# Patient Record
Sex: MALE | Race: WHITE | NOT HISPANIC OR LATINO | Employment: OTHER | URBAN - METROPOLITAN AREA
[De-identification: names, ages, dates, MRNs, and addresses within clinical notes are randomized per-mention and may not be internally consistent; named-entity substitution may affect disease eponyms.]

---

## 2017-01-02 ENCOUNTER — HOSPITAL ENCOUNTER (OUTPATIENT)
Dept: RADIOLOGY | Facility: MEDICAL CENTER | Age: 62
End: 2017-01-02

## 2017-01-02 ENCOUNTER — RESOLUTE PROFESSIONAL BILLING HOSPITAL PROF FEE (OUTPATIENT)
Dept: CARDIOLOGY | Facility: MEDICAL CENTER | Age: 62
End: 2017-01-02
Payer: COMMERCIAL

## 2017-01-02 ENCOUNTER — HOSPITAL ENCOUNTER (INPATIENT)
Facility: MEDICAL CENTER | Age: 62
LOS: 2 days | DRG: 247 | End: 2017-01-04
Attending: EMERGENCY MEDICINE | Admitting: HOSPITALIST
Payer: COMMERCIAL

## 2017-01-02 PROBLEM — I21.4 NSTEMI (NON-ST ELEVATED MYOCARDIAL INFARCTION) (HCC): Status: ACTIVE | Noted: 2017-01-02

## 2017-01-02 LAB
ALBUMIN SERPL BCP-MCNC: 3.6 G/DL (ref 3.2–4.9)
ALBUMIN/GLOB SERPL: 1.3 G/DL
ALP SERPL-CCNC: 63 U/L (ref 30–99)
ALT SERPL-CCNC: 14 U/L (ref 2–50)
ANION GAP SERPL CALC-SCNC: 10 MMOL/L (ref 0–11.9)
AST SERPL-CCNC: 23 U/L (ref 12–45)
BASOPHILS # BLD AUTO: 0.3 % (ref 0–1.8)
BASOPHILS # BLD: 0.02 K/UL (ref 0–0.12)
BILIRUB SERPL-MCNC: 0.3 MG/DL (ref 0.1–1.5)
BUN SERPL-MCNC: 20 MG/DL (ref 8–22)
CALCIUM SERPL-MCNC: 9.1 MG/DL (ref 8.5–10.5)
CHLORIDE SERPL-SCNC: 108 MMOL/L (ref 96–112)
CO2 SERPL-SCNC: 18 MMOL/L (ref 20–33)
CREAT SERPL-MCNC: 0.79 MG/DL (ref 0.5–1.4)
EKG IMPRESSION: NORMAL
EKG IMPRESSION: NORMAL
EOSINOPHIL # BLD AUTO: 0.02 K/UL (ref 0–0.51)
EOSINOPHIL NFR BLD: 0.3 % (ref 0–6.9)
ERYTHROCYTE [DISTWIDTH] IN BLOOD BY AUTOMATED COUNT: 41.5 FL (ref 35.9–50)
GFR SERPL CREATININE-BSD FRML MDRD: >60 ML/MIN/1.73 M 2
GLOBULIN SER CALC-MCNC: 2.7 G/DL (ref 1.9–3.5)
GLUCOSE SERPL-MCNC: 123 MG/DL (ref 65–99)
HCT VFR BLD AUTO: 43.1 % (ref 42–52)
HGB BLD-MCNC: 14.6 G/DL (ref 14–18)
IMM GRANULOCYTES # BLD AUTO: 0.02 K/UL (ref 0–0.11)
IMM GRANULOCYTES NFR BLD AUTO: 0.3 % (ref 0–0.9)
INR PPP: 0.98 (ref 0.87–1.13)
LYMPHOCYTES # BLD AUTO: 1.24 K/UL (ref 1–4.8)
LYMPHOCYTES NFR BLD: 17.1 % (ref 22–41)
MCH RBC QN AUTO: 30.7 PG (ref 27–33)
MCHC RBC AUTO-ENTMCNC: 33.9 G/DL (ref 33.7–35.3)
MCV RBC AUTO: 90.7 FL (ref 81.4–97.8)
MONOCYTES # BLD AUTO: 0.81 K/UL (ref 0–0.85)
MONOCYTES NFR BLD AUTO: 11.2 % (ref 0–13.4)
NEUTROPHILS # BLD AUTO: 5.13 K/UL (ref 1.82–7.42)
NEUTROPHILS NFR BLD: 70.8 % (ref 44–72)
NRBC # BLD AUTO: 0 K/UL
NRBC BLD AUTO-RTO: 0 /100 WBC
PLATELET # BLD AUTO: 225 K/UL (ref 164–446)
PMV BLD AUTO: 9.5 FL (ref 9–12.9)
POTASSIUM SERPL-SCNC: 4.1 MMOL/L (ref 3.6–5.5)
PROT SERPL-MCNC: 6.3 G/DL (ref 6–8.2)
PROTHROMBIN TIME: 13.3 SEC (ref 12–14.6)
RBC # BLD AUTO: 4.75 M/UL (ref 4.7–6.1)
SODIUM SERPL-SCNC: 136 MMOL/L (ref 135–145)
TROPONIN I SERPL-MCNC: 19.35 NG/ML (ref 0–0.04)
TROPONIN I SERPL-MCNC: 2.84 NG/ML (ref 0–0.04)
TROPONIN I SERPL-MCNC: 31.42 NG/ML (ref 0–0.04)
WBC # BLD AUTO: 7.2 K/UL (ref 4.8–10.8)

## 2017-01-02 PROCEDURE — C1725 CATH, TRANSLUMIN NON-LASER: HCPCS

## 2017-01-02 PROCEDURE — 85610 PROTHROMBIN TIME: CPT

## 2017-01-02 PROCEDURE — C9600 PERC DRUG-EL COR STENT SING: HCPCS | Mod: RC

## 2017-01-02 PROCEDURE — 303465 HCHG DOPPLER NEEDLE

## 2017-01-02 PROCEDURE — 700102 HCHG RX REV CODE 250 W/ 637 OVERRIDE(OP): Performed by: INTERNAL MEDICINE

## 2017-01-02 PROCEDURE — 93005 ELECTROCARDIOGRAM TRACING: CPT | Performed by: EMERGENCY MEDICINE

## 2017-01-02 PROCEDURE — 700102 HCHG RX REV CODE 250 W/ 637 OVERRIDE(OP)

## 2017-01-02 PROCEDURE — B2111ZZ FLUOROSCOPY OF MULTIPLE CORONARY ARTERIES USING LOW OSMOLAR CONTRAST: ICD-10-PCS | Performed by: INTERNAL MEDICINE

## 2017-01-02 PROCEDURE — 700111 HCHG RX REV CODE 636 W/ 250 OVERRIDE (IP)

## 2017-01-02 PROCEDURE — 93005 ELECTROCARDIOGRAM TRACING: CPT | Performed by: INTERNAL MEDICINE

## 2017-01-02 PROCEDURE — 700117 HCHG RX CONTRAST REV CODE 255: Performed by: INTERNAL MEDICINE

## 2017-01-02 PROCEDURE — 93458 L HRT ARTERY/VENTRICLE ANGIO: CPT

## 2017-01-02 PROCEDURE — 84484 ASSAY OF TROPONIN QUANT: CPT | Mod: 91

## 2017-01-02 PROCEDURE — 700101 HCHG RX REV CODE 250

## 2017-01-02 PROCEDURE — 80053 COMPREHEN METABOLIC PANEL: CPT

## 2017-01-02 PROCEDURE — 99153 MOD SED SAME PHYS/QHP EA: CPT

## 2017-01-02 PROCEDURE — C1757 CATH, THROMBECTOMY/EMBOLECT: HCPCS

## 2017-01-02 PROCEDURE — 770022 HCHG ROOM/CARE - ICU (200)

## 2017-01-02 PROCEDURE — C1894 INTRO/SHEATH, NON-LASER: HCPCS

## 2017-01-02 PROCEDURE — 99222 1ST HOSP IP/OBS MODERATE 55: CPT | Performed by: HOSPITALIST

## 2017-01-02 PROCEDURE — 93005 ELECTROCARDIOGRAM TRACING: CPT | Performed by: HOSPITALIST

## 2017-01-02 PROCEDURE — 99253 IP/OBS CNSLTJ NEW/EST LOW 45: CPT | Mod: 25 | Performed by: INTERNAL MEDICINE

## 2017-01-02 PROCEDURE — 304952 HCHG R 2 PADS

## 2017-01-02 PROCEDURE — 93010 ELECTROCARDIOGRAM REPORT: CPT | Mod: 76 | Performed by: INTERNAL MEDICINE

## 2017-01-02 PROCEDURE — 027034Z DILATION OF CORONARY ARTERY, ONE ARTERY WITH DRUG-ELUTING INTRALUMINAL DEVICE, PERCUTANEOUS APPROACH: ICD-10-PCS | Performed by: INTERNAL MEDICINE

## 2017-01-02 PROCEDURE — B2151ZZ FLUOROSCOPY OF LEFT HEART USING LOW OSMOLAR CONTRAST: ICD-10-PCS | Performed by: INTERNAL MEDICINE

## 2017-01-02 PROCEDURE — C1874 STENT, COATED/COV W/DEL SYS: HCPCS

## 2017-01-02 PROCEDURE — 85025 COMPLETE CBC W/AUTO DIFF WBC: CPT

## 2017-01-02 PROCEDURE — A9270 NON-COVERED ITEM OR SERVICE: HCPCS

## 2017-01-02 PROCEDURE — A9270 NON-COVERED ITEM OR SERVICE: HCPCS | Performed by: INTERNAL MEDICINE

## 2017-01-02 PROCEDURE — 700102 HCHG RX REV CODE 250 W/ 637 OVERRIDE(OP): Performed by: HOSPITALIST

## 2017-01-02 PROCEDURE — 02C03ZZ EXTIRPATION OF MATTER FROM CORONARY ARTERY, ONE ARTERY, PERCUTANEOUS APPROACH: ICD-10-PCS | Performed by: INTERNAL MEDICINE

## 2017-01-02 PROCEDURE — 360979 HCHG DIAGNOSTIC CATH

## 2017-01-02 PROCEDURE — 4A023N7 MEASUREMENT OF CARDIAC SAMPLING AND PRESSURE, LEFT HEART, PERCUTANEOUS APPROACH: ICD-10-PCS | Performed by: INTERNAL MEDICINE

## 2017-01-02 PROCEDURE — A9270 NON-COVERED ITEM OR SERVICE: HCPCS | Performed by: EMERGENCY MEDICINE

## 2017-01-02 PROCEDURE — C1769 GUIDE WIRE: HCPCS

## 2017-01-02 PROCEDURE — A9270 NON-COVERED ITEM OR SERVICE: HCPCS | Performed by: HOSPITALIST

## 2017-01-02 PROCEDURE — C1887 CATHETER, GUIDING: HCPCS

## 2017-01-02 PROCEDURE — 99152 MOD SED SAME PHYS/QHP 5/>YRS: CPT

## 2017-01-02 PROCEDURE — 700105 HCHG RX REV CODE 258: Performed by: INTERNAL MEDICINE

## 2017-01-02 PROCEDURE — 700102 HCHG RX REV CODE 250 W/ 637 OVERRIDE(OP): Performed by: EMERGENCY MEDICINE

## 2017-01-02 PROCEDURE — 99291 CRITICAL CARE FIRST HOUR: CPT

## 2017-01-02 RX ORDER — VERAPAMIL HYDROCHLORIDE 2.5 MG/ML
INJECTION, SOLUTION INTRAVENOUS
Status: COMPLETED
Start: 2017-01-02 | End: 2017-01-02

## 2017-01-02 RX ORDER — HEPARIN SODIUM,PORCINE 1000/ML
VIAL (ML) INJECTION
Status: COMPLETED
Start: 2017-01-02 | End: 2017-01-02

## 2017-01-02 RX ORDER — AMOXICILLIN 250 MG
1 CAPSULE ORAL NIGHTLY
Status: DISCONTINUED | OUTPATIENT
Start: 2017-01-02 | End: 2017-01-04 | Stop reason: HOSPADM

## 2017-01-02 RX ORDER — ONDANSETRON 4 MG/1
4 TABLET, ORALLY DISINTEGRATING ORAL EVERY 4 HOURS PRN
Status: DISCONTINUED | OUTPATIENT
Start: 2017-01-02 | End: 2017-01-04 | Stop reason: HOSPADM

## 2017-01-02 RX ORDER — BISACODYL 10 MG
10 SUPPOSITORY, RECTAL RECTAL
Status: DISCONTINUED | OUTPATIENT
Start: 2017-01-02 | End: 2017-01-04 | Stop reason: HOSPADM

## 2017-01-02 RX ORDER — LISINOPRIL 5 MG/1
5 TABLET ORAL
Status: DISCONTINUED | OUTPATIENT
Start: 2017-01-02 | End: 2017-01-04 | Stop reason: HOSPADM

## 2017-01-02 RX ORDER — ACETAMINOPHEN 325 MG/1
650 TABLET ORAL EVERY 6 HOURS PRN
Status: DISCONTINUED | OUTPATIENT
Start: 2017-01-02 | End: 2017-01-04 | Stop reason: HOSPADM

## 2017-01-02 RX ORDER — BIVALIRUDIN 250 MG/5ML
INJECTION, POWDER, LYOPHILIZED, FOR SOLUTION INTRAVENOUS
Status: COMPLETED
Start: 2017-01-02 | End: 2017-01-02

## 2017-01-02 RX ORDER — LABETALOL HYDROCHLORIDE 5 MG/ML
10 INJECTION, SOLUTION INTRAVENOUS EVERY 4 HOURS PRN
Status: DISCONTINUED | OUTPATIENT
Start: 2017-01-02 | End: 2017-01-04 | Stop reason: HOSPADM

## 2017-01-02 RX ORDER — PROMETHAZINE HYDROCHLORIDE 12.5 MG/1
12.5-25 SUPPOSITORY RECTAL EVERY 4 HOURS PRN
Status: DISCONTINUED | OUTPATIENT
Start: 2017-01-02 | End: 2017-01-04 | Stop reason: HOSPADM

## 2017-01-02 RX ORDER — DOCUSATE SODIUM 100 MG/1
100 CAPSULE, LIQUID FILLED ORAL EVERY MORNING
Status: DISCONTINUED | OUTPATIENT
Start: 2017-01-02 | End: 2017-01-04 | Stop reason: HOSPADM

## 2017-01-02 RX ORDER — PRAVASTATIN SODIUM 20 MG
40 TABLET ORAL EVERY EVENING
Status: DISCONTINUED | OUTPATIENT
Start: 2017-01-02 | End: 2017-01-02

## 2017-01-02 RX ORDER — SODIUM CHLORIDE AND POTASSIUM CHLORIDE 150; 900 MG/100ML; MG/100ML
INJECTION, SOLUTION INTRAVENOUS CONTINUOUS
Status: DISCONTINUED | OUTPATIENT
Start: 2017-01-02 | End: 2017-01-04

## 2017-01-02 RX ORDER — ASPIRIN 300 MG/1
300 SUPPOSITORY RECTAL DAILY
Status: DISCONTINUED | OUTPATIENT
Start: 2017-01-02 | End: 2017-01-02

## 2017-01-02 RX ORDER — ASPIRIN 81 MG/1
324 TABLET, CHEWABLE ORAL DAILY
Status: DISCONTINUED | OUTPATIENT
Start: 2017-01-02 | End: 2017-01-02

## 2017-01-02 RX ORDER — AMOXICILLIN 250 MG
1 CAPSULE ORAL
Status: DISCONTINUED | OUTPATIENT
Start: 2017-01-02 | End: 2017-01-04 | Stop reason: HOSPADM

## 2017-01-02 RX ORDER — ATORVASTATIN CALCIUM 40 MG/1
40 TABLET, FILM COATED ORAL
Status: DISCONTINUED | OUTPATIENT
Start: 2017-01-02 | End: 2017-01-04 | Stop reason: HOSPADM

## 2017-01-02 RX ORDER — LORAZEPAM 1 MG/1
1 TABLET ORAL ONCE
Status: COMPLETED | OUTPATIENT
Start: 2017-01-02 | End: 2017-01-02

## 2017-01-02 RX ORDER — ENEMA 19; 7 G/133ML; G/133ML
1 ENEMA RECTAL
Status: DISCONTINUED | OUTPATIENT
Start: 2017-01-02 | End: 2017-01-04 | Stop reason: HOSPADM

## 2017-01-02 RX ORDER — CARVEDILOL 3.12 MG/1
3.12 TABLET ORAL 2 TIMES DAILY WITH MEALS
Status: DISCONTINUED | OUTPATIENT
Start: 2017-01-02 | End: 2017-01-03

## 2017-01-02 RX ORDER — SODIUM CHLORIDE 9 MG/ML
INJECTION, SOLUTION INTRAVENOUS CONTINUOUS
Status: DISPENSED | OUTPATIENT
Start: 2017-01-02 | End: 2017-01-02

## 2017-01-02 RX ORDER — LIDOCAINE HYDROCHLORIDE 20 MG/ML
INJECTION, SOLUTION INFILTRATION; PERINEURAL
Status: COMPLETED
Start: 2017-01-02 | End: 2017-01-02

## 2017-01-02 RX ORDER — ONDANSETRON 2 MG/ML
4 INJECTION INTRAMUSCULAR; INTRAVENOUS EVERY 4 HOURS PRN
Status: DISCONTINUED | OUTPATIENT
Start: 2017-01-02 | End: 2017-01-04 | Stop reason: HOSPADM

## 2017-01-02 RX ORDER — MIDAZOLAM HYDROCHLORIDE 1 MG/ML
INJECTION INTRAMUSCULAR; INTRAVENOUS
Status: COMPLETED
Start: 2017-01-02 | End: 2017-01-02

## 2017-01-02 RX ORDER — LACTULOSE 20 G/30ML
30 SOLUTION ORAL
Status: DISCONTINUED | OUTPATIENT
Start: 2017-01-02 | End: 2017-01-04 | Stop reason: HOSPADM

## 2017-01-02 RX ORDER — PROMETHAZINE HYDROCHLORIDE 25 MG/1
12.5-25 TABLET ORAL EVERY 4 HOURS PRN
Status: DISCONTINUED | OUTPATIENT
Start: 2017-01-02 | End: 2017-01-04 | Stop reason: HOSPADM

## 2017-01-02 RX ORDER — ASPIRIN 325 MG
325 TABLET ORAL DAILY
Status: DISCONTINUED | OUTPATIENT
Start: 2017-01-02 | End: 2017-01-02

## 2017-01-02 RX ADMIN — IOHEXOL 118 ML: 350 INJECTION, SOLUTION INTRAVENOUS at 10:37

## 2017-01-02 RX ADMIN — TICAGRELOR 90 MG: 90 TABLET ORAL at 20:27

## 2017-01-02 RX ADMIN — TICAGRELOR 180 MG: 90 TABLET ORAL at 10:32

## 2017-01-02 RX ADMIN — LORAZEPAM 1 MG: 1 TABLET ORAL at 08:56

## 2017-01-02 RX ADMIN — HEPARIN SODIUM 2000 UNITS: 200 INJECTION, SOLUTION INTRAVENOUS at 10:11

## 2017-01-02 RX ADMIN — HEPARIN SODIUM: 1000 INJECTION, SOLUTION INTRAVENOUS; SUBCUTANEOUS at 10:11

## 2017-01-02 RX ADMIN — NITROGLYCERIN 10 ML: 5 INJECTION, SOLUTION INTRAVENOUS at 10:11

## 2017-01-02 RX ADMIN — SODIUM CHLORIDE: 9 INJECTION, SOLUTION INTRAVENOUS at 11:21

## 2017-01-02 RX ADMIN — FENTANYL CITRATE 100 MCG: 50 INJECTION, SOLUTION INTRAMUSCULAR; INTRAVENOUS at 10:12

## 2017-01-02 RX ADMIN — CARVEDILOL 3.12 MG: 3.12 TABLET, FILM COATED ORAL at 17:49

## 2017-01-02 RX ADMIN — ATORVASTATIN CALCIUM 40 MG: 40 TABLET, FILM COATED ORAL at 20:27

## 2017-01-02 RX ADMIN — MIDAZOLAM HYDROCHLORIDE 2 MG: 1 INJECTION, SOLUTION INTRAMUSCULAR; INTRAVENOUS at 10:12

## 2017-01-02 RX ADMIN — BIVALIRUDIN 250 MG: 250 INJECTION, POWDER, LYOPHILIZED, FOR SOLUTION INTRAVENOUS at 10:32

## 2017-01-02 RX ADMIN — LIDOCAINE HYDROCHLORIDE: 20 INJECTION, SOLUTION INFILTRATION; PERINEURAL at 10:10

## 2017-01-02 ASSESSMENT — PAIN SCALES - GENERAL
PAINLEVEL_OUTOF10: 0
PAINLEVEL_OUTOF10: 2

## 2017-01-02 ASSESSMENT — ENCOUNTER SYMPTOMS
TINGLING: 1
NAUSEA: 1
WHEEZING: 0
ABDOMINAL PAIN: 0
HEADACHES: 0
VOMITING: 0
COUGH: 0
SHORTNESS OF BREATH: 1
DIZZINESS: 1

## 2017-01-02 ASSESSMENT — LIFESTYLE VARIABLES
DO YOU DRINK ALCOHOL: NO
DO YOU DRINK ALCOHOL: NO

## 2017-01-02 NOTE — PROCEDURES
DATE OF SERVICE:  01/02/2017    REFERRING PHYSICIAN:  Deshawn Gutiérrez MD    PROCEDURE:  1.  Left heart catheterization.  2.  Coronary angiography.  3.  Thrombectomy/PTCA/stent placement of the proximal right coronary artery.  4.  Left ventriculogram.    PREPROCEDURE DIAGNOSIS:  Non-ST-segment myocardial infarction.    POSTPROCEDURE DIAGNOSES:  1.  Single-vessel coronary artery disease with occluded proximal to mid right   coronary artery.  2.  Successful thrombectomy/percutaneous transluminal coronary   angioplasty/stent placement of the proximal to mid right coronary artery with   2.75x20 mm Synergy drug-eluting stent.  3.  Normal left ventricular systolic function with ejection fraction of 65%.  4.  Normal left ventricular end-diastolic pressure.    INDICATION:  The patient is a 61-year-old male with past medical history   significant for dyslipidemia and previous tobacco abuse.    The patient was admitted to [facility] with chest pain and ruled in   for acute coronary syndrome/NSTEMI.  He was scheduled for cardiac   catheterization.    DESCRIPTION OF PROCEDURE:  After informed consent was signed, the patient was   brought to the cardiac catheterization laboratory.  He was prepped and draped   in usual sterile manner.  The right inguinal area was anesthetized with 2%   Xylocaine.  A 6-Bengali sheath was inserted into the right femoral artery using   modified Seldinger technique.  JL4 and 3DRC catheters were used to cannulate   the left and right coronary arteries respectively.  Coronary angiographies   were performed.  These catheters were removed and exchanged for a pigtail   catheter, which was positioned into the left ventricle.  Left angiography was   performed.  This catheter was removed.  The sheath was upgraded to 6-Bengali.    IV Angiomax was started.  A 3DRC guide catheter was positioned into right   coronary artery.  A Prowater wire was used to cross the identified occlusion.    Thrombectomy with  priority one device was performed.  A 2.5x50 mm TREK   balloon was used to predilate the identified stenosis.  A 2.75x20 mm Synergy   drug-eluting stent was successfully positioned and deployed.  This stent was   postdilated with 3.0x15 mm NC Emerge balloon.  The patient tolerated the   procedure well.  At the end of procedure, all wires, balloons, guides were   removed.  Sheath was secured into position.  He was transferred to [facility] in   stable condition.    HEMODYNAMIC DATA:  Hemodynamic data shows aortic pressures of 120/80 with mean   of 100 mmHg and /0 with LVEDP of 10 mmHg.    AORTIC VALVE:  No significant gradient noted.    LEFT VENTRICULOGRAM:  A 10 mL of contrast was delivered for 3 seconds.    Ejection fraction was estimated to be 65%.  There was no segmental wall motion   abnormalities noted.    ANGIOGRAM:  LEFT MAIN CORONARY ARTERY:  Left main coronary artery is a long large caliber   vessel free of disease.  LEFT ANTERIOR DESCENDING ARTERY:  Left anterior descending artery is a long   moderate-to-large caliber vessel, which wraps around the apex.  Proximal   portion of the vessel, there is an eccentric diffuse 20-30% stenosis.  There   are small caliber diagonal branches noted free of disease.  LEFT CIRCUMFLEX ARTERY:  Left circumflex artery is a nondominant large caliber   vessel with moderate caliber first obtuse marginal branch and large caliber   second obtuse marginal branch.  Left circumflex artery and its branches are   free of disease.  RIGHT CORONARY ARTERY:  Right coronary artery is a dominant large caliber   vessel, which is occluded at the proximal to mid portion.  Distally, there are   small-to-moderate caliber posterior descending artery and posterolateral   branches noted free of disease.    PERCUTANEOUS INTERVENTION:  1.  Proximal to mid right coronary artery occlusion with 0% residual.    Thrombectomy with priority one device.  Predilatation with 2.5x15 mm TREK   balloon.  Stent  with 2.75x20 mm Synergy drug-eluting stent.  Postdilatation   with 3.0x50 mm NC Emerge balloon.    IMPRESSION:  1.  Single-vessel coronary artery disease with occluded proximal to mid right   coronary artery.  2.  Successful thrombectomy/percutaneous transluminal coronary   angioplasty/stent placement of the proximal to mid right coronary artery with   2.75x20 mm Synergy drug-eluting stent.  3.  Normal left ventricular systolic function with ejection fraction of 65%.  4.  Normal left ventricular end-diastolic pressure.    RECOMMENDATIONS:  Recommend medical therapy with addition of Brilinta x1 year.       ____________________________________     MD JUAN JOSE SINGLETON / SHI    DD:  01/02/2017 10:50:21  DT:  01/02/2017 11:12:11    D#:  [id number]  Job#:  [id number]

## 2017-01-02 NOTE — CONSULTS
"Cardiology New Consult Note    Date of note:    1/2/2017      Consulting Physician: Elaine Lujan DO    Patient ID:    Name:   Rory Kline     YOB: 1955  Age:   61 y.o.  male   MRN:   [id number]      Consult question: Evaluation of chest pain, non-STEMI    HPI:  Rory Kline is a 61-year-old man with a history of dyslipidemia, not on any medications who presents with a week of chest pain that was constant over the last several hours. He initially presented to [facility] where he was given nitroglycerin, and became hypotensive. In that setting, he has had persistence of his chest discomfort since last night around 1:30 AM. He describes his chest pain as dull, left-sided, lasting minutes over the last week before became constant last night. He had similar discomfort about 2 years ago. He was not evaluated in the past related to concerns that he has no insurance. Apart from that, cardiovascular review of systems is negative.    ROS: Cardiovascular review systems is negative except as above. He does also have some congestion and URI type symptoms. He denies any melena nor hematochezia, nor other bleeding issues.    PMH (problem list reviewed, pertinent to this consultation):     1. Dyslipidemia    Outpatient Medications:     None prior to admission    Family History: family history includes Heart Disease (age of onset: 70) in his mother.    Social History:  reports that he quit smoking about 7 years ago. He started smoking about 23 years ago. He does not have any smokeless tobacco history on file. He reports that he drinks alcohol. He reports that he does not use illicit drugs.  He has an approximate 16-pack-year history having started and stopped a couple of times. He most recently quit at age 54.    Physical Exam    Body mass index is 25.47 kg/(m^2).  Blood pressure 132/93, pulse 61, temperature 36.8 °C (98.2 °F), resp. rate 24, height 1.854 m (6' 0.99\"), weight 87.544 kg (193 lb), SpO2 99 " "%.  Filed Vitals:    17 0722 17 0800   BP: 132/93    Pulse: 82 61   Temp: 36.8 °C (98.2 °F)    Resp: 16 24   Height: 1.854 m (6' 0.99\")    Weight: 87.544 kg (193 lb)    SpO2:  99%     Oxygen Therapy:  Pulse Oximetry: 99 %, O2 Delivery: None (Room Air)    General: Pleasant, anxious middle-aged man in no distress when interviewed though still having chest discomfort  HEENT: No jugular venous distension sitting upright, pupils equal, round and reactive to light, extraocular movements intact  Heart: Normal rate, regular rhythm, no murmurs, no rubs or gallops  Lungs: Clear to auscultation bilaterally  Abdomen: Bowel sounds positive, non tender, non distended, no masses   Extremities: No lower extremity edema, no clubbing, no cyanosis  Neurological: Alert and oriented x 3, no focal deficit  Skin: no rashes    Labs (reviewed and notable for):     CBC, 2017, outside labs showed white count 6.3, hemoglobin 15.0, platelet count 217  Chemistry panel, 2017, outside labs showed: Sodium 134, potassium 3.8, chloride 104, CO2 20, BUN 25, creatinine 0.90, glucose 131, calcium 8.8  LFTs: AST 21, ALT 20, alkaline phosphatase 69, total bilirubin 0.4, albumin 3.7, total protein 7.0, globulins 3.3  Cardiac markers: Troponin was 0.50 at 4:35 AM by again the outside labs    EKG: Shows sinus rhythm with inferior lateral ST depression improved since his presenting EKG, but not resolved    Echo: Pending at the time of this note    CXR: From admission, images reviewed shows no acute cardiopulmonary process by my read      Impression and Medical Decision Makin. NSTEMI: No high risk features with the exception of ongoing chest pain. He will need a statin, ACE inhibitor, possibly beta blocker, and dual antiplatelet therapy. I have discussed with him risks, benefits, and alternatives to cardiac catheterization, which he wishes to proceed forward with. I will await the results of those before prescribing the " aforementioned medical therapy.      This note was dictated using Dragon speech recognition software.    Thank you for allowing me to participate in the care of this patient.  Please call if any clarification will be helpful.      Deshawn Gutiérrez MD  Cardiologist, [facility]

## 2017-01-02 NOTE — IP AVS SNAPSHOT
1/4/2017          Rory Kline  [address]  [facility] [address]    Dear Rory:    [facility] wants to ensure your discharge home is safe and you or your loved ones have had all your questions answered regarding your care after you leave the hospital.    You may receive a telephone call within two days of your discharge.  This call is to make certain you understand your discharge instructions as well as ensure we provided you with the best care possible during your stay with us.     The call will only last approximately 3-5 minutes and will be done by a nurse.    Once again, we want to ensure your discharge home is safe and that you have a clear understanding of any next steps in your care.  If you have any questions or concerns, please do not hesitate to contact us, we are here for you.  Thank you for choosing [facility] for your healthcare needs.    Sincerely,    Manuel Caal    [facility]

## 2017-01-02 NOTE — ED PROVIDER NOTES
ED Provider Note    Scribed for Maricruz Fonseca D.O. by Maricruz Jorgensen. 1/2/2017, 7:46 AM.    Means of arrival: walk-in  History obtained from: patient  History limited by: none    CHIEF COMPLAINT  Chief Complaint   Patient presents with   • Chest Pressure     transfer from [facility]  Rory Kline is a 61 y.o. male who presents to the Emergency Department as a transfer from [facility] with 5/10 left chest pain onset 9PM yesterday that is exacerbated with exertion. Associated symptoms include left sided jaw pain and left arm tingling that has since resolved. Patient's Troponin was 0.5 at transferring facility. He was given Aspirin and Nitro before he came here, which he states did not resolve his pain. Patient's mother had a heart attack when she was 70, he is a smoker, and suffers from high cholesterol.    REVIEW OF SYSTEMS  Review of Systems   Respiratory: Positive for shortness of breath. Negative for cough and wheezing.    Cardiovascular: Positive for chest pain.        Positive jaw pain   Gastrointestinal: Positive for nausea. Negative for vomiting and abdominal pain.   Genitourinary: Negative for hematuria.   Neurological: Positive for dizziness and tingling. Negative for headaches.        Left arm   All other systems reviewed and are negative.      PAST MEDICAL HISTORY   high cholesterol    SURGICAL HISTORY  patient denies any surgical history    SOCIAL HISTORY  Social History   Substance Use Topics   • Smoking status: Stopped smoking 7 days ago      FAMILY HISTORY  Mother had a heart attack when she was 70, father suffered from prostate cancer    CURRENT MEDICATIONS  Home Medications     Reviewed by Jony Velasco (Pharmacy Tech) on 01/02/17 at 0822  Med List Status: Complete    Medication Last Dose Status          Patient Jermaine Taking any Medications                        ALLERGIES  No Known Allergies    PHYSICAL EXAM  VITAL SIGNS: /93 mmHg  Pulse 82  Temp(Src) 36.8 °C (98.2  "°F)  Resp 16  Ht 1.854 m (6' 0.99\")  Wt 87.544 kg (193 lb)  BMI 25.47 kg/m2    Vitals reviewed.  Constitutional: Patient is oriented to person, place, and time. Appears well-developed and well-nourished. No distress.  anxious.   Head: Normocephalic and atraumatic.   Ears: Normal external ears bilaterally.   Mouth/Throat: Oropharynx is clear and moist,  Eyes: Conjunctivae are normal. Pupils are equal, round, and reactive to light.   Neck: Normal range of motion. Neck supple.  Cardiovascular: Normal rate, regular rhythm and normal heart sounds. Normal peripheral pulses.  Pulmonary/Chest: Effort normal and breath sounds normal. No respiratory distress, no wheezes, rhonchi, or rales. No chest wall tenderness.  Abdominal: Soft. Bowel sounds are normal. There is no tenderness, rebound or guarding, or peritoneal signs  Musculoskeletal: No edema and no tenderness.   Neurological: No focal deficits.   Skin: Skin is warm and dry. No erythema. No pallor.   Psychiatric: Patient has a normal mood and affect.     LABS  Results for orders placed or performed during the hospital encounter of 01/02/17   TROPONIN   Result Value Ref Range    Troponin I 2.84 (H) 0.00 - 0.04 ng/mL   CBC WITH DIFFERENTIAL   Result Value Ref Range    WBC 7.2 4.8 - 10.8 K/uL    RBC 4.75 4.70 - 6.10 M/uL    Hemoglobin 14.6 14.0 - 18.0 g/dL    Hematocrit 43.1 42.0 - 52.0 %    MCV 90.7 81.4 - 97.8 fL    MCH 30.7 27.0 - 33.0 pg    MCHC 33.9 33.7 - 35.3 g/dL    RDW 41.5 35.9 - 50.0 fL    Platelet Count 225 164 - 446 K/uL    MPV 9.5 9.0 - 12.9 fL    Neutrophils-Polys 70.80 44.00 - 72.00 %    Lymphocytes 17.10 (L) 22.00 - 41.00 %    Monocytes 11.20 0.00 - 13.40 %    Eosinophils 0.30 0.00 - 6.90 %    Basophils 0.30 0.00 - 1.80 %    Immature Granulocytes 0.30 0.00 - 0.90 %    Nucleated RBC 0.00 /100 WBC    Neutrophils (Absolute) 5.13 1.82 - 7.42 K/uL    Lymphs (Absolute) 1.24 1.00 - 4.80 K/uL    Monos (Absolute) 0.81 0.00 - 0.85 K/uL    Eos (Absolute) 0.02 0.00 " - 0.51 K/uL    Baso (Absolute) 0.02 0.00 - 0.12 K/uL    Immature Granulocytes (abs) 0.02 0.00 - 0.11 K/uL    NRBC (Absolute) 0.00 K/uL   PROTHROMBIN TIME   Result Value Ref Range    PT 13.3 12.0 - 14.6 sec    INR 0.98 0.87 - 1.13   COMP METABOLIC PANEL   Result Value Ref Range    Sodium 136 135 - 145 mmol/L    Potassium 4.1 3.6 - 5.5 mmol/L    Chloride 108 96 - 112 mmol/L    Co2 18 (L) 20 - 33 mmol/L    Anion Gap 10.0 0.0 - 11.9    Glucose 123 (H) 65 - 99 mg/dL    Bun 20 8 - 22 mg/dL    Creatinine 0.79 0.50 - 1.40 mg/dL    Calcium 9.1 8.5 - 10.5 mg/dL    AST(SGOT) 23 12 - 45 U/L    ALT(SGPT) 14 2 - 50 U/L    Alkaline Phosphatase 63 30 - 99 U/L    Total Bilirubin 0.3 0.1 - 1.5 mg/dL    Albumin 3.6 3.2 - 4.9 g/dL    Total Protein 6.3 6.0 - 8.2 g/dL    Globulin 2.7 1.9 - 3.5 g/dL    A-G Ratio 1.3 g/dL   ESTIMATED GFR   Result Value Ref Range    GFR If African American >60 >60 mL/min/1.73 m 2    GFR If Non African American >60 >60 mL/min/1.73 m 2   EKG (ER)   Result Value Ref Range    Report       [facility] Emergency Dept[facility]    Test Date:  2017  Pt Name:    COLEEN WINN                 Department: ER  MRN:        [id number]                      Room:       RD 10  Gender:     M                            Technician: [id number]  :        1955                   Requested By:BRADY JOSEPH  Order #:    [id number]                    Mirian MD:    Measurements  Intervals                                Axis  Rate:       81                           P:          74  ID:         [id number]                          QRS:        67  QRSD:       92                           T:          34  QT:         396  QTc:        460    Interpretive Statements  SINUS RHYTHM  BORDERLINE INFERIOR Q WAVES  ARTIFACT IN LEAD(S) II,III,aVR,aVF  No previous ECG available for comparison     EKG STAT   Result Value Ref Range    Report       [facility]    Test Date:  2017  Pt Name:    COLEEN WINN                 Department:  ER  MRN:        [id number]                      Room:       T620  Gender:     M                            Technician: NUPUR  :        1955                   Requested By:RAQUEL CID  Order #:    [id number]                    Reading MD:    Measurements  Intervals                                Axis  Rate:       74                           P:          75  MA:         160                          QRS:        63  QRSD:       98                           T:          22  QT:         396  QTc:        440    Interpretive Statements  SINUS RHYTHM  PROBABLE LEFT ATRIAL ABNORMALITY  PROBABLE INFERIOR INFARCT, AGE INDETERMINATE  Compared to ECG 2017 08:09:32  Myocardial infarct finding now present         All labs reviewed by me.    EKG Interpretation  Interpreted by me    Rhythm: normal sinus   Rate: normal 81  Axis: normal  Ectopy: none  Conduction: normal  ST Segments: no acute change  T Waves: no acute change  Q Waves: inferior    Clinical Impression: no acute changes and normal EKG    EKG Interpretation from transferring facility  NO CHANGE FROM [facility] EKG  Interpreted by me    Rhythm: normal sinus   Rate: normal  Axis: normal  Ectopy: none  Conduction: normal  ST Segments: no acute change  T Waves: no acute change  Q Waves: inferior    Clinical Impression: no acute changes and normal EKG      RADIOLOGY  OUTSIDE IMAGES-DX CHEST   Preliminary Result        The radiologist's interpretation of all radiological studies have been reviewed by me.    COURSE & MEDICAL DECISION MAKING  Pertinent Labs & Imaging studies reviewed. (See chart for details)    Obtained and reviewed past medical records from 16 which indicated he was seen initially for this chest pain at [facility].    7:46 AM - Patient seen and examined at bedside. Ordered Troponin, and EKG to evaluate his symptoms. The differential diagnoses include but are not limited to: NSTEMI, unstable angina.    8:40 AM D/W Dr. Gutiérrez, cardiology re:  patient and lab results. He will plan for Cath today     8:50 AM called lab to run remainder of ordered labs, emanuel INR.     9 AM, discussed with Dr. Deshawn Gutiérrez, who requested the hospitalist admit this patient to their service and they will consult.     9:05 AM discussed with Dr. Man, hospitalist, who agrees to admit the patient to their service.     9:08 AM patient updated on plan of care.        DISPOSITION:  [facility] will be [facility] to [facility] in guarded condition.      FINAL IMPRESSION  Chest pain, non-ST elevation myocardial infarction   Elevated troponin     I, Maricruz Jorgensen (Scribe), am scribing for, and in the presence of, Maricruz Fonseca D.O..    Electronically signed by: Maricruz Jorgensen (Scribe), 1/2/2017    I, Maricruz Fonseca D.O. personally performed the services described in this documentation, as scribed by Maricruz Jorgensen in my presence, and it is both accurate and complete.    The note accurately reflects work and decisions made by me.  Maricruz Fonseca  1/2/2017  11:17 AM

## 2017-01-02 NOTE — IP AVS SNAPSHOT
" After Visit Summary                                                                                                                  Name:Rory Kline  Medical Record Number:[id number]  CSN: [id number]    YOB: 1955   Age: 61 y.o.  Sex: male  HT:1.854 m (6' 0.99\") WT: 88.1 kg (194 lb 3.6 oz)          Admit Date: 1/2/2017     Discharge Date:   Today's Date: 1/4/2017  Attending Doctor:  No att. providers found                  Allergies:  Review of patient's allergies indicates no known allergies.            Discharge Instructions       Discharge Instructions    Discharged to home by car with friend. Discharged via walking, hospital escort: Refused.  Special equipment needed: Not Applicable    Be sure to schedule a follow-up appointment with your primary care doctor or any specialists as instructed.     Discharge Plan:   Influenza Vaccine Indication: Patient Refuses    I understand that a diet low in cholesterol, fat, and sodium is recommended for good health. Unless I have been given specific instructions below for another diet, I accept this instruction as my diet prescription.   Other diet: Cardiac    Special Instructions: Diagnosis:  Acute Coronary Syndrome (ACS) is a diagnosis that encompasses cardiac-related chest pain and heart attack. ACS occurs when the blood flow to the heart muscle is severely reduced or cut off completely due to a slow process called atherosclerosis.  Atherosclerosis is a disease in which the coronary arteries become narrow from a buildup of fat, cholesterol, and other substances that combine to form plaque. If the plaque breaks, a blood clot will form and block the blood flow to the heart muscle. This lack of blood flow can cause damage or death to the heart muscle which is called a heart attack or Myocardial Infarction (MI). There are two different types of MIs:  ST Elevation Myocardial Infarction or STEMI (the most severe type of heart attack) and Non-ST Elevation Myocardial " Infarction or NSTEMI.    Treatment Plan:  · Cardiac Diet  - Low fat, low salt, low cholesterol   · Cardiac Rehab  - Your doctor has ordered you a referral to [facility].  Call [phone] to schedule an appointment.  · Attend my follow-up appointment with my Cardiologist.  · Take my medications as prescribed by my doctor  · Exercise daily  · Lower my bad cholesterol and raise my good cholesterol    Medications:  Certain medications are used to treat ACS.  Remember to always take medications as prescribed and never stop talking medications unless told by your doctor.    You have been prescribed the following medicatons:    Aspirin - Aspirin is used as a blood thinning medication and you will require this medication indefinitely.    Lipitor    Ticagrelor    · Is patient discharged on Warfarin / Coumadin?   No     · Is patient Post Blood Transfusion?  No    Depression / Suicide Risk    As you are discharged from this [facility] facility, it is important to learn how to keep safe from harming yourself.    Recognize the warning signs:  · Abrupt changes in personality, positive or negative- including increase in energy   · Giving away possessions  · Change in eating patterns- significant weight changes-  positive or negative  · Change in sleeping patterns- unable to sleep or sleeping all the time   · Unwillingness or inability to communicate  · Depression  · Unusual sadness, discouragement and loneliness  · Talk of wanting to die  · Neglect of personal appearance   · Rebelliousness- reckless behavior  · Withdrawal from people/activities they love  · Confusion- inability to concentrate     If you or a loved one observes any of these behaviors or has concerns about self-harm, here's what you can do:  · Talk about it- your feelings and reasons for harming yourself  · Remove any means that you might use to hurt yourself (examples: pills, rope, extension cords, firearm)  · Get professional help from the community (Mental  Health, Substance Abuse, psychological counseling)  · Do not be alone:Call your Safe Contact- someone whom you trust who will be there for you.  · Call your local CRISIS HOTLINE 605-0782 or 052-167-8864  · Call your local Children's Mobile Crisis Response Team [place] (983) 618-7610 or www.Strobe  · Call the toll free National Suicide Prevention Hotlines   · National Suicide Prevention Lifeline 141-658-LOVJ (9268)  · Hotreader Hope Line Network 800-SUICIDE (842-7441)        Follow-up Information     1. Schedule an appointment as soon as possible for a visit with Deshawn Gutiérrez M.D..    Specialty:  Cardiology    Contact information    [address]  [address]  [facility] [address] [phone]           Discharge Medication Instructions:    Below are the medications your physician expects you to take upon discharge:    Review all your home medications and newly ordered medications with your doctor and/or pharmacist. Follow medication instructions as directed by your doctor and/or pharmacist.    Please keep your medication list with you and share with your physician.               Medication List      START taking these medications        Instructions    aspirin 81 MG EC tablet   Last time this was given:  81 mg on 1/4/2017  9:12 AM    Take 1 Tab by mouth every day.   Dose:  81 mg       atorvastatin 40 MG Tabs   Last time this was given:  40 mg on 1/3/2017  8:22 PM   Commonly known as:  LIPITOR    Take 1 Tab by mouth every bedtime.   Dose:  40 mg       clopidogrel 75 MG Tabs   Commonly known as:  PLAVIX    Doctor's comments:  To start after the one month of Brilinta has completed.   Take 1 Tab by mouth every day.   Dose:  75 mg       ticagrelor 90 MG Tabs tablet   Last time this was given:  90 mg on 1/4/2017  9:12 AM   Commonly known as:  BRILINTA    Take 1 Tab by mouth 2 Times a Day.   Dose:  90 mg               Instructions           Diet / Nutrition:    Follow any diet instructions given to you by  your doctor or the dietician, including how much salt (sodium) you are allowed each day.    If you are overweight, talk to your doctor about a weight reduction plan.    Activity:    Remain physically active following your doctor's instructions about exercise and activity.    Rest often.     Any time you become even a little tired or short of breath, SIT DOWN and rest.    Worsening Symptoms:    Report any of the following signs and symptoms to the doctor's office immediately:    *Pain of jaw, arm, or neck  *Chest pain not relieved by medication                               *Dizziness or loss of consciousness  *Difficulty breathing even when at rest   *More tired than usual                                       *Bleeding drainage or swelling of surgical site  *Swelling of feet, ankles, legs or stomach                 *Fever (>100ºF)  *Pink or blood tinged sputum  *Weight gain (3lbs/day or 5lbs /week)           *Shock from internal defibrillator (if applicable)  *Palpitations or irregular heartbeats                *Cool and/or numb extremities    Stroke Awareness    Common Risk Factors for Stroke include:    Age  Atrial Fibrillation  Carotid Artery Stenosis  Diabetes Mellitus  Excessive alcohol consumption  High blood pressure  Overweight   Physical inactivity  Smoking    Warning signs and symptoms of a stroke include:    *Sudden numbness or weakness of the face, arm or leg (especially on one side of the body).  *Sudden confusion, trouble speaking or understanding.  *Sudden trouble seeing in one or both eyes.  *Sudden trouble walking, dizziness, loss of balance or coordination.Sudden severe headache with no known cause.    It is very important to get treatment quickly when a stroke occurs. If you experience any of the above warning signs, call 911 immediately.                   Disclaimer         Quit Smoking / Tobacco Use:    I understand the use of any tobacco products increases my chance of suffering from future  heart disease or stroke and could cause other illnesses which may shorten my life. Quitting the use of tobacco products is the single most important thing I can do to improve my health. For further information on smoking / tobacco cessation call a Toll Free Quit Line at 1-403.826.1522 (*National [employer]) or 1-553.789.5119 (American Lung Association) or you can access the web based program at www.lungusa.org.    [place] Tobacco Users Help Line:  (191) 171-5582       Toll Free: 1-401.495.8976  Quit Tobacco Program [facility] (913)094-3585    Crisis Hotline:    [place] Crisis Hotline:  1-110-QAYQHCB or 1-344.347.6509    [place] Crisis Hotline:    1-642.499.1910 or 168-862-9421    Discharge Survey:   Thank you for choosing [facility]. We hope we did everything we could to make your hospital stay a pleasant one. You may be receiving a phone survey and we would appreciate your time and participation in answering the questions. Your input is very valuable to us in our efforts to improve our service to our patients and their families.        My signature on this form indicates that:    1. I have reviewed and understand the above information.  2. My questions regarding this information have been answered to my satisfaction.  3. I have formulated a plan with my discharge nurse to obtain my prescribed medications for home.                  Disclaimer         __________________________________                     __________       ________                       Patient Signature                                                 Date                    Time

## 2017-01-02 NOTE — IP AVS SNAPSHOT
Drobo Access Code: [email or web address]  Expires: 2/3/2017 11:55 AM    Your email address is not on file at [employer].  Email Addresses are required for you to sign up for Drobo, please contact [phone] to verify your personal information and to provide your email address prior to attempting to register for Drobo.    Rory Kline  [address]  [place] [address]    Drobo  A secure, online tool to manage your health information     [employer]’s Drobo® is a secure, online tool that connects you to your personalized health information from the privacy of your home -- day or night - making it very easy for you to manage your healthcare. Once the activation process is completed, you can even access your medical information using the Drobo rona, which is available for free in the Apple Rona store or Google Play store.     To learn more about Drobo, visit [email or web address]    There are two levels of access available (as shown below):   My Chart Features  [facility] Primary Care Doctor [facility]  Specialists [facility]  Urgent  Care Non-[facility] Primary Care Doctor   Email your healthcare team securely and privately 24/7 X X X    Manage appointments: schedule your next appointment; view details of past/upcoming appointments X      Request prescription refills. X      View recent personal medical records, including lab and immunizations X X X X   View health record, including health history, allergies, medications X X X X   Read reports about your outpatient visits, procedures, consult and ER notes X X X X   See your discharge summary, which is a recap of your hospital and/or ER visit that includes your diagnosis, lab results, and care plan X X  X     How to register for [employer]:  Once your e-mail address has been verified, follow the following steps to sign up for Drobo.     1. Go to  [email or web address]  2. Click on the Sign Up Now box, which takes you to the New Member Sign Up page. You will need  to provide the following information:  a. Enter your [employer] Access Code exactly as it appears at the top of this page. (You will not need to use this code after you’ve completed the sign-up process. If you do not sign up before the expiration date, you must request a new code.)   b. Enter your date of birth.   c. Enter your home email address.   d. Click Submit, and follow the next screen’s instructions.  3. Create a [employer] ID. This will be your [employer] login ID and cannot be changed, so think of one that is secure and easy to remember.  4. Create a [employer] password. You can change your password at any time.  5. Enter your Password Reset Question and Answer. This can be used at a later time if you forget your password.   6. Enter your e-mail address. This allows you to receive e-mail notifications when new information is available in [employer].  7. Click Sign Up. You can now view your health information.    For assistance activating your [employer] account, call [phone]

## 2017-01-02 NOTE — IP AVS SNAPSHOT
" <p align=\"LEFT\"><IMG SRC=\"//EMRWB/blob$/Images/[email or web address]\" alt=\"Image\" WIDTH=\"50%\" HEIGHT=\"200\" BORDER=\"\"></p>                   Name:Rory Kline  Medical Record Number:[id number]  CSN: [id number]    YOB: 1955   Age: 61 y.o.  Sex: male  HT:1.854 m (6' 0.99\") WT: 88.1 kg (194 lb 3.6 oz)          Admit Date: 1/2/2017     Discharge Date:   Today's Date: 1/4/2017  Attending Doctor:  Sandra att. providers found                  Allergies:  Review of patient's allergies indicates no known allergies.          Follow-up Information     1. Schedule an appointment as soon as possible for a visit with Deshawn Gutiérrez M.D..    Specialty:  Cardiology    Contact information    [address]   [address]  [facility] [address]  [phone]           Medication List      Take these Medications        Instructions    aspirin 81 MG EC tablet    Take 1 Tab by mouth every day.   Dose:  81 mg       atorvastatin 40 MG Tabs   Commonly known as:  LIPITOR    Take 1 Tab by mouth every bedtime.   Dose:  40 mg       clopidogrel 75 MG Tabs   Commonly known as:  PLAVIX    Doctor's comments:  To start after the one month of Brilinta has completed.   Take 1 Tab by mouth every day.   Dose:  75 mg       ticagrelor 90 MG Tabs tablet   Commonly known as:  BRILINTA    Take 1 Tab by mouth 2 Times a Day.   Dose:  90 mg         "

## 2017-01-02 NOTE — H&P
"CHIEF COMPLAINT:  \"My chest started hurting a week ago, I guess I probably   should have come in then.\"    PRIMARY CARE PHYSICIAN:  None.    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  He thought it was \"high   heartburn\" but he describes it as a pressure-like sensation in his upper left   chest radiating up to his jaw and then to his left arm associated with some   nausea and shortness of breath.  The nausea and shortness of breath resolved   when he went to work.  The people he worked with told him it was probably a   heart attack and he should go in, but he deferred at that point and when he   had further pain last night, went to be evaluated at [facility].    He had an elevated troponin he was therefore transferred here.  At the outside   facility, his troponin was 0.5 and his repeat troponin here is 2.54.  He did   smoke in the past with approximately a 20-pack-year history.  His mother    of a heart attack at the age of 70.  His father is alive and well.  The   patient herself does not know of any major medical problems including   hypercholesterolemia or hypertension.    REVIEW OF SYSTEMS:  As above.  The remainder is negative in all systems except   as noted.    PAST MEDICAL HISTORY:  To me he said no medical problems, but the ER   physician, he mentioned of high cholesterol.    SOCIAL HISTORY:  He quit smoking 7 years ago, but previously had a   20-pack-year history.    FAMILY HISTORY:  His mother  of an MI at age 70.  Father is alive at 88   with prostate problems and dialysis.    HOME MEDICATIONS:  None.    ALLERGIES:  No known drug allergies.    PHYSICAL EXAMINATION:  VITAL SIGNS:  Temperature 36.8, heart rate 71, respiratory rate 24, blood   pressure 124/75, oxygenation 98% on room air, weight is 87-1/2 kg.  GENERAL:  This " is a older male middle-aged  gentleman who appears a   little older than his stated age.  He is resting comfortably in bed, but who   appears generally anxious.  HEENT:  Pupils equal, round and reactive to light.  There is no scleral pallor   or icterus.  Extraocular muscles are intact.  Mucous membranes are moist.  NECK:  There is no cervical or supraclavicular lymphadenopathy.  Neck is   supple without thyromegaly.  CHEST:  Lungs are clear to auscultation throughout without any crackles or   wheezes.  There is good air entry and good respiratory effort.  CARDIOVASCULAR:  He is regular rate and rhythm without any murmurs, rubs or   gallops and occasional ectopy.  No chest wall tenderness.  No lower extremity   edema.  ABDOMEN:  Belly is soft, nontender and nondistended.  Positive bowel sounds.    No appreciable hepatosplenomegaly or mass.  EXTREMITIES:  2+ pulses throughout without any cyanosis, clubbing.  Range of   motion is passively and actively intact.  Strength is 5/5 in lower extremities   bilaterally.  NEUROLOGICAL:  He is alert and oriented x4 without any focal neurological   deficits.  Sensorium is grossly intact.  Cranial nerves II-XII grossly intact.    LABORATORY DATA:  White count 7.2, hemoglobin 14.6, platelet count 225.    Sodium 136, potassium 4.1, chloride 108, bicarbonate 18, BUN is 20, creatinine   0.79, nonfasting glucose is 123.  Troponin is 2.84.    ELECTROCARDIOGRAPHY:  I personally reviewed his EKG, notes to have a normal   sinus rhythm with some artifact, ventricular rate is 81, no acute ST-T segment   changes, good progression of the R waves in the anterolateral leads.    MEDICAL DECISION MAKING:  This is a 61-year-old  gentleman who comes   in with a non-ST elevation myocardial infarction who will be admitted   necessating a high level medical management including cardiac angiography with   medication adjustments and trending of troponins.    ASSESSMENT AND PLAN:  1.   Non-ST elevation myocardial infarction.  This gentleman certainly had an   acute event, it sounds like he may have actually had 2 acute events, one about   a week ago and then another last night.  We will add on a lipid panel to   check to further risk stratify him, but he will go to the cath lab now, I have   to keep him n.p.o., therefore, I will order a beta blocker and aspirin and   statin for him, I will hold off on heparin since he is going to the cath lab   urgently.  2.  Questionable hypercholesterolemia, I will check a lipid panel, I have   already started him on a statin; however.  3.  History of tobacco abuse.  He has been smoking in 7 years.  He is   certainly tells me that he will not resume that.  4.  Prophylaxis.  He will be appropriately prophylaxed per ACCP guidelines for   venous thromboembolism.    CODE STATUS:  Full code.       ____________________________________     MD ALPHONSO Gonzalez / SHI    DD:  01/02/2017 10:31:20  DT:  01/02/2017 11:38:10    D#:  [id number]  Job#:  [id number]

## 2017-01-03 PROBLEM — E78.5 DYSLIPIDEMIA: Status: ACTIVE | Noted: 2017-01-03

## 2017-01-03 LAB
ANION GAP SERPL CALC-SCNC: 5 MMOL/L (ref 0–11.9)
BASOPHILS # BLD AUTO: 0.3 % (ref 0–1.8)
BASOPHILS # BLD: 0.02 K/UL (ref 0–0.12)
BUN SERPL-MCNC: 14 MG/DL (ref 8–22)
CALCIUM SERPL-MCNC: 8.6 MG/DL (ref 8.5–10.5)
CHLORIDE SERPL-SCNC: 109 MMOL/L (ref 96–112)
CHOLEST SERPL-MCNC: 169 MG/DL (ref 100–199)
CO2 SERPL-SCNC: 20 MMOL/L (ref 20–33)
CREAT SERPL-MCNC: 0.81 MG/DL (ref 0.5–1.4)
EOSINOPHIL # BLD AUTO: 0.12 K/UL (ref 0–0.51)
EOSINOPHIL NFR BLD: 1.8 % (ref 0–6.9)
ERYTHROCYTE [DISTWIDTH] IN BLOOD BY AUTOMATED COUNT: 42.2 FL (ref 35.9–50)
GFR SERPL CREATININE-BSD FRML MDRD: >60 ML/MIN/1.73 M 2
GLUCOSE SERPL-MCNC: 108 MG/DL (ref 65–99)
HCT VFR BLD AUTO: 40.4 % (ref 42–52)
HDLC SERPL-MCNC: 31 MG/DL
HGB BLD-MCNC: 13.9 G/DL (ref 14–18)
IMM GRANULOCYTES # BLD AUTO: 0.03 K/UL (ref 0–0.11)
IMM GRANULOCYTES NFR BLD AUTO: 0.4 % (ref 0–0.9)
LDLC SERPL CALC-MCNC: 110 MG/DL
LYMPHOCYTES # BLD AUTO: 1.8 K/UL (ref 1–4.8)
LYMPHOCYTES NFR BLD: 26.5 % (ref 22–41)
MCH RBC QN AUTO: 30.8 PG (ref 27–33)
MCHC RBC AUTO-ENTMCNC: 34.4 G/DL (ref 33.7–35.3)
MCV RBC AUTO: 89.4 FL (ref 81.4–97.8)
MONOCYTES # BLD AUTO: 0.82 K/UL (ref 0–0.85)
MONOCYTES NFR BLD AUTO: 12.1 % (ref 0–13.4)
NEUTROPHILS # BLD AUTO: 4.01 K/UL (ref 1.82–7.42)
NEUTROPHILS NFR BLD: 58.9 % (ref 44–72)
NRBC # BLD AUTO: 0 K/UL
NRBC BLD AUTO-RTO: 0 /100 WBC
PLATELET # BLD AUTO: 202 K/UL (ref 164–446)
PMV BLD AUTO: 9.5 FL (ref 9–12.9)
POTASSIUM SERPL-SCNC: 4.2 MMOL/L (ref 3.6–5.5)
RBC # BLD AUTO: 4.52 M/UL (ref 4.7–6.1)
SODIUM SERPL-SCNC: 134 MMOL/L (ref 135–145)
TRIGL SERPL-MCNC: 140 MG/DL (ref 0–149)
WBC # BLD AUTO: 6.8 K/UL (ref 4.8–10.8)

## 2017-01-03 PROCEDURE — 85025 COMPLETE CBC W/AUTO DIFF WBC: CPT

## 2017-01-03 PROCEDURE — 80061 LIPID PANEL: CPT

## 2017-01-03 PROCEDURE — A9270 NON-COVERED ITEM OR SERVICE: HCPCS | Performed by: HOSPITALIST

## 2017-01-03 PROCEDURE — A9270 NON-COVERED ITEM OR SERVICE: HCPCS | Performed by: INTERNAL MEDICINE

## 2017-01-03 PROCEDURE — 700102 HCHG RX REV CODE 250 W/ 637 OVERRIDE(OP): Performed by: INTERNAL MEDICINE

## 2017-01-03 PROCEDURE — 700102 HCHG RX REV CODE 250 W/ 637 OVERRIDE(OP): Performed by: HOSPITALIST

## 2017-01-03 PROCEDURE — 80048 BASIC METABOLIC PNL TOTAL CA: CPT

## 2017-01-03 PROCEDURE — 700112 HCHG RX REV CODE 229: Performed by: HOSPITALIST

## 2017-01-03 PROCEDURE — 99233 SBSQ HOSP IP/OBS HIGH 50: CPT | Mod: 25 | Performed by: INTERNAL MEDICINE

## 2017-01-03 PROCEDURE — 770020 HCHG ROOM/CARE - TELE (206)

## 2017-01-03 PROCEDURE — 99232 SBSQ HOSP IP/OBS MODERATE 35: CPT | Performed by: HOSPITALIST

## 2017-01-03 RX ADMIN — TICAGRELOR 90 MG: 90 TABLET ORAL at 20:22

## 2017-01-03 RX ADMIN — LISINOPRIL 5 MG: 5 TABLET ORAL at 07:49

## 2017-01-03 RX ADMIN — ATORVASTATIN CALCIUM 40 MG: 40 TABLET, FILM COATED ORAL at 20:22

## 2017-01-03 RX ADMIN — TICAGRELOR 90 MG: 90 TABLET ORAL at 07:50

## 2017-01-03 RX ADMIN — ASPIRIN 81 MG: 81 TABLET ORAL at 07:49

## 2017-01-03 RX ADMIN — DOCUSATE SODIUM 100 MG: 100 CAPSULE ORAL at 07:50

## 2017-01-03 ASSESSMENT — ENCOUNTER SYMPTOMS
SHORTNESS OF BREATH: 0
FEVER: 0
PALPITATIONS: 0
COUGH: 0
CARDIOVASCULAR NEGATIVE: 1
NAUSEA: 0
VOMITING: 0
CHILLS: 0

## 2017-01-03 ASSESSMENT — PAIN SCALES - GENERAL
PAINLEVEL_OUTOF10: 0

## 2017-01-03 NOTE — PROGRESS NOTES
Troponin of 31.42 is an expected result post angiogram, will continue to trend, however it does not warrant any further intervention as this time.

## 2017-01-03 NOTE — DISCHARGE PLANNING
Admit NSTEMI   Cat lab w/ Stent placement. No prior admits.       60 y/o single male.  [facility] resident. No  insurance listed for post acute needs.  Admit profile indicates [facility]. [phone]. Mohamud able to care for self.  Support system is friends/neighbors.     Plan:  Follow for post acute needs. Need further assess for insurance.

## 2017-01-03 NOTE — PROGRESS NOTES
Hospital Medicine Progress Note, Adult, Complex               Author: Dez Nash Date & Time created: 1/3/2017  10:42 AM     Interval History:  Pt seen and evaluated in the ICU. Mr. Kline has no previously known medical conditions that presented to the ER on 1/2 with chest pain. He had an elevated troponin consistent with NSTEMI and went to cath lab where he underwent stenting of the right coronary artery. He is feeling quite well this morning and denies chest pain and shortness of breath. Mr. Kline is ambulating well though notes pain at the right groin hematoma site with swelling.     Review of Systems:  Review of Systems   Constitutional: Negative for fever and chills.   HENT:        Nasal congestion.    Respiratory: Negative for cough and shortness of breath.    Cardiovascular: Negative for chest pain and palpitations.   Gastrointestinal: Negative for nausea and vomiting.       Physical Exam:  Physical Exam   Constitutional: He is oriented to person, place, and time. He appears well-nourished. No distress.   Neck:   No carotid bruits   Cardiovascular: Normal rate and regular rhythm.    No murmur heard.  Pulmonary/Chest: No respiratory distress. He has no wheezes.   Abdominal: Soft. He exhibits no distension.   Musculoskeletal: He exhibits no edema or tenderness.   Right groin hematoma and bruising.    Neurological: He is alert and oriented to person, place, and time.       Labs:        Invalid input(s): [id number]  Recent Labs      01/02/17   0811  01/02/17   1451  01/02/17   1930   TROPONINI  2.84*  31.42*  19.35*     Recent Labs      01/02/17   0811  01/03/17   0445   SODIUM  136  134*   POTASSIUM  4.1  4.2   CHLORIDE  108  109   CO2  18*  20   BUN  20  14   CREATININE  0.79  0.81   CALCIUM  9.1  8.6     Recent Labs      01/02/17   0811  01/03/17   0445   ALTSGPT  14   --    ASTSGOT  23   --    ALKPHOSPHAT  63   --    TBILIRUBIN  0.3   --    GLUCOSE  123*  108*     Recent Labs      01/02/17   0810   17   0445   RBC  4.75  4.52*   HEMOGLOBIN  14.6  13.9*   HEMATOCRIT  43.1  40.4*   PLATELETCT  225  202   PROTHROMBTM  13.3   --    INR  0.98   --      Recent Labs      17   0810  17   0811  17   0445   WBC  7.2   --   6.8   NEUTSPOLYS  70.80   --   58.90   LYMPHOCYTES  17.10*   --   26.50   MONOCYTES  11.20   --   12.10   EOSINOPHILS  0.30   --   1.80   BASOPHILS  0.30   --   0.30   ASTSGOT   --   23   --    ALTSGPT   --   14   --    ALKPHOSPHAT   --   63   --    TBILIRUBIN   --   0.3   --            Hemodynamics:  Temp (24hrs), Av.3 °C (97.3 °F), Min:36 °C (96.8 °F), Max:36.5 °C (97.7 °F)  Temperature: 36.5 °C (97.7 °F)  Pulse  Av.8  Min: 61  Max: 93Heart Rate (Monitored): 76  NIBP: (!) 97/74 mmHg     Respiratory:    Respiration: 14, Pulse Oximetry: 98 %           Fluids:    Intake/Output Summary (Last 24 hours) at 17 1042  Last data filed at 17 0600   Gross per 24 hour   Intake   1865 ml   Output   1650 ml   Net    215 ml     Weight: 87.5 kg (192 lb 14.4 oz)  GI/Nutrition:  Orders Placed This Encounter   Procedures   • DIET ORDER     Standing Status: Standing      Number of Occurrences: 1      Standing Expiration Date:      Order Specific Question:  Diet:     Answer:  Cardiac [6]     Medical Decision Making, by Problem:  Active Hospital Problems    Diagnosis   • NSTEMI (non-ST elevated myocardial infarction) (HCC) [I21.4]s/p stent right coronary artery. ASA, Brilinta, ACE inhibitor, holding beta blocker due to low BP. Normal EF of 65% on heart cath.    • Dyslipidemia [E78.5], lipitor 40.    Non smoker.  No arrhythmias   OK to tele.  Labs reviewed and Medications reviewed  Zhang catheter: No Zhang      DVT Prophylaxis: Not indicated at this time, ambulatory (significant right groin hematoma)

## 2017-01-03 NOTE — PROGRESS NOTES
Cardiology Progress Note               Author: Deshawn Gutiérrez Date & Time created: 1/3/2017  9:22 AM     ID: 61 year old man admitted with NSTEMI now status post PCI of his RCA status post drug-eluting stent placed 16    Interval History:  - no acute events, borderline BP, coreg held    Review of Systems   Cardiovascular: Negative.        Physical Exam   Constitutional: He is oriented to person, place, and time. He appears well-developed and well-nourished. No distress.   Moderately anxious, middle-aged man in no distress   HENT:   Head: Normocephalic and atraumatic.   Eyes: EOM are normal. Pupils are equal, round, and reactive to light.   Neck: No JVD present.   Cardiovascular: Normal rate, regular rhythm and intact distal pulses.  Exam reveals no gallop and no friction rub.    No murmur heard.  Pulmonary/Chest: Effort normal and breath sounds normal. No respiratory distress. He has no wheezes. He has no rales. He exhibits no tenderness.   Abdominal: Soft. Bowel sounds are normal. He exhibits no distension.   Musculoskeletal: He exhibits no edema.   Neurological: He is alert and oriented to person, place, and time.   Skin: Skin is warm and dry. No rash noted. He is not diaphoretic. No erythema. No pallor.   Psychiatric: He has a normal mood and affect. Judgment and thought content normal.   Nursing note and vitals reviewed.      Hemodynamics:  Temp (24hrs), Av.3 °C (97.3 °F), Min:36 °C (96.8 °F), Max:36.5 °C (97.7 °F)  Temperature: 36.5 °C (97.7 °F)  Pulse  Av.8  Min: 61  Max: 93Heart Rate (Monitored): 76  NIBP: (!) 97/74 mmHg     Respiratory:    Respiration: 14, Pulse Oximetry: 98 %           Fluids:     Weight: 87.5 kg (192 lb 14.4 oz)  GI/Nutrition:  Orders Placed This Encounter   Procedures   • DIET ORDER     Standing Status: Standing      Number of Occurrences: 1      Standing Expiration Date:      Order Specific Question:  Diet:     Answer:  Cardiac [6]     Lab Results:  Recent Labs       01/02/17   0810  01/03/17   0445   WBC  7.2  6.8   RBC  4.75  4.52*   HEMOGLOBIN  14.6  13.9*   HEMATOCRIT  43.1  40.4*   MCV  90.7  89.4   MCH  30.7  30.8   MCHC  33.9  34.4   RDW  41.5  42.2   PLATELETCT  225  202   MPV  9.5  9.5     Recent Labs      01/02/17   0811  01/03/17   0445   SODIUM  136  134*   POTASSIUM  4.1  4.2   CHLORIDE  108  109   CO2  18*  20   GLUCOSE  123*  108*   BUN  20  14   CREATININE  0.79  0.81   CALCIUM  9.1  8.6     Recent Labs      01/02/17   0810   INR  0.98         Recent Labs      01/02/17   0811  01/02/17   1451  01/02/17   1930   TROPONINI  2.84*  31.42*  19.35*     Recent Labs      01/03/17   0445   TRIGLYCERIDE  140   HDL  31*   LDL  110*         Medical Decision Making, by Problem:  Active Hospital Problems    Diagnosis   • NSTEMI (non-ST elevated myocardial infarction) ([facility]) [I21.4]       Plan:    NSTEMI: Doing well, chest pain free s/p PCI to RCA with mild, non-obstructive disease otherwise in his coronary tree   - continue atorvastatin 40 mg PO QHS   - continue DAPT with aspirin and ticagrelor (plan for 1 month of ticagrelor samples then plavix x 1 year)   - continue lisinopril 5 mg PO daily    - I stopped coreg 2/2 hypotension in the setting of RCA infarct (relatively contraindicated)   - lifestyle modification discussed    Dyslipidemia: atorvastatin as above    EKG reviewed, Medications reviewed, Labs reviewed and Radiology images reviewed (post intervention EKG showed inferior Q waves unchanged and borderline physiologic, inferior ST depression mostly unchanged, troponin peaked at 31.4)                    I spent 30 minutes with Mr. Rory Kline, over fifty percent was spent counseling the patient on their condition, best management practices, reviewing test results, risks and benefits of treatment and coordinating care.    Thank you for allowing me to participate in the care of this patient. Please call if I any clarification would be useful.    Deshawn Gutiérrez,  MD  Cardiologist, [facility] [place]

## 2017-01-03 NOTE — CARE PLAN
Problem: Pain Management  Goal: Pain level will decrease to patient’s comfort goal  Intervention: Follow pain managment plan developed in collaboration with patient and Interdisciplinary Team  Pt had no complaints of pain during the shift.       Problem: Safety  Goal: Will remain free from injury  Intervention: Provide assistance with mobility  Pt was mobilized with assistance of RN. Pt is steady and needed no assistance to mobilize.

## 2017-01-03 NOTE — DISCHARGE PLANNING
Upon utilization review, patient noted to be on the following medications that could potentially require prior authorization if prescribed at discharge: Brilinta.  If it is anticipated that patient will require these medications at discharge, beginning the prescription prior auth process in advance to anticipated discharge could assist in preventing delays when patient is medically cleared to be discharged from the hospital.     Currently this patient is listed as self pay, therefore a PA is not an option, however PFA's may locate insurance for him, if so, please be aware that the estimated cost for this medication per web search is around $350 a month, this is searching without knowing the exact dosages.

## 2017-01-04 ENCOUNTER — TELEPHONE (OUTPATIENT)
Dept: VASCULAR LAB | Facility: MEDICAL CENTER | Age: 62
End: 2017-01-04

## 2017-01-04 VITALS
OXYGEN SATURATION: 97 % | SYSTOLIC BLOOD PRESSURE: 132 MMHG | TEMPERATURE: 97.5 F | BODY MASS INDEX: 25.74 KG/M2 | HEIGHT: 73 IN | DIASTOLIC BLOOD PRESSURE: 93 MMHG | RESPIRATION RATE: 24 BRPM | HEART RATE: 70 BPM | WEIGHT: 194.22 LBS

## 2017-01-04 DIAGNOSIS — I21.4 NSTEMI (NON-ST ELEVATED MYOCARDIAL INFARCTION) (HCC): ICD-10-CM

## 2017-01-04 LAB
LV EJECT FRACT  99904: 55
LV EJECT FRACT MOD 2C 99903: 54.85
LV EJECT FRACT MOD 4C 99902: 56.15
LV EJECT FRACT MOD BP 99901: 56.76

## 2017-01-04 PROCEDURE — A9270 NON-COVERED ITEM OR SERVICE: HCPCS | Performed by: HOSPITALIST

## 2017-01-04 PROCEDURE — 700102 HCHG RX REV CODE 250 W/ 637 OVERRIDE(OP): Performed by: INTERNAL MEDICINE

## 2017-01-04 PROCEDURE — 93306 TTE W/DOPPLER COMPLETE: CPT | Mod: 26 | Performed by: INTERNAL MEDICINE

## 2017-01-04 PROCEDURE — A9270 NON-COVERED ITEM OR SERVICE: HCPCS | Performed by: INTERNAL MEDICINE

## 2017-01-04 PROCEDURE — 93306 TTE W/DOPPLER COMPLETE: CPT

## 2017-01-04 PROCEDURE — 99233 SBSQ HOSP IP/OBS HIGH 50: CPT | Mod: 25 | Performed by: INTERNAL MEDICINE

## 2017-01-04 PROCEDURE — 700112 HCHG RX REV CODE 229: Performed by: HOSPITALIST

## 2017-01-04 PROCEDURE — 99239 HOSP IP/OBS DSCHRG MGMT >30: CPT | Performed by: HOSPITALIST

## 2017-01-04 RX ORDER — ASPIRIN 81 MG/1
81 TABLET ORAL DAILY
Qty: 30 TAB | COMMUNITY
Start: 2017-01-04

## 2017-01-04 RX ORDER — ATORVASTATIN CALCIUM 40 MG/1
40 TABLET, FILM COATED ORAL
Qty: 30 TAB | Refills: 5 | Status: SHIPPED | OUTPATIENT
Start: 2017-01-04 | End: 2017-01-12 | Stop reason: SDUPTHER

## 2017-01-04 RX ORDER — CLOPIDOGREL BISULFATE 75 MG/1
75 TABLET ORAL DAILY
Qty: 30 TAB | Refills: 11 | Status: SHIPPED | OUTPATIENT
Start: 2017-01-04 | End: 2017-07-03 | Stop reason: SDUPTHER

## 2017-01-04 RX ADMIN — TICAGRELOR 90 MG: 90 TABLET ORAL at 09:12

## 2017-01-04 RX ADMIN — DOCUSATE SODIUM 100 MG: 100 CAPSULE ORAL at 09:12

## 2017-01-04 RX ADMIN — ASPIRIN 81 MG: 81 TABLET ORAL at 09:12

## 2017-01-04 ASSESSMENT — PAIN SCALES - GENERAL
PAINLEVEL_OUTOF10: 0

## 2017-01-04 ASSESSMENT — ENCOUNTER SYMPTOMS: CARDIOVASCULAR NEGATIVE: 1

## 2017-01-04 NOTE — PROGRESS NOTES
Cardiology Progress Note               Author: Deshawn Gutiérrez Date & Time created: 2017  11:45 AM     ID: 61 year old man admitted with NSTEMI now status post PCI of his RCA status post drug-eluting stent placed 16    Interval History:  - no acute events, borderline BP, coreg held    Review of Systems   Cardiovascular: Negative.        Physical Exam   Constitutional: He is oriented to person, place, and time. He appears well-developed and well-nourished. No distress.   Moderately anxious, middle-aged man in no distress   HENT:   Head: Normocephalic and atraumatic.   Eyes: EOM are normal. Pupils are equal, round, and reactive to light.   Neck: No JVD present.   Cardiovascular: Normal rate, regular rhythm and intact distal pulses.  Exam reveals no gallop and no friction rub.    No murmur heard.  Pulmonary/Chest: Effort normal and breath sounds normal. No respiratory distress. He has no wheezes. He has no rales. He exhibits no tenderness.   Abdominal: Soft. Bowel sounds are normal. He exhibits no distension.   Musculoskeletal: He exhibits no edema.   Neurological: He is alert and oriented to person, place, and time.   Skin: Skin is warm and dry. No rash noted. He is not diaphoretic. No erythema. No pallor.   Psychiatric: He has a normal mood and affect. Judgment and thought content normal.   Nursing note and vitals reviewed.      Hemodynamics:  Temp (24hrs), Av.4 °C (97.5 °F), Min:36.4 °C (97.5 °F), Max:36.4 °C (97.5 °F)  Temperature: 36.4 °C (97.5 °F)  Pulse  Av.6  Min: 61  Max: 93   NIBP: (!) 95/77 mmHg     Respiratory:    Respiration: (!) 24, Pulse Oximetry: 97 %           Fluids:     Weight: 88.1 kg (194 lb 3.6 oz)  GI/Nutrition:  Orders Placed This Encounter   Procedures   • DIET ORDER     Standing Status: Standing      Number of Occurrences: 1      Standing Expiration Date:      Order Specific Question:  Diet:     Answer:  Cardiac [6]     Lab Results:  Recent Labs      17   0810   01/03/17   0445   WBC  7.2  6.8   RBC  4.75  4.52*   HEMOGLOBIN  14.6  13.9*   HEMATOCRIT  43.1  40.4*   MCV  90.7  89.4   MCH  30.7  30.8   MCHC  33.9  34.4   RDW  41.5  42.2   PLATELETCT  225  202   MPV  9.5  9.5     Recent Labs      01/02/17   0811  01/03/17   0445   SODIUM  136  134*   POTASSIUM  4.1  4.2   CHLORIDE  108  109   CO2  18*  20   GLUCOSE  123*  108*   BUN  20  14   CREATININE  0.79  0.81   CALCIUM  9.1  8.6     Recent Labs      01/02/17   0810   INR  0.98         Recent Labs      01/02/17   0811  01/02/17   1451  01/02/17   1930   TROPONINI  2.84*  31.42*  19.35*     Recent Labs      01/03/17   0445   TRIGLYCERIDE  140   HDL  31*   LDL  110*         Medical Decision Making, by Problem:  Active Hospital Problems    Diagnosis   • NSTEMI (non-ST elevated myocardial infarction) ([facility]) [I21.4]       Plan:    NSTEMI: Doing well, chest pain free s/p PCI to RCA with mild, non-obstructive disease otherwise in his coronary tree   - continue atorvastatin 40 mg PO QHS   - continue DAPT with aspirin and ticagrelor (plan for 1 month of ticagrelor samples then plavix x 1 year)   - continue lisinopril 5 mg PO daily    - coreg contraindicated at this time 2/2 hypotension   - lifestyle modification discussed    Dyslipidemia: atorvastatin as above    Ok for discharge at this time as discussed.    EKG reviewed, Medications reviewed, Labs reviewed and Radiology images reviewed (post intervention EKG showed inferior Q waves unchanged and borderline physiologic, inferior ST depression mostly unchanged, troponin peaked at 31.4)                    I spent 30 minutes with Mr. Rory Kline, over fifty percent was spent counseling the patient on their condition, best management practices, reviewing test results, risks and benefits of treatment and coordinating care.    Thank you for allowing me to participate in the care of this patient. Please call if I any clarification would be useful.    Deshawn Gutiérrez MD  Cardiologist,  [facility] [place]

## 2017-01-04 NOTE — DIETARY
Nutrition Services: Consult cardiac rehab diet education    Pt is a 61 y.o. Male with Dx: NSTEMI    PMH: Former smoker, high cholesterol  BMI= 25.46  Labs: HDL 31,     S/p cardiac cath with successful thrombectomy/percutaneous transluminal coronary angioplasty/stent placement of the proximal to mid right coronary artery with 2.75x20 mm Synergy drug-eluting stent 1/2. Pt tolerating cardiac diet and eating well. No indication of nutritional deficiencies PTA. RD unable to see pt today for diet education, will F/u.

## 2017-01-04 NOTE — CARE PLAN
Problem: Venous Thromboembolism (VTW)/Deep Vein Thrombosis (DVT) Prevention:  Goal: Patient will participate in Venous Thrombosis (VTE)/Deep Vein Thrombosis (DVT)Prevention Measures  Outcome: PROGRESSING AS EXPECTED  In process  Intervention: Assess and monitor for anticoagulation complications  In progress  Intervention: Ensure patient wears graduated elastic stockings (YINKA hose) and/or SCDs, if ordered, when in bed or chair (Remove at least once per shift for skin check)  In progress  Intervention: Encourage patient to perform ankle flex, foot rotation, and knee flex exercises in addition to other prophylatic measures every hour while awake  In progress  Intervention: Encourage ambulation/mobilization at level directed by Physical Therapy in collaboration with Interdisciplinary Team  In progress

## 2017-01-04 NOTE — PROGRESS NOTES
12 hour chart check     Monitor Summary:  SR 70s-80s, WA 0.12, QRS 0.08, QT 0.36 with rare PVCs

## 2017-01-04 NOTE — THERAPY
Physical Therapy Contact Note    Pt discharged prior to cardiac rehab evaluation;    Barbie Yoo, PT, DPT Pager: [phone]

## 2017-01-04 NOTE — PROGRESS NOTES
Pt seen and assessed.  VS stable. Pt awaits cards for discharge orders.  [facility]  Will speak with Brian.

## 2017-01-04 NOTE — PROGRESS NOTES
Received bedside report from Fior Hendrix RN and assumed pt care at 1845. Assessment completed and plan of care discussed.

## 2017-01-04 NOTE — DISCHARGE PLANNING
Approved services form provided for DC scripts in support of preventing re-admit.  Amount . $17.84    Pending [employer] insurance.  Pateint instructed to access [facility].

## 2017-01-04 NOTE — TELEPHONE ENCOUNTER
Brought Brilinta, Plavix, and Lipitor from Health care pharmacy to the patient.  Emphasized the importance of not taking Brilinta with Plavix.  He is to begin plavix after the 30 day supply of Brilinta is exhausted.  Counseled pt on medications including aspirin use and monitoring of therapy.    Fernie Victoria, PHARMD

## 2017-01-04 NOTE — PROGRESS NOTES
Pt hypotensive 84/58 (64) at 0000 vital signs check.  Pt non-symptomatic.  Review of vital sign history showed similar blood pressures during night shift vital sign checks.  Will continue to monitor.

## 2017-01-04 NOTE — DISCHARGE INSTRUCTIONS
Discharge Instructions    Discharged to home by car with friend. Discharged via walking, hospital escort: Refused.  Special equipment needed: Not Applicable    Be sure to schedule a follow-up appointment with your primary care doctor or any specialists as instructed.     Discharge Plan:   Influenza Vaccine Indication: Patient Refuses    I understand that a diet low in cholesterol, fat, and sodium is recommended for good health. Unless I have been given specific instructions below for another diet, I accept this instruction as my diet prescription.   Other diet: Cardiac    Special Instructions: Diagnosis:  Acute Coronary Syndrome (ACS) is a diagnosis that encompasses cardiac-related chest pain and heart attack. ACS occurs when the blood flow to the heart muscle is severely reduced or cut off completely due to a slow process called atherosclerosis.  Atherosclerosis is a disease in which the coronary arteries become narrow from a buildup of fat, cholesterol, and other substances that combine to form plaque. If the plaque breaks, a blood clot will form and block the blood flow to the heart muscle. This lack of blood flow can cause damage or death to the heart muscle which is called a heart attack or Myocardial Infarction (MI). There are two different types of MIs:  ST Elevation Myocardial Infarction or STEMI (the most severe type of heart attack) and Non-ST Elevation Myocardial Infarction or NSTEMI.    Treatment Plan:  · Cardiac Diet  - Low fat, low salt, low cholesterol   · Cardiac Rehab  - Your doctor has ordered you a referral to [facility].  Call [phone] to schedule an appointment.  · Attend my follow-up appointment with my Cardiologist.  · Take my medications as prescribed by my doctor  · Exercise daily  · Lower my bad cholesterol and raise my good cholesterol    Medications:  Certain medications are used to treat ACS.  Remember to always take medications as prescribed and never stop talking medications unless  told by your doctor.    You have been prescribed the following medicatons:    Aspirin - Aspirin is used as a blood thinning medication and you will require this medication indefinitely.    Lipitor    Ticagrelor    · Is patient discharged on Warfarin / Coumadin?   No     · Is patient Post Blood Transfusion?  No    Depression / Suicide Risk    As you are discharged from this [facility] facility, it is important to learn how to keep safe from harming yourself.    Recognize the warning signs:  · Abrupt changes in personality, positive or negative- including increase in energy   · Giving away possessions  · Change in eating patterns- significant weight changes-  positive or negative  · Change in sleeping patterns- unable to sleep or sleeping all the time   · Unwillingness or inability to communicate  · Depression  · Unusual sadness, discouragement and loneliness  · Talk of wanting to die  · Neglect of personal appearance   · Rebelliousness- reckless behavior  · Withdrawal from people/activities they love  · Confusion- inability to concentrate     If you or a loved one observes any of these behaviors or has concerns about self-harm, here's what you can do:  · Talk about it- your feelings and reasons for harming yourself  · Remove any means that you might use to hurt yourself (examples: pills, rope, extension cords, firearm)  · Get professional help from the community (Mental Health, Substance Abuse, psychological counseling)  · Do not be alone:Call your Safe Contact- someone whom you trust who will be there for you.  · Call your local CRISIS HOTLINE 364-8731 or 949-753-2473  · Call your local Children's Mobile Crisis Response Team [place] (313) 606-2235 or www.[employer]  · Call the toll free National Suicide Prevention Hotlines   · National Suicide Prevention Lifeline 313-736-ETBY (5473)  · National Hope Line Network 800-SUICIDE (927-6088)

## 2017-01-04 NOTE — CARE PLAN
Problem: Communication  Goal: The ability to communicate needs accurately and effectively will improve  Plan of care discussed.  Pt's main goal for the night was to shower.  Shower supplies supplied and shower completed.      Problem: Safety  Goal: Will remain free from injury  Patient educated on fall risks and importance of using call light.  Fall precautions in place: treaded slipper socks, bed is in low position, personal belongings, wastebasket, call light, and phone are within patient's reach.

## 2017-01-04 NOTE — CARE PLAN
Problem: Pain Management  Goal: Pain level will decrease to patient’s comfort goal  Intervention: Follow pain managment plan developed in collaboration with patient and Interdisciplinary Team  Pt declines any pain at this time. Will continue to monitor for pain control      Problem: Safety  Goal: Will remain free from injury  Intervention: Provide assistance with mobility  Pt steady with ambulation, able to walk in halls without complications. No dizziness or shortness of breath

## 2017-01-05 ENCOUNTER — TELEPHONE (OUTPATIENT)
Dept: CARDIOLOGY | Facility: MEDICAL CENTER | Age: 62
End: 2017-01-05

## 2017-01-05 NOTE — DISCHARGE SUMMARY
DATE OF ADMISSION:  01/02/2017    DATE OF DISCHARGE:  01/04/2017    DISCHARGE DIAGNOSES:  1.  Non-ST elevation myocardial infarction.  2.  Status post right coronary artery stenting.  3.  Dyslipidemia.    CONSULTATIONS:  Dr. Deshawn Gutiérrez, cardiology was consulted.    PROCEDURES:  He underwent heart catheterization by Dr. Arce, on 1/2/2017,   was found to have single vessel coronary artery disease with occluded proximal   to mid right coronary artery with successful thrombectomy, angioplasty and   stent placement of the proximal to mid right coronary artery.  His ejection   fraction was found to be 65%.    LABORATORY DATA:  Creatinine is 0.81, LDL is 110, HDL 31, triglycerides 140.    His troponin went as high as 31.    HOSPITAL COURSE:  On 01/02/2017, this 61-year-old male with no primary care   provider presented with chest pain.  He underwent heart catheterization as   noted above with successful thrombectomy and stent placement.  He has been   chest pain-free since his heart catheterization.  He has had no arrhythmias   while on the monitor.  Today he has been ambulating unassisted.  His blood   pressure has been in the 90s/70s; therefore, he is not able to tolerate a beta   blocker or ACE inhibitor.  He does get a little bit dizzy if he stands up   quickly.    The patient is a nonsmoker and is to be discharged home on a statin and dual   antiplatelet therapy.    DISCHARGE MEDICATIONS:  1.  Brilinta 90 mg twice a day for 1 month, samples have been given and after   that he will be on 75 mg Plavix thereafter on a daily basis.  2.  Lipitor 40 mg daily.  3.  Aspirin 81 mg daily.    FOLLOWUP:  Follow up with Dr. Deshawn Gutiérrez.  He may be a good candidate for   beta blocker, ACE inhibitor, but as noted above, his blood pressure is too low   to tolerate either of them at this point in time.    I recommended the patient does not go to work for the next 2 weeks.  He does   work in construction.    Thirty five minutes  were spent on discharge arrangements.       ____________________________________     MD CARLO TORRES / SHI    DD:  01/04/2017 11:25:09  DT:  01/04/2017 19:04:06    D#:  [id number]  Job#:  [id number]

## 2017-01-05 NOTE — TELEPHONE ENCOUNTER
"11[address] with Mr. Valadez, he reports that he experienced a 1 second sharp chest pain this AM. PT says that otherwise he feels \"perfect\", no CP, no discomfort. PT does not want to go to ER    now. PT was urged to go to ER or call 911 if this happens again. PT agreeable to plan.   Xochitl COX RN    1100 PT called several times to both numbers. Cell is busy, no VM. And home number no answer. Called Friend Abbie and urged her to ask Pt to go to ER. Abbei will get in contact with Rory they are neighbors.       ----- Message from Jerica Pedraza sent at 1/5/2017 10:11 AM PST -----  Regarding: patient feeling discomfort from stent area  IA/Xochitl        Patient was seen by Dr. Dyer in the hospital. He said he is feeling chest discomfort from the stent area and wants to know what he should do. He can be reached at [phone]   "

## 2017-01-11 LAB — EKG IMPRESSION: NORMAL

## 2017-01-12 ENCOUNTER — TELEPHONE (OUTPATIENT)
Dept: CARDIOLOGY | Facility: MEDICAL CENTER | Age: 62
End: 2017-01-12

## 2017-01-12 ENCOUNTER — OFFICE VISIT (OUTPATIENT)
Dept: CARDIOLOGY | Facility: MEDICAL CENTER | Age: 62
End: 2017-01-12

## 2017-01-12 VITALS
HEART RATE: 78 BPM | BODY MASS INDEX: 26.14 KG/M2 | WEIGHT: 193 LBS | DIASTOLIC BLOOD PRESSURE: 72 MMHG | SYSTOLIC BLOOD PRESSURE: 110 MMHG | HEIGHT: 72 IN | OXYGEN SATURATION: 97 %

## 2017-01-12 DIAGNOSIS — I21.4 NSTEMI (NON-ST ELEVATED MYOCARDIAL INFARCTION) (HCC): ICD-10-CM

## 2017-01-12 DIAGNOSIS — Z95.5 S/P CORONARY ARTERY STENT PLACEMENT: ICD-10-CM

## 2017-01-12 DIAGNOSIS — I25.10 CORONARY ARTERY DISEASE INVOLVING NATIVE CORONARY ARTERY OF NATIVE HEART WITHOUT ANGINA PECTORIS: ICD-10-CM

## 2017-01-12 DIAGNOSIS — E78.5 DYSLIPIDEMIA: ICD-10-CM

## 2017-01-12 PROCEDURE — 99213 OFFICE O/P EST LOW 20 MIN: CPT | Performed by: NURSE PRACTITIONER

## 2017-01-12 RX ORDER — ATORVASTATIN CALCIUM 40 MG/1
40 TABLET, FILM COATED ORAL EVERY EVENING
Qty: 30 TAB | Refills: 11 | Status: SHIPPED | OUTPATIENT
Start: 2017-01-12 | End: 2017-01-26

## 2017-01-12 ASSESSMENT — ENCOUNTER SYMPTOMS
ORTHOPNEA: 0
MYALGIAS: 0
WEAKNESS: 0
DIZZINESS: 0
SHORTNESS OF BREATH: 0
CLAUDICATION: 0
ABDOMINAL PAIN: 0
COUGH: 0
PALPITATIONS: 0
PND: 0

## 2017-01-12 NOTE — PROGRESS NOTES
Subjective:   Rory Kline is a 61 y.o. male who presents today for hospital follow up.  He presented to the hospital after several days of exertional chest discomfort. He was found to be having an MI with positive troponins. On January 2, 2017 he was taken to the Cath Lab where he received a drug-eluting stent to his occluded RCA. Thrombectomy was done. His other arteries were free of significant disease.    Since been out of the hospital, he has had some sharp, brief pains to the chest that lasts only seconds. He was concerned about this therefore he called the office and was reassured. He also has complained of a lump at his right groin site. 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 He denies any numbness, tingling or weakness in his right leg.    He has not had any exertional anginal symptoms. He had some slight shortness of breath which is now improved.    He is asking whether he can return to playing tennis, hiking in the mountains and scuba diving.      History reviewed. No pertinent past medical history.  Past Surgical History   Procedure Laterality Date   • Cardiac cath  1/2/17     ROSA MARIA to RCA, LAD and Circ free of disease.     Family History   Problem Relation Age of Onset   • Heart Disease Mother 70     MI   • Other Father 84     prostate CA   • Kidney Disease Father 86     Dialysis     History   Smoking status   • Former Smoker -- 1.00 packs/day for 20 years   • Start date: 01/01/1994   • Quit date: 01/01/2010   Smokeless tobacco   • Former User   • Types: Snuff, Chew     No Known Allergies  Outpatient Encounter Prescriptions as of 1/12/2017   Medication Sig Dispense Refill   • atorvastatin (LIPITOR) 40 MG Tab Take 1 Tab by mouth every evening. 30 Tab 11   • aspirin EC 81 MG EC tablet Take 1 Tab by mouth every day. 30 Tab    • ticagrelor (BRILINTA) 90 MG Tab tablet Take 1 Tab by mouth 2 Times a Day. 60 Tab 5   • clopidogrel (PLAVIX) 75 MG Tab Take 1 Tab by mouth every day. 30  "Tab 11   • [DISCONTINUED] atorvastatin (LIPITOR) 40 MG Tab Take 1 Tab by mouth every bedtime. 30 Tab 5     No facility-administered encounter medications on file as of 1/12/2017.     Review of Systems   Constitutional: Negative for malaise/fatigue.   Respiratory: Negative for cough and shortness of breath.    Cardiovascular: Positive for chest pain (sharp, brief chest pains last only seconds.). Negative for palpitations, orthopnea, claudication, leg swelling and PND.   Gastrointestinal: Negative for abdominal pain.   Musculoskeletal: Negative for myalgias.        Lump at right groin site.   Neurological: Negative for dizziness and weakness.        Objective:   /72 mmHg  Pulse 78  Ht 1.829 m (6' 0.01\")  Wt 87.544 kg (193 lb)  BMI 26.17 kg/m2  SpO2 97%    Physical Exam   Constitutional: He is oriented to person, place, and time. He appears well-developed and well-nourished.   HENT:   Head: Normocephalic.   Eyes: Conjunctivae are normal.   Neck: No JVD present. No thyromegaly present.   Cardiovascular: Normal rate, regular rhythm and normal heart sounds.    Pulmonary/Chest: Effort normal and breath sounds normal. He has no wheezes. He has no rales.   Abdominal: Soft. Bowel sounds are normal. He exhibits no distension. There is no tenderness.   Musculoskeletal: He exhibits no edema.   Right groin site has a 1.5 cm lump. Good femoral pulse. Old bruising in his right upper thigh.   Neurological: He is alert and oriented to person, place, and time.   Skin: Skin is warm and dry.   Psychiatric: He has a normal mood and affect.     Results for COLEEN WINN (MRN [id number]) as of 1/12/2017 14:40   Ref. Range 1/3/2017 04:45   WBC Latest Ref Range: 4.8-10.8 K/uL 6.8   RBC Latest Ref Range: 4.70-6.10 M/uL 4.52 (L)   Hemoglobin Latest Ref Range: 14.0-18.0 g/dL 13.9 (L)   Hematocrit Latest Ref Range: 42.0-52.0 % 40.4 (L)   MCV Latest Ref Range: 81.4-97.8 fL 89.4   MCH Latest Ref Range: 27.0-33.0 pg 30.8   MCHC Latest Ref " Range: 33.7-35.3 g/dL 34.4   RDW Latest Ref Range: 35.9-50.0 fL 42.2   Platelet Count Latest Ref Range: 164-446 K/uL 202   MPV Latest Ref Range: 9.0-12.9 fL 9.5   Neutrophils-Polys Latest Ref Range: 44.00-72.00 % 58.90   Neutrophils (Absolute) Latest Ref Range: 1.82-7.42 K/uL 4.01   Lymphocytes Latest Ref Range: 22.00-41.00 % 26.50   Lymphs (Absolute) Latest Ref Range: 1.00-4.80 K/uL 1.80   Monocytes Latest Ref Range: 0.00-13.40 % 12.10   Monos (Absolute) Latest Ref Range: 0.00-0.85 K/uL 0.82   Eosinophils Latest Ref Range: 0.00-6.90 % 1.80   Eos (Absolute) Latest Ref Range: 0.00-0.51 K/uL 0.12   Basophils Latest Ref Range: 0.00-1.80 % 0.30   Baso (Absolute) Latest Ref Range: 0.00-0.12 K/uL 0.02   Immature Granulocytes Latest Ref Range: 0.00-0.90 % 0.40   Immature Granulocytes (abs) Latest Ref Range: 0.00-0.11 K/uL 0.03   Nucleated RBC Latest Units: /100 WBC 0.00   NRBC (Absolute) Latest Units: K/uL 0.00   Sodium Latest Ref Range: 135-145 mmol/L 134 (L)   Potassium Latest Ref Range: 3.6-5.5 mmol/L 4.2   Chloride Latest Ref Range:  mmol/L 109   Co2 Latest Ref Range: 20-33 mmol/L 20   Anion Gap Latest Ref Range: 0.0-11.9  5.0   Glucose Latest Ref Range: 65-99 mg/dL 108 (H)   Bun Latest Ref Range: 8-22 mg/dL 14   Creatinine Latest Ref Range: 0.50-1.40 mg/dL 0.81   GFR If  Latest Ref Range: >60 mL/min/1.73 m 2 >60   GFR If Non  Latest Ref Range: >60 mL/min/1.73 m 2 >60   Calcium Latest Ref Range: 8.5-10.5 mg/dL 8.6   Cholesterol,Tot Latest Ref Range: 100-199 mg/dL 169   Triglycerides Latest Ref Range: 0-149 mg/dL 140   HDL Latest Ref Range: >=40 mg/dL 31 (A)   LDL Latest Ref Range: <100 mg/dL 110 (H)       January 4, 2017: Transthoracic Echo Report  Normal left ventricular chamber size.  Left ventricular ejection fraction is visually estimated to be 55%.  Mildly dilated right ventricle.  Normal right ventricular systolic function.  Trace tricuspid regurgitation.  Normal pericardium  without effusion.      Assessment:     1. Coronary artery disease involving native coronary artery of native heart without angina pectoris     2. NSTEMI (non-ST elevated myocardial infarction) ([facility])     3. S/P coronary artery stent placement      1/2/17 ROSA MARIA to RCA.   4. Dyslipidemia  COMP METABOLIC PANEL    LIPID PROFILE    atorvastatin (LIPITOR) 40 MG Tab       Medical Decision Making:  Today's Assessment / Status / Plan:     Coronary artery disease: He has single-vessel disease and received a drug-eluting stent to his right coronary artery. He has had some sharp, brief pains which are clearly not cardiac. He is reassured. If he does develop anginal type symptoms he will seek medical care.    Non-ST elevated MI: Per his echocardiogram there is minimal heart damage as there is no regional wall motion abnormalities. He is reassured.    Coronary artery stent: He will be on aspirin ongoing. Currently he is on Brillinta to 90 mg twice a day. Samples were given. We would like him to be on Brilinta for up to year if possible. If he cannot afford this medication he will change to Plavix 75 mg.    Dyslipidemia: He is on atorvastatin. He will have lipids and metabolic panel checked in 3 months. His LDL goal is 70 since he has coronary artery disease. He will follow-up in 3 months with a doctor. He lives in [place], [place] and is uninsured at this point. He is hoping to get Medi-Hiram. He will follow-up either with Dr. Deshawn Gutiérrez in our practice or with physician in his area. He will have lab done one week prior to his follow-up appointment. He will follow-up sooner or contact us via [facility] if needed.    Collaborating Provider: Dr Ni.

## 2017-01-12 NOTE — MR AVS SNAPSHOT
"        Rory Kline   2017 1:00 PM   Office Visit   MRN: [id number]    Department:  [facility]   Dept Phone:  [phone]    Description:  Male : 1955   Provider:  DARRELL Plascencia           Reason for Visit     Hospital Follow-up First visit since recent hospital, at right groin area, bump where they put the stent in.       Allergies as of 2017     No Known Allergies      You were diagnosed with     Coronary artery disease involving native coronary artery of native heart without angina pectoris   [5035256]       NSTEMI (non-ST elevated myocardial infarction) (HCC)   [136284]       S/P coronary artery stent placement   [253038]   17 ROSA MARIA to RCA.    Dyslipidemia   [665836]         Vital Signs     Blood Pressure Pulse Height Weight Body Mass Index Oxygen Saturation    110/72 mmHg 78 1.829 m (6' 0.01\") 87.544 kg (193 lb) 26.17 kg/m2 97%    Smoking Status                   Former Smoker           Basic Information     Date Of Birth Sex Race Ethnicity Preferred Language    1955 Male White Non- English      Your appointments     2017  3:40 PM   HOSPITAL FOLLOW UP with DARRELL Silva   [facility]    [address]  [place] [address] [phone]              Problem List              ICD-10-CM Priority Class Noted - Resolved    NSTEMI (non-ST elevated myocardial infarction) (HCC) I21.4 High  2017 - Present    Dyslipidemia E78.5   1/3/2017 - Present    Coronary artery disease involving native coronary artery of native heart without angina pectoris I25.10   2017 - Present    S/P coronary artery stent placement Z95.5   2017 - Present      Health Maintenance        Date Due Completion Dates    IMM DTaP/Tdap/Td Vaccine (1 - Tdap) 1975 ---    COLONOSCOPY 2006 ---    IMM ZOSTER VACCINE 2016 ---    IMM INFLUENZA (1) 2016 ---            Current Immunizations     No " immunizations on file.      Below and/or attached are the medications your provider expects you to take. Review all of your home medications and newly ordered medications with your provider and/or pharmacist. Follow medication instructions as directed by your provider and/or pharmacist. Please keep your medication list with you and share with your provider. Update the information when medications are discontinued, doses are changed, or new medications (including over-the-counter products) are added; and carry medication information at all times in the event of emergency situations     Allergies:  No Known Allergies          Medications  Valid as of: January 12, 2017 -  2:31 PM    Generic Name Brand Name Tablet Size Instructions for use    Aspirin (Tablet Delayed Response) aspirin 81 MG Take 1 Tab by mouth every day.        Atorvastatin Calcium (Tab) LIPITOR 40 MG Take 1 Tab by mouth every evening.        Clopidogrel Bisulfate (Tab) PLAVIX 75 MG Take 1 Tab by mouth every day.        Ticagrelor (Tab) BRILINTA 90 MG Take 1 Tab by mouth 2 Times a Day.        .                 Medicines prescribed today were sent to:     [facility] [place] [address] [address] [facility] [address]    Phone: [phone] Fax: [phone]    Open 24 Hours?: No      Medication refill instructions:       If your prescription bottle indicates you have medication refills left, it is not necessary to call your provider’s office. Please contact your pharmacy and they will refill your medication.    If your prescription bottle indicates you do not have any refills left, you may request refills at any time through one of the following ways: The online [facility] system (except Urgent Care), by calling your provider’s office, or by asking your pharmacy to contact your provider’s office with a refill request. Medication refills are processed only during regular business  hours and may not be available until the next business day. Your provider may request additional information or to have a follow-up visit with you prior to refilling your medication.   *Please Note: Medication refills are assigned a new Rx number when refilled electronically. Your pharmacy may indicate that no refills were authorized even though a new prescription for the same medication is available at the pharmacy. Please request the medicine by name with the pharmacy before contacting your provider for a refill.        Your To Do List     Future Labs/Procedures Complete By Expires    COMP METABOLIC PANEL  As directed 1/12/2018    LIPID PROFILE  As directed 1/12/2018         1spire Access Code: [email or web address]  Expires: 2/3/2017 11:55 AM    Your email address is not on file at [facility].  Email Addresses are required for you to sign up for 1spire, please contact [phone] to verify your personal information and to provide your email address prior to attempting to register for 1spire.    Rory Kline  [address]  [place] [address]    1spire  A secure, online tool to manage your health information     [employer]’s 1spire® is a secure, online tool that connects you to your personalized health information from the privacy of your home -- day or night - making it very easy for you to manage your healthcare. Once the activation process is completed, you can even access your medical information using the 1spire rona, which is available for free in the Apple Rona store or Google Play store.     To learn more about 1spire, visit [email or web address]    There are two levels of access available (as shown below):   My Chart Features  [facility]  Specialists [facility]  Urgent  Care Non-[facility] Doctor   Email your healthcare team securely and privately 24/7 X X X    Manage appointments: schedule your next appointment; view details of past/upcoming appointments X       Request prescription refills. X      View recent personal medical records, including lab and immunizations X X X X   View health record, including health history, allergies, medications X X X X   Read reports about your outpatient visits, procedures, consult and ER notes X X X X   See your discharge summary, which is a recap of your hospital and/or ER visit that includes your diagnosis, lab results, and care plan X X  X     How to register for [id number]:  Once your e-mail address has been verified, follow the following steps to sign up for 5151tuan.     1. Go to  [email or web address]  2. Click on the Sign Up Now box, which takes you to the New Member Sign Up page. You will need to provide the following information:  a. Enter your 5151tuan Access Code exactly as it appears at the top of this page. (You will not need to use this code after you’ve completed the sign-up process. If you do not sign up before the expiration date, you must request a new code.)   b. Enter your date of birth.   c. Enter your home email address.   d. Click Submit, and follow the next screen’s instructions.  3. Create a [id number] ID. This will be your 5151tuan login ID and cannot be changed, so think of one that is secure and easy to remember.  4. Create a 5151tuan password. You can change your password at any time.  5. Enter your Password Reset Question and Answer. This can be used at a later time if you forget your password.   6. Enter your e-mail address. This allows you to receive e-mail notifications when new information is available in 5151tuan.  7. Click Sign Up. You can now view your health information.    For assistance activating your 5151tuan account, call [phone]

## 2017-01-12 NOTE — TELEPHONE ENCOUNTER
"Called patient. He states that since his coronary artery stent placement on 1/2/2017, he has had a bump on his right groin. He states that the bump is about 1.5-2\" across and feels hard. It is bigger than it was on 1/2/2017, but hasn't changed in size in the last four or five days. The bump does irritate him when he is walking around.    Patient is scheduled for hospital follow up 1/17/2017, but he would like to move up his appointment in case there is another snow storm. He is concerned about returning to work. He needs a tool belt for work, and worries that he will not be able to wear it.    Patient scheduled for hospital follow up today at 1 pm. Gave patient office address and phone number.    JUMA RN  "

## 2017-01-12 NOTE — TELEPHONE ENCOUNTER
----- Message from Anna Magallanes Med Ass't sent at 1/12/2017 10:15 AM PST -----  Regarding: still has a bump where the cath was done   Contact: [phone]  IA/Iain Byrnes had a procedure on 1/3 and he had a Cath. He still has a bump on his leg where they went in for the stent. He is wanting to know if this is normal? He does have an appt on 1/17 with Rosie. He can't go back to work since he wears a tool belt.     Please call him at [phone] or cell is [phone]

## 2017-01-25 ENCOUNTER — TELEPHONE (OUTPATIENT)
Dept: CARDIOLOGY | Facility: MEDICAL CENTER | Age: 62
End: 2017-01-25

## 2017-01-25 NOTE — TELEPHONE ENCOUNTER
----- Message from Mireya العلي, Med Ass't sent at 1/25/2017  3:31 PM PST -----  Regarding: FW: Clarisa calling to follow up after sending fax  Please read message below  Thank you    ----- Message -----     From: Jerica Pedraza     Sent: 1/25/2017  12:23 PM       To: Mireya العلي Med Ass't  Subject: Clarisa calling to follow up after sending #    MireyaNadiya yepez @ [facility] is calling back to follow up about fax she sent to you about changing prescription since patient doesn't have insurance. She can be reached at [phone].

## 2017-01-26 ENCOUNTER — TELEPHONE (OUTPATIENT)
Dept: CARDIOLOGY | Facility: MEDICAL CENTER | Age: 62
End: 2017-01-26

## 2017-01-26 DIAGNOSIS — E78.5 DYSLIPIDEMIA: ICD-10-CM

## 2017-01-26 RX ORDER — LOVASTATIN 40 MG/1
TABLET ORAL
Qty: 180 TAB | Refills: 3 | OUTPATIENT
Start: 2017-01-26 | End: 2022-08-04

## 2017-01-26 NOTE — TELEPHONE ENCOUNTER
----- Message from Soniya Mehta R.N. sent at 1/25/2017  3:42 PM PST -----  Regarding: FW: Raymond-Vanessa calling to follow up after sending fax      ----- Message -----     From: Mireya العلي, Med Ass't     Sent: 1/25/2017   3:31 PM       To: Soniya Mehta R.N.  Subject: FW: Rire-Vanessa calling to follow up after send#    Please read message below  Thank you    ----- Message -----     From: Jerica Pedraza     Sent: 1/25/2017  12:23 PM       To: Mireya العلي, Med Ass't  Subject: Rire-Aid calling to follow up after sending #    Mireya,         Nadiya @ [facility] is calling back to follow up about fax she sent to you about changing prescription since patient doesn't have insurance. She can be reached at [phone].

## 2017-04-11 ENCOUNTER — TELEPHONE (OUTPATIENT)
Dept: CARDIOLOGY | Facility: MEDICAL CENTER | Age: 62
End: 2017-04-11

## 2017-04-11 NOTE — TELEPHONE ENCOUNTER
Patient informed that he had his stent in January and is okay to have an extraction, but is not to stop his Plavix and aspirin.

## 2017-04-11 NOTE — TELEPHONE ENCOUNTER
----- Message from Celsa Gould sent at 4/11/2017  9:54 AM PDT -----  Regarding: Clearance for possible tooth extraction  ANNEL/Cami,    Patient is calling for clearance for possible tooth extraction. Date pending clearance. He can be reached at [phone] for call back.

## 2017-06-20 ENCOUNTER — TELEPHONE (OUTPATIENT)
Dept: CARDIOLOGY | Facility: MEDICAL CENTER | Age: 62
End: 2017-06-20

## 2017-06-20 NOTE — TELEPHONE ENCOUNTER
Dental office calling to ask if patient requires SBE prophylaxis.  Advised that this is not needed and that he is not to stop his dual antiplatelet therapy.

## 2017-06-29 ENCOUNTER — TELEPHONE (OUTPATIENT)
Dept: CARDIOLOGY | Facility: MEDICAL CENTER | Age: 62
End: 2017-06-29

## 2017-06-29 NOTE — TELEPHONE ENCOUNTER
----- Message from Celsa Gould sent at 6/29/2017  4:22 PM PDT -----  Regarding: Question about medication   Contact: [phone]  ANNEL/Cami Timmons has question about medication, could not clarify what medication he was calling about? He can be reached at [phone] for call back.

## 2017-07-03 DIAGNOSIS — I25.10 CORONARY ARTERY DISEASE INVOLVING NATIVE CORONARY ARTERY OF NATIVE HEART WITHOUT ANGINA PECTORIS: ICD-10-CM

## 2017-07-03 RX ORDER — CLOPIDOGREL BISULFATE 75 MG/1
75 TABLET ORAL DAILY
Qty: 30 TAB | Refills: 11 | Status: SHIPPED | OUTPATIENT
Start: 2017-07-03 | End: 2022-08-04

## 2017-07-10 ENCOUNTER — TELEPHONE (OUTPATIENT)
Dept: CARDIOLOGY | Facility: MEDICAL CENTER | Age: 62
End: 2017-07-10

## 2017-07-10 DIAGNOSIS — I21.4 NSTEMI (NON-ST ELEVATED MYOCARDIAL INFARCTION) (HCC): ICD-10-CM

## 2017-07-10 NOTE — TELEPHONE ENCOUNTER
FARHAT De Guzman/Rahda     Please call Monique with [facility] at [phone]. She needs to get information about the e-script for the Brilinta.      S/w Monique, states pt already received this Rx once, and they are only able to give to pt one time.  Advised Monique, pt didn't even stay for me to tell him it was sent over and claimed he was in town for a dental appt at 10:45am, lives in [place], [place], so most likely will not be coming by anyway.

## 2017-07-10 NOTE — TELEPHONE ENCOUNTER
"Pt walked into office requesting Brilinta samples.  After reviewing pt's chart and discussion with pt, he states he never applied for Medi-Hiram and still has no insurance.  Pt admits that he goes on and off \"thinners\" because he cannot afford them.  Pt reports that most recently he has been taking Plavix, however it is $17 for 7 tablets and he cannot afford Plavix, therefore is hoping to get Brilinta samples.  Advised pt it is unsafe to switch back and forth between Plavix and Brilinta and to be off of blood thinners for any period of time.  Pt seems very frazzled, states he came into town for a dental appt, and thought he could grab some samples.  Explained importance of pt being seen for evaluation on a regular basis.  Pt states he has not established with a m.dJace in [place] and has not followed up in our office either.  Pt did reluctantly agree to schedule f/u with [facility], which was scheduled for 7/26/17 at 4pm.  Advised pt will contact [facility], which is where our samples are and make sure they have the samples and will also discuss with m.d. about going from Plavix to Brilinta.  Discussed with TW, states it is unsafe to go back and forth, however Brilinta is better than nothing, therefore ok to take samples, but needs to have a plan.  Went back out to  to discuss with pt and also give him pt assistance info, however pt had already left.    LM for pt to call the office.  "

## 2017-07-12 NOTE — TELEPHONE ENCOUNTER
ANA on home phone and cell, [phone].  [place] financial assistance program information and discount card information printed from online access and mailed to patient.  Also stated he should look into [place] financial assistance programs and reminded how important anti platelet medication is for him at this time and that he should not miss any doses or switch back and forth.  Encouraged to keep FV on 7/26.

## 2018-03-22 ENCOUNTER — APPOINTMENT (OUTPATIENT)
Dept: RADIOLOGY | Facility: MEDICAL CENTER | Age: 63
DRG: 475 | End: 2018-03-22
Attending: SURGERY
Payer: COMMERCIAL

## 2018-03-22 ENCOUNTER — HOSPITAL ENCOUNTER (INPATIENT)
Facility: MEDICAL CENTER | Age: 63
LOS: 26 days | DRG: 475 | End: 2018-04-17
Attending: EMERGENCY MEDICINE | Admitting: SURGERY
Payer: COMMERCIAL

## 2018-03-22 ENCOUNTER — APPOINTMENT (OUTPATIENT)
Dept: RADIOLOGY | Facility: MEDICAL CENTER | Age: 63
DRG: 475 | End: 2018-03-22
Attending: PHYSICIAN ASSISTANT
Payer: COMMERCIAL

## 2018-03-22 ENCOUNTER — APPOINTMENT (OUTPATIENT)
Dept: RADIOLOGY | Facility: MEDICAL CENTER | Age: 63
DRG: 475 | End: 2018-03-22
Attending: ORTHOPAEDIC SURGERY
Payer: COMMERCIAL

## 2018-03-22 DIAGNOSIS — S93.315A: ICD-10-CM

## 2018-03-22 DIAGNOSIS — S91.302A: ICD-10-CM

## 2018-03-22 DIAGNOSIS — S88.112A: ICD-10-CM
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
## 2018-03-22 DIAGNOSIS — G89.18 POSTOPERATIVE PAIN: ICD-10-CM

## 2018-03-22 PROBLEM — I25.10 CORONARY ARTERY DISEASE: Status: ACTIVE | Noted: 2018-03-22

## 2018-03-22 LAB
ABO GROUP BLD: NORMAL
ABO GROUP BLD: NORMAL
ALBUMIN SERPL BCP-MCNC: 3.8 G/DL (ref 3.2–4.9)
ALBUMIN/GLOB SERPL: 1.5 G/DL
ALP SERPL-CCNC: 63 U/L (ref 30–99)
ALT SERPL-CCNC: 23 U/L (ref 2–50)
ANION GAP SERPL CALC-SCNC: 6 MMOL/L (ref 0–11.9)
APTT PPP: 25.5 SEC (ref 24.7–36)
AST SERPL-CCNC: 18 U/L (ref 12–45)
BILIRUB SERPL-MCNC: 0.5 MG/DL (ref 0.1–1.5)
BLD GP AB SCN SERPL QL: NORMAL
BUN SERPL-MCNC: 25 MG/DL (ref 8–22)
CALCIUM SERPL-MCNC: 9 MG/DL (ref 8.5–10.5)
CFT BLD TEG: 4.8 MIN (ref 5–10)
CHLORIDE SERPL-SCNC: 109 MMOL/L (ref 96–112)
CLOT ANGLE BLD TEG: 69.8 DEGREES (ref 53–72)
CLOT LYSIS 30M P MA LENFR BLD TEG: 0.4 % (ref 0–8)
CO2 SERPL-SCNC: 22 MMOL/L (ref 20–33)
CREAT SERPL-MCNC: 0.94 MG/DL (ref 0.5–1.4)
CT.EXTRINSIC BLD ROTEM: 1.5 MIN (ref 1–3)
EKG IMPRESSION: NORMAL
ERYTHROCYTE [DISTWIDTH] IN BLOOD BY AUTOMATED COUNT: 42.4 FL (ref 35.9–50)
ETHANOL BLD-MCNC: 0 G/DL
GLOBULIN SER CALC-MCNC: 2.5 G/DL (ref 1.9–3.5)
GLUCOSE BLD-MCNC: 145 MG/DL (ref 65–99)
GLUCOSE BLD-MCNC: 165 MG/DL (ref 65–99)
GLUCOSE SERPL-MCNC: 154 MG/DL (ref 65–99)
HCT VFR BLD AUTO: 45 % (ref 42–52)
HGB BLD-MCNC: 15.3 G/DL (ref 14–18)
INR PPP: 1.04 (ref 0.87–1.13)
MCF BLD TEG: 66.5 MM (ref 50–70)
MCH RBC QN AUTO: 30.4 PG (ref 27–33)
MCHC RBC AUTO-ENTMCNC: 34 G/DL (ref 33.7–35.3)
MCV RBC AUTO: 89.5 FL (ref 81.4–97.8)
PA AA BLD-ACNC: 14.7 %
PA ADP BLD-ACNC: 9.9 %
PLATELET # BLD AUTO: 282 K/UL (ref 164–446)
PMV BLD AUTO: 9 FL (ref 9–12.9)
POTASSIUM SERPL-SCNC: 4 MMOL/L (ref 3.6–5.5)
PROT SERPL-MCNC: 6.3 G/DL (ref 6–8.2)
PROTHROMBIN TIME: 13.3 SEC (ref 12–14.6)
RBC # BLD AUTO: 5.03 M/UL (ref 4.7–6.1)
RH BLD: NORMAL
RH BLD: NORMAL
SODIUM SERPL-SCNC: 137 MMOL/L (ref 135–145)
TEG ALGORITHM TGALG: ABNORMAL
WBC # BLD AUTO: 9.6 K/UL (ref 4.8–10.8)

## 2018-03-22 PROCEDURE — 82962 GLUCOSE BLOOD TEST: CPT | Mod: 91

## 2018-03-22 PROCEDURE — 80053 COMPREHEN METABOLIC PANEL: CPT

## 2018-03-22 PROCEDURE — 99291 CRITICAL CARE FIRST HOUR: CPT

## 2018-03-22 PROCEDURE — 160048 HCHG OR STATISTICAL LEVEL 1-5: Performed by: ORTHOPAEDIC SURGERY

## 2018-03-22 PROCEDURE — 0HQNXZZ REPAIR LEFT FOOT SKIN, EXTERNAL APPROACH: ICD-10-PCS | Performed by: ORTHOPAEDIC SURGERY

## 2018-03-22 PROCEDURE — 500881 HCHG PACK, EXTREMITY: Performed by: ORTHOPAEDIC SURGERY

## 2018-03-22 PROCEDURE — 70450 CT HEAD/BRAIN W/O DYE: CPT

## 2018-03-22 PROCEDURE — 500452 HCHG DRESSING, WOUND VAC MED.: Performed by: ORTHOPAEDIC SURGERY

## 2018-03-22 PROCEDURE — 700101 HCHG RX REV CODE 250

## 2018-03-22 PROCEDURE — 85347 COAGULATION TIME ACTIVATED: CPT

## 2018-03-22 PROCEDURE — 90715 TDAP VACCINE 7 YRS/> IM: CPT | Performed by: SURGERY

## 2018-03-22 PROCEDURE — 93005 ELECTROCARDIOGRAM TRACING: CPT | Performed by: SURGERY

## 2018-03-22 PROCEDURE — 85027 COMPLETE CBC AUTOMATED: CPT

## 2018-03-22 PROCEDURE — 2W3MX1Z IMMOBILIZATION OF LEFT LOWER EXTREMITY USING SPLINT: ICD-10-PCS | Performed by: SURGERY

## 2018-03-22 PROCEDURE — 160028 HCHG SURGERY MINUTES - 1ST 30 MINS LEVEL 3: Performed by: ORTHOPAEDIC SURGERY

## 2018-03-22 PROCEDURE — 86850 RBC ANTIBODY SCREEN: CPT

## 2018-03-22 PROCEDURE — 29515 APPLICATION SHORT LEG SPLINT: CPT

## 2018-03-22 PROCEDURE — 700105 HCHG RX REV CODE 258: Performed by: SURGERY

## 2018-03-22 PROCEDURE — 160009 HCHG ANES TIME/MIN: Performed by: ORTHOPAEDIC SURGERY

## 2018-03-22 PROCEDURE — 99152 MOD SED SAME PHYS/QHP 5/>YRS: CPT

## 2018-03-22 PROCEDURE — 0HBNXZZ EXCISION OF LEFT FOOT SKIN, EXTERNAL APPROACH: ICD-10-PCS | Performed by: ORTHOPAEDIC SURGERY

## 2018-03-22 PROCEDURE — 73700 CT LOWER EXTREMITY W/O DYE: CPT | Mod: LT

## 2018-03-22 PROCEDURE — 700111 HCHG RX REV CODE 636 W/ 250 OVERRIDE (IP): Performed by: SURGERY

## 2018-03-22 PROCEDURE — 501488 HCHG SUCTION CANN, WOUNDVAC TRAC: Performed by: ORTHOPAEDIC SURGERY

## 2018-03-22 PROCEDURE — 96365 THER/PROPH/DIAG IV INF INIT: CPT

## 2018-03-22 PROCEDURE — 85610 PROTHROMBIN TIME: CPT

## 2018-03-22 PROCEDURE — 160036 HCHG PACU - EA ADDL 30 MINS PHASE I: Performed by: ORTHOPAEDIC SURGERY

## 2018-03-22 PROCEDURE — 700111 HCHG RX REV CODE 636 W/ 250 OVERRIDE (IP): Performed by: ORTHOPAEDIC SURGERY

## 2018-03-22 PROCEDURE — 3E0234Z INTRODUCTION OF SERUM, TOXOID AND VACCINE INTO MUSCLE, PERCUTANEOUS APPROACH: ICD-10-PCS | Performed by: SURGERY

## 2018-03-22 PROCEDURE — 0QSP04Z REPOSITION LEFT METATARSAL WITH INTERNAL FIXATION DEVICE, OPEN APPROACH: ICD-10-PCS | Performed by: ORTHOPAEDIC SURGERY

## 2018-03-22 PROCEDURE — 86900 BLOOD TYPING SEROLOGIC ABO: CPT

## 2018-03-22 PROCEDURE — 90471 IMMUNIZATION ADMIN: CPT

## 2018-03-22 PROCEDURE — 71045 X-RAY EXAM CHEST 1 VIEW: CPT

## 2018-03-22 PROCEDURE — 770006 HCHG ROOM/CARE - MED/SURG/GYN SEMI*

## 2018-03-22 PROCEDURE — A6222 GAUZE <=16 IN NO W/SAL W/O B: HCPCS | Performed by: ORTHOPAEDIC SURGERY

## 2018-03-22 PROCEDURE — 700102 HCHG RX REV CODE 250 W/ 637 OVERRIDE(OP): Performed by: SURGERY

## 2018-03-22 PROCEDURE — 501445 HCHG STAPLER, SKIN DISP: Performed by: ORTHOPAEDIC SURGERY

## 2018-03-22 PROCEDURE — 501838 HCHG SUTURE GENERAL: Performed by: ORTHOPAEDIC SURGERY

## 2018-03-22 PROCEDURE — 0JQP0ZZ REPAIR LEFT LOWER LEG SUBCUTANEOUS TISSUE AND FASCIA, OPEN APPROACH: ICD-10-PCS | Performed by: ORTHOPAEDIC SURGERY

## 2018-03-22 PROCEDURE — 96367 TX/PROPH/DG ADDL SEQ IV INF: CPT

## 2018-03-22 PROCEDURE — 700111 HCHG RX REV CODE 636 W/ 250 OVERRIDE (IP)

## 2018-03-22 PROCEDURE — 160002 HCHG RECOVERY MINUTES (STAT): Performed by: ORTHOPAEDIC SURGERY

## 2018-03-22 PROCEDURE — 700102 HCHG RX REV CODE 250 W/ 637 OVERRIDE(OP)

## 2018-03-22 PROCEDURE — 73620 X-RAY EXAM OF FOOT: CPT | Mod: LT

## 2018-03-22 PROCEDURE — 160035 HCHG PACU - 1ST 60 MINS PHASE I: Performed by: ORTHOPAEDIC SURGERY

## 2018-03-22 PROCEDURE — 160039 HCHG SURGERY MINUTES - EA ADDL 1 MIN LEVEL 3: Performed by: ORTHOPAEDIC SURGERY

## 2018-03-22 PROCEDURE — 85576 BLOOD PLATELET AGGREGATION: CPT

## 2018-03-22 PROCEDURE — G0390 TRAUMA RESPONS W/HOSP CRITI: HCPCS

## 2018-03-22 PROCEDURE — A9270 NON-COVERED ITEM OR SERVICE: HCPCS | Performed by: SURGERY

## 2018-03-22 PROCEDURE — 0KBT0ZZ EXCISION OF LEFT LOWER LEG MUSCLE, OPEN APPROACH: ICD-10-PCS | Performed by: ORTHOPAEDIC SURGERY

## 2018-03-22 PROCEDURE — 86901 BLOOD TYPING SEROLOGIC RH(D): CPT

## 2018-03-22 PROCEDURE — 96375 TX/PRO/DX INJ NEW DRUG ADDON: CPT

## 2018-03-22 PROCEDURE — C1713 ANCHOR/SCREW BN/BN,TIS/BN: HCPCS | Performed by: ORTHOPAEDIC SURGERY

## 2018-03-22 PROCEDURE — 501330 HCHG SET, CYSTO IRRIG TUBING: Performed by: ORTHOPAEDIC SURGERY

## 2018-03-22 PROCEDURE — 85384 FIBRINOGEN ACTIVITY: CPT

## 2018-03-22 PROCEDURE — 700111 HCHG RX REV CODE 636 W/ 250 OVERRIDE (IP): Performed by: EMERGENCY MEDICINE

## 2018-03-22 PROCEDURE — 73600 X-RAY EXAM OF ANKLE: CPT | Mod: LT

## 2018-03-22 PROCEDURE — 36415 COLL VENOUS BLD VENIPUNCTURE: CPT

## 2018-03-22 PROCEDURE — A9270 NON-COVERED ITEM OR SERVICE: HCPCS

## 2018-03-22 PROCEDURE — 302874 HCHG BANDAGE ACE 2 OR 3""

## 2018-03-22 PROCEDURE — 80307 DRUG TEST PRSMV CHEM ANLYZR: CPT

## 2018-03-22 PROCEDURE — 85730 THROMBOPLASTIN TIME PARTIAL: CPT

## 2018-03-22 DEVICE — WIRE K- SMOOTH .062 - (3TX6=18): Type: IMPLANTABLE DEVICE | Status: FUNCTIONAL

## 2018-03-22 RX ORDER — MIDAZOLAM HYDROCHLORIDE 1 MG/ML
INJECTION INTRAMUSCULAR; INTRAVENOUS
Status: COMPLETED
Start: 2018-03-22 | End: 2018-03-22

## 2018-03-22 RX ORDER — ACETAMINOPHEN 650 MG/1
650 SUPPOSITORY RECTAL EVERY 4 HOURS PRN
Status: DISCONTINUED | OUTPATIENT
Start: 2018-03-22 | End: 2018-04-17 | Stop reason: HOSPADM

## 2018-03-22 RX ORDER — DEXTROSE MONOHYDRATE 25 G/50ML
25 INJECTION, SOLUTION INTRAVENOUS
Status: DISCONTINUED | OUTPATIENT
Start: 2018-03-22 | End: 2018-03-24

## 2018-03-22 RX ORDER — CLOPIDOGREL BISULFATE 75 MG/1
75 TABLET ORAL DAILY
Status: ON HOLD | COMMUNITY
End: 2018-04-17

## 2018-03-22 RX ORDER — ENEMA 19; 7 G/133ML; G/133ML
1 ENEMA RECTAL
Status: DISCONTINUED | OUTPATIENT
Start: 2018-03-22 | End: 2018-04-17 | Stop reason: HOSPADM

## 2018-03-22 RX ORDER — CEFAZOLIN SODIUM 2 G/100ML
2 INJECTION, SOLUTION INTRAVENOUS EVERY 8 HOURS
Status: DISCONTINUED | OUTPATIENT
Start: 2018-03-22 | End: 2018-03-28

## 2018-03-22 RX ORDER — OXYCODONE HCL 5 MG/5 ML
SOLUTION, ORAL ORAL
Status: COMPLETED
Start: 2018-03-22 | End: 2018-03-22

## 2018-03-22 RX ORDER — MAGNESIUM HYDROXIDE 1200 MG/15ML
LIQUID ORAL
Status: DISCONTINUED | OUTPATIENT
Start: 2018-03-22 | End: 2018-03-22 | Stop reason: HOSPADM

## 2018-03-22 RX ORDER — MORPHINE SULFATE 4 MG/ML
2 INJECTION, SOLUTION INTRAMUSCULAR; INTRAVENOUS
Status: DISCONTINUED | OUTPATIENT
Start: 2018-03-22 | End: 2018-04-05

## 2018-03-22 RX ORDER — POLYETHYLENE GLYCOL 3350 17 G/17G
1 POWDER, FOR SOLUTION ORAL 2 TIMES DAILY
Status: DISCONTINUED | OUTPATIENT
Start: 2018-03-22 | End: 2018-04-17 | Stop reason: HOSPADM

## 2018-03-22 RX ORDER — SODIUM CHLORIDE, SODIUM LACTATE, POTASSIUM CHLORIDE, CALCIUM CHLORIDE 600; 310; 30; 20 MG/100ML; MG/100ML; MG/100ML; MG/100ML
INJECTION, SOLUTION INTRAVENOUS CONTINUOUS
Status: DISCONTINUED | OUTPATIENT
Start: 2018-03-22 | End: 2018-03-25

## 2018-03-22 RX ORDER — AMOXICILLIN 250 MG
1 CAPSULE ORAL
Status: DISCONTINUED | OUTPATIENT
Start: 2018-03-22 | End: 2018-04-17 | Stop reason: HOSPADM

## 2018-03-22 RX ORDER — OXYCODONE HYDROCHLORIDE 10 MG/1
10 TABLET ORAL
Status: DISCONTINUED | OUTPATIENT
Start: 2018-03-22 | End: 2018-03-23

## 2018-03-22 RX ORDER — CHLORHEXIDINE GLUCONATE ORAL RINSE 1.2 MG/ML
15 SOLUTION DENTAL EVERY 12 HOURS
Status: CANCELLED | OUTPATIENT
Start: 2018-03-22

## 2018-03-22 RX ORDER — ACETAMINOPHEN 325 MG/1
650 TABLET ORAL EVERY 4 HOURS PRN
Status: DISCONTINUED | OUTPATIENT
Start: 2018-03-22 | End: 2018-04-17 | Stop reason: HOSPADM

## 2018-03-22 RX ORDER — ACETAMINOPHEN 325 MG/1
650 TABLET ORAL EVERY 6 HOURS
Status: DISCONTINUED | OUTPATIENT
Start: 2018-03-22 | End: 2018-03-27

## 2018-03-22 RX ORDER — OXYCODONE HYDROCHLORIDE 5 MG/1
5 TABLET ORAL
Status: DISCONTINUED | OUTPATIENT
Start: 2018-03-22 | End: 2018-03-23

## 2018-03-22 RX ORDER — BISACODYL 10 MG
10 SUPPOSITORY, RECTAL RECTAL
Status: DISCONTINUED | OUTPATIENT
Start: 2018-03-22 | End: 2018-04-17 | Stop reason: HOSPADM

## 2018-03-22 RX ORDER — SODIUM CHLORIDE, SODIUM LACTATE, POTASSIUM CHLORIDE, CALCIUM CHLORIDE 600; 310; 30; 20 MG/100ML; MG/100ML; MG/100ML; MG/100ML
INJECTION, SOLUTION INTRAVENOUS CONTINUOUS
Status: DISCONTINUED | OUTPATIENT
Start: 2018-03-22 | End: 2018-03-22

## 2018-03-22 RX ORDER — AMOXICILLIN 250 MG
1 CAPSULE ORAL NIGHTLY
Status: DISCONTINUED | OUTPATIENT
Start: 2018-03-22 | End: 2018-04-17 | Stop reason: HOSPADM

## 2018-03-22 RX ORDER — ONDANSETRON 2 MG/ML
4 INJECTION INTRAMUSCULAR; INTRAVENOUS EVERY 4 HOURS PRN
Status: DISCONTINUED | OUTPATIENT
Start: 2018-03-22 | End: 2018-04-17 | Stop reason: HOSPADM

## 2018-03-22 RX ORDER — DOCUSATE SODIUM 100 MG/1
100 CAPSULE, LIQUID FILLED ORAL 2 TIMES DAILY
Status: DISCONTINUED | OUTPATIENT
Start: 2018-03-22 | End: 2018-04-17 | Stop reason: HOSPADM

## 2018-03-22 RX ADMIN — OXYCODONE HYDROCHLORIDE 5 MG: 5 TABLET ORAL at 18:28

## 2018-03-22 RX ADMIN — FENTANYL CITRATE 50 MCG: 50 INJECTION, SOLUTION INTRAMUSCULAR; INTRAVENOUS at 16:20

## 2018-03-22 RX ADMIN — HYDROMORPHONE HYDROCHLORIDE 2 MG: 10 INJECTION, SOLUTION INTRAMUSCULAR; INTRAVENOUS; SUBCUTANEOUS at 08:44

## 2018-03-22 RX ADMIN — CEFAZOLIN SODIUM 2 G: 2 INJECTION, SOLUTION INTRAVENOUS at 21:25

## 2018-03-22 RX ADMIN — FENTANYL CITRATE 50 MCG: 50 INJECTION, SOLUTION INTRAMUSCULAR; INTRAVENOUS at 16:00

## 2018-03-22 RX ADMIN — SODIUM CHLORIDE, POTASSIUM CHLORIDE, SODIUM LACTATE AND CALCIUM CHLORIDE: 600; 310; 30; 20 INJECTION, SOLUTION INTRAVENOUS at 17:31

## 2018-03-22 RX ADMIN — MIDAZOLAM 1 MG: 1 INJECTION INTRAMUSCULAR; INTRAVENOUS at 16:00

## 2018-03-22 RX ADMIN — OXYCODONE HYDROCHLORIDE 10 MG: 10 TABLET ORAL at 21:25

## 2018-03-22 RX ADMIN — CLOSTRIDIUM TETANI TOXOID ANTIGEN (FORMALDEHYDE INACTIVATED), CORYNEBACTERIUM DIPHTHERIAE TOXOID ANTIGEN (FORMALDEHYDE INACTIVATED), BORDETELLA PERTUSSIS TOXOID ANTIGEN (GLUTARALDEHYDE INACTIVATED), BORDETELLA PERTUSSIS FILAMENTOUS HEMAGGLUTININ ANTIGEN (FORMALDEHYDE INACTIVATED), BORDETELLA PERTUSSIS PERTACTIN ANTIGEN, AND BORDETELLA PERTUSSIS FIMBRIAE 2/3 ANTIGEN 0.5 ML: 5; 2; 2.5; 5; 3; 5 INJECTION, SUSPENSION INTRAMUSCULAR at 08:11

## 2018-03-22 RX ADMIN — OXYCODONE HYDROCHLORIDE 10 MG: 5 SOLUTION ORAL at 15:45

## 2018-03-22 RX ADMIN — FENTANYL CITRATE 50 MCG: 50 INJECTION, SOLUTION INTRAMUSCULAR; INTRAVENOUS at 16:10

## 2018-03-22 RX ADMIN — ACETAMINOPHEN 650 MG: 325 TABLET, FILM COATED ORAL at 17:32

## 2018-03-22 RX ADMIN — CEFAZOLIN SODIUM 2 G: 1 INJECTION, SOLUTION INTRAVENOUS at 07:58

## 2018-03-22 RX ADMIN — MORPHINE SULFATE 2 MG: 4 INJECTION INTRAVENOUS at 23:42

## 2018-03-22 RX ADMIN — GENTAMICIN SULFATE 440 MG: 40 INJECTION, SOLUTION INTRAMUSCULAR; INTRAVENOUS at 08:30

## 2018-03-22 RX ADMIN — CEFAZOLIN SODIUM 2 G: 2 INJECTION, SOLUTION INTRAVENOUS at 17:37

## 2018-03-22 RX ADMIN — PROPOFOL 50 MG: 10 INJECTION, EMULSION INTRAVENOUS at 08:02

## 2018-03-22 RX ADMIN — FENTANYL CITRATE 50 MCG: 50 INJECTION, SOLUTION INTRAMUSCULAR; INTRAVENOUS at 16:30

## 2018-03-22 RX ADMIN — FENTANYL CITRATE 50 MCG: 50 INJECTION, SOLUTION INTRAMUSCULAR; INTRAVENOUS at 15:50

## 2018-03-22 RX ADMIN — FENTANYL CITRATE 50 MCG: 50 INJECTION, SOLUTION INTRAMUSCULAR; INTRAVENOUS at 15:45

## 2018-03-22 RX ADMIN — ACETAMINOPHEN 650 MG: 325 TABLET, FILM COATED ORAL at 23:43

## 2018-03-22 RX ADMIN — SODIUM CHLORIDE, POTASSIUM CHLORIDE, SODIUM LACTATE AND CALCIUM CHLORIDE: 600; 310; 30; 20 INJECTION, SOLUTION INTRAVENOUS at 09:00

## 2018-03-22 ASSESSMENT — COPD QUESTIONNAIRES
HAVE YOU SMOKED AT LEAST 100 CIGARETTES IN YOUR ENTIRE LIFE: YES
DURING THE PAST 4 WEEKS HOW MUCH DID YOU FEEL SHORT OF BREATH: NONE/LITTLE OF THE TIME
DO YOU EVER COUGH UP ANY MUCUS OR PHLEGM?: NO/ONLY WITH OCCASIONAL COLDS OR INFECTIONS
COPD SCREENING SCORE: 4

## 2018-03-22 ASSESSMENT — COGNITIVE AND FUNCTIONAL STATUS - GENERAL
TURNING FROM BACK TO SIDE WHILE IN FLAT BAD: A LITTLE
MOVING TO AND FROM BED TO CHAIR: A LITTLE
HELP NEEDED FOR BATHING: A LITTLE
WALKING IN HOSPITAL ROOM: A LOT
SUGGESTED CMS G CODE MODIFIER MOBILITY: CK
DRESSING REGULAR UPPER BODY CLOTHING: A LITTLE
MOVING FROM LYING ON BACK TO SITTING ON SIDE OF FLAT BED: A LITTLE
DRESSING REGULAR LOWER BODY CLOTHING: A LITTLE
SUGGESTED CMS G CODE MODIFIER DAILY ACTIVITY: CJ
MOBILITY SCORE: 16
DAILY ACTIVITIY SCORE: 20
STANDING UP FROM CHAIR USING ARMS: A LITTLE
CLIMB 3 TO 5 STEPS WITH RAILING: A LOT
TOILETING: A LITTLE

## 2018-03-22 ASSESSMENT — PATIENT HEALTH QUESTIONNAIRE - PHQ9
2. FEELING DOWN, DEPRESSED, IRRITABLE, OR HOPELESS: NOT AT ALL
SUM OF ALL RESPONSES TO PHQ9 QUESTIONS 1 AND 2: 0
1. LITTLE INTEREST OR PLEASURE IN DOING THINGS: NOT AT ALL

## 2018-03-22 ASSESSMENT — PAIN SCALES - GENERAL
PAINLEVEL_OUTOF10: 8
PAINLEVEL_OUTOF10: 6
PAINLEVEL_OUTOF10: 10
PAINLEVEL_OUTOF10: 7
PAINLEVEL_OUTOF10: 5
PAINLEVEL_OUTOF10: 10
PAINLEVEL_OUTOF10: 5
PAINLEVEL_OUTOF10: 7

## 2018-03-22 ASSESSMENT — LIFESTYLE VARIABLES
DO YOU DRINK ALCOHOL: NO
EVER_SMOKED: YES

## 2018-03-22 NOTE — ED NOTES
Splint replaced, pressure dressing to left foot as bleeding is steady stream without pressure.  VS stable.
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
## 2018-03-22 NOTE — ED NOTES
Dr Wayne evaluated back and neck, c collar removed, transferred to [facility], new warm blankets placed, Patient to CT, belongings brought to rm 16, report to Екатерина MARTELL RN
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
## 2018-03-22 NOTE — ED NOTES
Splint partially removed to allow vascular studies to be done.  Pt medicated with dilaudid earlier and is feeling much more comfortable.

## 2018-03-22 NOTE — OR NURSING
Pt's brother called to be sure MDs aware of health issues, specifically history of MI.  Per notes, there is evidence MDs aware.  Also,gave contact information which was written on a yellow card and taken to OR control room to be put on chart.

## 2018-03-22 NOTE — RESPIRATORY CARE
RT attended trauma yellow. Pt placed on 5 LPM NC, 97%. Pt given sedation per MD. RT at head of bed to monitor airway.
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
## 2018-03-22 NOTE — ED NOTES
Med Rec completed per patient  Allergies reviewed  No ORAL antibiotics in last 30 days    Patient stated he is not compliant on taking his medications, stated he had taken Plavix for 2 days but before that it had been a long time
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
## 2018-03-22 NOTE — ED NOTES
Some red drainage thru foot dressing.  Visitor at bedside.  Pt last ate last evening.  Had a glass of water about 0600 today.  Ready for OR.
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
## 2018-03-23 LAB
ANION GAP SERPL CALC-SCNC: 5 MMOL/L (ref 0–11.9)
BASOPHILS # BLD AUTO: 0.1 % (ref 0–1.8)
BASOPHILS # BLD: 0.01 K/UL (ref 0–0.12)
BUN SERPL-MCNC: 17 MG/DL (ref 8–22)
CALCIUM SERPL-MCNC: 8.5 MG/DL (ref 8.5–10.5)
CHLORIDE SERPL-SCNC: 108 MMOL/L (ref 96–112)
CO2 SERPL-SCNC: 22 MMOL/L (ref 20–33)
CREAT SERPL-MCNC: 0.78 MG/DL (ref 0.5–1.4)
EOSINOPHIL # BLD AUTO: 0 K/UL (ref 0–0.51)
EOSINOPHIL NFR BLD: 0 % (ref 0–6.9)
ERYTHROCYTE [DISTWIDTH] IN BLOOD BY AUTOMATED COUNT: 43 FL (ref 35.9–50)
GLUCOSE BLD-MCNC: 112 MG/DL (ref 65–99)
GLUCOSE BLD-MCNC: 130 MG/DL (ref 65–99)
GLUCOSE BLD-MCNC: 141 MG/DL (ref 65–99)
GLUCOSE SERPL-MCNC: 131 MG/DL (ref 65–99)
HCT VFR BLD AUTO: 33.3 % (ref 42–52)
HGB BLD-MCNC: 11.5 G/DL (ref 14–18)
IMM GRANULOCYTES # BLD AUTO: 0.07 K/UL (ref 0–0.11)
IMM GRANULOCYTES NFR BLD AUTO: 0.5 % (ref 0–0.9)
LYMPHOCYTES # BLD AUTO: 0.99 K/UL (ref 1–4.8)
LYMPHOCYTES NFR BLD: 7.7 % (ref 22–41)
MCH RBC QN AUTO: 31.1 PG (ref 27–33)
MCHC RBC AUTO-ENTMCNC: 34.8 G/DL (ref 33.7–35.3)
MCV RBC AUTO: 89.6 FL (ref 81.4–97.8)
MONOCYTES # BLD AUTO: 1.24 K/UL (ref 0–0.85)
MONOCYTES NFR BLD AUTO: 9.6 % (ref 0–13.4)
NEUTROPHILS # BLD AUTO: 10.56 K/UL (ref 1.82–7.42)
NEUTROPHILS NFR BLD: 82.1 % (ref 44–72)
NRBC # BLD AUTO: 0 K/UL
NRBC BLD-RTO: 0 /100 WBC
PLATELET # BLD AUTO: 206 K/UL (ref 164–446)
PMV BLD AUTO: 9.7 FL (ref 9–12.9)
POTASSIUM SERPL-SCNC: 4.1 MMOL/L (ref 3.6–5.5)
RBC # BLD AUTO: 3.66 M/UL (ref 4.7–6.1)
SODIUM SERPL-SCNC: 135 MMOL/L (ref 135–145)
WBC # BLD AUTO: 12.9 K/UL (ref 4.8–10.8)

## 2018-03-23 PROCEDURE — A9270 NON-COVERED ITEM OR SERVICE: HCPCS | Performed by: SURGERY

## 2018-03-23 PROCEDURE — 700111 HCHG RX REV CODE 636 W/ 250 OVERRIDE (IP): Performed by: SURGERY

## 2018-03-23 PROCEDURE — 700102 HCHG RX REV CODE 250 W/ 637 OVERRIDE(OP): Performed by: PHYSICIAN ASSISTANT

## 2018-03-23 PROCEDURE — 82962 GLUCOSE BLOOD TEST: CPT

## 2018-03-23 PROCEDURE — 700112 HCHG RX REV CODE 229: Performed by: SURGERY

## 2018-03-23 PROCEDURE — 85025 COMPLETE CBC W/AUTO DIFF WBC: CPT

## 2018-03-23 PROCEDURE — 700105 HCHG RX REV CODE 258: Performed by: SURGERY

## 2018-03-23 PROCEDURE — 700102 HCHG RX REV CODE 250 W/ 637 OVERRIDE(OP): Performed by: SURGERY

## 2018-03-23 PROCEDURE — 97166 OT EVAL MOD COMPLEX 45 MIN: CPT

## 2018-03-23 PROCEDURE — A9270 NON-COVERED ITEM OR SERVICE: HCPCS | Performed by: PHYSICIAN ASSISTANT

## 2018-03-23 PROCEDURE — 700111 HCHG RX REV CODE 636 W/ 250 OVERRIDE (IP): Performed by: ORTHOPAEDIC SURGERY

## 2018-03-23 PROCEDURE — 306263 VAC CANNISTER W/GEL 500ML: Performed by: SURGERY

## 2018-03-23 PROCEDURE — G8987 SELF CARE CURRENT STATUS: HCPCS | Mod: CJ

## 2018-03-23 PROCEDURE — 770006 HCHG ROOM/CARE - MED/SURG/GYN SEMI*

## 2018-03-23 PROCEDURE — 36415 COLL VENOUS BLD VENIPUNCTURE: CPT

## 2018-03-23 PROCEDURE — 80048 BASIC METABOLIC PNL TOTAL CA: CPT

## 2018-03-23 PROCEDURE — G8988 SELF CARE GOAL STATUS: HCPCS | Mod: CI

## 2018-03-23 RX ORDER — OXYCODONE HYDROCHLORIDE 5 MG/1
5 TABLET ORAL
Status: DISCONTINUED | OUTPATIENT
Start: 2018-03-23 | End: 2018-04-17 | Stop reason: HOSPADM

## 2018-03-23 RX ORDER — OXYCODONE HYDROCHLORIDE 10 MG/1
10 TABLET ORAL
Status: DISCONTINUED | OUTPATIENT
Start: 2018-03-23 | End: 2018-04-17 | Stop reason: HOSPADM

## 2018-03-23 RX ORDER — OXYCODONE HYDROCHLORIDE 5 MG/1
15 TABLET ORAL
Status: DISCONTINUED | OUTPATIENT
Start: 2018-03-23 | End: 2018-04-17 | Stop reason: HOSPADM

## 2018-03-23 RX ORDER — GABAPENTIN 100 MG/1
100 CAPSULE ORAL 3 TIMES DAILY
Status: DISCONTINUED | OUTPATIENT
Start: 2018-03-23 | End: 2018-04-12

## 2018-03-23 RX ORDER — OXYCODONE HYDROCHLORIDE 5 MG/1
5-15 TABLET ORAL
Status: DISCONTINUED | OUTPATIENT
Start: 2018-03-23 | End: 2018-03-23

## 2018-03-23 RX ADMIN — SODIUM CHLORIDE, POTASSIUM CHLORIDE, SODIUM LACTATE AND CALCIUM CHLORIDE: 600; 310; 30; 20 INJECTION, SOLUTION INTRAVENOUS at 06:03

## 2018-03-23 RX ADMIN — CEFAZOLIN SODIUM 2 G: 2 INJECTION, SOLUTION INTRAVENOUS at 06:02

## 2018-03-23 RX ADMIN — STANDARDIZED SENNA CONCENTRATE AND DOCUSATE SODIUM 1 TABLET: 8.6; 5 TABLET, FILM COATED ORAL at 20:36

## 2018-03-23 RX ADMIN — CEFAZOLIN SODIUM 2 G: 2 INJECTION, SOLUTION INTRAVENOUS at 13:00

## 2018-03-23 RX ADMIN — SODIUM CHLORIDE, POTASSIUM CHLORIDE, SODIUM LACTATE AND CALCIUM CHLORIDE: 600; 310; 30; 20 INJECTION, SOLUTION INTRAVENOUS at 17:22

## 2018-03-23 RX ADMIN — GABAPENTIN 100 MG: 100 CAPSULE ORAL at 14:57

## 2018-03-23 RX ADMIN — ACETAMINOPHEN 650 MG: 325 TABLET, FILM COATED ORAL at 22:57

## 2018-03-23 RX ADMIN — ACETAMINOPHEN 650 MG: 325 TABLET, FILM COATED ORAL at 06:02

## 2018-03-23 RX ADMIN — MORPHINE SULFATE 2 MG: 4 INJECTION INTRAVENOUS at 22:58

## 2018-03-23 RX ADMIN — MORPHINE SULFATE 2 MG: 4 INJECTION INTRAVENOUS at 15:41

## 2018-03-23 RX ADMIN — OXYCODONE HYDROCHLORIDE 10 MG: 10 TABLET ORAL at 01:27

## 2018-03-23 RX ADMIN — OXYCODONE HYDROCHLORIDE 5 MG: 5 TABLET ORAL at 08:47

## 2018-03-23 RX ADMIN — OXYCODONE HYDROCHLORIDE 15 MG: 10 TABLET ORAL at 20:37

## 2018-03-23 RX ADMIN — GABAPENTIN 100 MG: 100 CAPSULE ORAL at 20:34

## 2018-03-23 RX ADMIN — ACETAMINOPHEN 650 MG: 325 TABLET, FILM COATED ORAL at 17:03

## 2018-03-23 RX ADMIN — POLYETHYLENE GLYCOL 3350 1 PACKET: 17 POWDER, FOR SOLUTION ORAL at 20:35

## 2018-03-23 RX ADMIN — OXYCODONE HYDROCHLORIDE 10 MG: 10 TABLET ORAL at 04:37

## 2018-03-23 RX ADMIN — OXYCODONE HYDROCHLORIDE 5 MG: 5 TABLET ORAL at 13:07

## 2018-03-23 RX ADMIN — GENTAMICIN SULFATE 400 MG: 40 INJECTION, SOLUTION INTRAMUSCULAR; INTRAVENOUS at 10:18

## 2018-03-23 RX ADMIN — OXYCODONE HYDROCHLORIDE 15 MG: 10 TABLET ORAL at 17:06

## 2018-03-23 RX ADMIN — ACETAMINOPHEN 650 MG: 325 TABLET, FILM COATED ORAL at 13:00

## 2018-03-23 RX ADMIN — CEFAZOLIN SODIUM 2 G: 2 INJECTION, SOLUTION INTRAVENOUS at 22:41

## 2018-03-23 RX ADMIN — DOCUSATE SODIUM 100 MG: 100 CAPSULE ORAL at 20:34

## 2018-03-23 ASSESSMENT — ENCOUNTER SYMPTOMS
BLURRED VISION: 0
CHILLS: 0
MYALGIAS: 1
SENSORY CHANGE: 1
NAUSEA: 0
VOMITING: 0
FEVER: 0
ABDOMINAL PAIN: 0
DOUBLE VISION: 0
COUGH: 0
HEADACHES: 0

## 2018-03-23 ASSESSMENT — COGNITIVE AND FUNCTIONAL STATUS - GENERAL
TOILETING: A LITTLE
DRESSING REGULAR LOWER BODY CLOTHING: A LOT
SUGGESTED CMS G CODE MODIFIER DAILY ACTIVITY: CK
PERSONAL GROOMING: A LITTLE
HELP NEEDED FOR BATHING: A LITTLE
DAILY ACTIVITIY SCORE: 19

## 2018-03-23 ASSESSMENT — PAIN SCALES - GENERAL
PAINLEVEL_OUTOF10: 8
PAINLEVEL_OUTOF10: 8
PAINLEVEL_OUTOF10: 5
PAINLEVEL_OUTOF10: 4
PAINLEVEL_OUTOF10: 3
PAINLEVEL_OUTOF10: 2
PAINLEVEL_OUTOF10: 9
PAINLEVEL_OUTOF10: 4
PAINLEVEL_OUTOF10: 5
PAINLEVEL_OUTOF10: 5

## 2018-03-23 ASSESSMENT — LIFESTYLE VARIABLES
EVER_SMOKED: YES
DO YOU DRINK ALCOHOL: NO

## 2018-03-23 ASSESSMENT — COPD QUESTIONNAIRES
DO YOU EVER COUGH UP ANY MUCUS OR PHLEGM?: NO/ONLY WITH OCCASIONAL COLDS OR INFECTIONS
DURING THE PAST 4 WEEKS HOW MUCH DID YOU FEEL SHORT OF BREATH: NONE/LITTLE OF THE TIME
HAVE YOU SMOKED AT LEAST 100 CIGARETTES IN YOUR ENTIRE LIFE: YES
COPD SCREENING SCORE: 4

## 2018-03-23 ASSESSMENT — ACTIVITIES OF DAILY LIVING (ADL): TOILETING: INDEPENDENT

## 2018-03-23 NOTE — PROGRESS NOTES
Spoke with medical records and they cannot combine this chart with previous chart from last years admission when patient was admitted with an MI with stents placed until patient is discharged.   Last years medical record number: [id number].
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
## 2018-03-23 NOTE — PROGRESS NOTES
Vascular    Doing well  Pain controlled  Right foot bandaged  Good capillary refill  Sensation intact on the tips of the toes    No need for revascularization at this time.   Continue management per Ortho    Lucas Zepeda MD  General&Vascular Surgery  [facility]  Cell: [phone]  Office: [phone]
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
## 2018-03-23 NOTE — PROGRESS NOTES
Patient arrived to unit around 5pm. Vss, complains of pain to low back and LLE- patient refuses pain medication at this time and would like to take oxycodone when due. Patient with wound vac in place. LLE wrapped in ace wrap and splint. Cap refill <2 seconds. Will monitor.

## 2018-03-23 NOTE — CARE PLAN
"Problem: Safety  Goal: Will remain free from falls    Intervention: Implement fall precautions  Bed is in the lowest position, call light and belongings are within reach, treaded socks are on, DME is in the bathroom, bed alarm is on, rails are up, and hourly rounding is in place.       Problem: Pain Management  Goal: Pain level will decrease to patient's comfort goal    Intervention: Follow pain managment plan developed in collaboration with patient and Interdisciplinary Team  Pt complains of pain to the LLE. He initially was declining pain medication stating \"I don't want to build up at tolerance to soon. I would rather save them for when I need them\". He also reported concerns about becoming narcotic dependent. Education provided about narcotic tolerance and dependency, as well as how best to adequately control his pain. He is now agreeable to taking oxycodone q 3PRN and morphine for breakthrough pain. Foot is elevated and ice is in place.         "
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
## 2018-03-23 NOTE — PROGRESS NOTES
Patient seen and examined.  Full note will come shortly.    Ok to be off of ASA and plavix for surgery.    Rupesh Larson M.D.

## 2018-03-23 NOTE — THERAPY
"Occupational Therapy Evaluation completed.   Functional Status:  SBA supine to sit, sit to stand.  Pt able to sidestep along EOB with FWW w/CGA, easily maintaining NWB LLE.  Pt limited by pain but is very motivated.  Pt returned to bed with SBA.  Max A LB dressing.  Plan of Care: Will benefit from Occupational Therapy 3 times per week  Discharge Recommendations:  Equipment: Will Continue to Assess for Equipment Needs. Post-acute therapy Discharge to home with outpatient or home health for additional skilled therapy services.    See \"Rehab Therapy-Acute\" Patient Summary Report for complete documentation.    "

## 2018-03-24 PROBLEM — Z53.09 CONTRAINDICATION TO DEEP VEIN THROMBOSIS (DVT) PROPHYLAXIS: Status: ACTIVE | Noted: 2018-03-24

## 2018-03-24 LAB
ANION GAP SERPL CALC-SCNC: 4 MMOL/L (ref 0–11.9)
BASOPHILS # BLD AUTO: 0.4 % (ref 0–1.8)
BASOPHILS # BLD: 0.04 K/UL (ref 0–0.12)
BUN SERPL-MCNC: 16 MG/DL (ref 8–22)
CALCIUM SERPL-MCNC: 7.9 MG/DL (ref 8.5–10.5)
CHLORIDE SERPL-SCNC: 107 MMOL/L (ref 96–112)
CO2 SERPL-SCNC: 24 MMOL/L (ref 20–33)
CREAT SERPL-MCNC: 0.86 MG/DL (ref 0.5–1.4)
EOSINOPHIL # BLD AUTO: 0.11 K/UL (ref 0–0.51)
EOSINOPHIL NFR BLD: 1.1 % (ref 0–6.9)
ERYTHROCYTE [DISTWIDTH] IN BLOOD BY AUTOMATED COUNT: 44.2 FL (ref 35.9–50)
GENTAMICIN SERPL-MCNC: 0.22 UG/ML (ref 1–2)
GLUCOSE BLD-MCNC: 118 MG/DL (ref 65–99)
GLUCOSE BLD-MCNC: 145 MG/DL (ref 65–99)
GLUCOSE SERPL-MCNC: 111 MG/DL (ref 65–99)
HCT VFR BLD AUTO: 31.2 % (ref 42–52)
HGB BLD-MCNC: 10.3 G/DL (ref 14–18)
IMM GRANULOCYTES # BLD AUTO: 0.04 K/UL (ref 0–0.11)
IMM GRANULOCYTES NFR BLD AUTO: 0.4 % (ref 0–0.9)
LYMPHOCYTES # BLD AUTO: 1.72 K/UL (ref 1–4.8)
LYMPHOCYTES NFR BLD: 17.8 % (ref 22–41)
MCH RBC QN AUTO: 30.3 PG (ref 27–33)
MCHC RBC AUTO-ENTMCNC: 33 G/DL (ref 33.7–35.3)
MCV RBC AUTO: 91.8 FL (ref 81.4–97.8)
MONOCYTES # BLD AUTO: 1.22 K/UL (ref 0–0.85)
MONOCYTES NFR BLD AUTO: 12.6 % (ref 0–13.4)
NEUTROPHILS # BLD AUTO: 6.52 K/UL (ref 1.82–7.42)
NEUTROPHILS NFR BLD: 67.7 % (ref 44–72)
NRBC # BLD AUTO: 0 K/UL
NRBC BLD-RTO: 0 /100 WBC
PLATELET # BLD AUTO: 194 K/UL (ref 164–446)
PMV BLD AUTO: 10.1 FL (ref 9–12.9)
POTASSIUM SERPL-SCNC: 3.7 MMOL/L (ref 3.6–5.5)
RBC # BLD AUTO: 3.4 M/UL (ref 4.7–6.1)
SODIUM SERPL-SCNC: 135 MMOL/L (ref 135–145)
WBC # BLD AUTO: 9.7 K/UL (ref 4.8–10.8)

## 2018-03-24 PROCEDURE — A6223 GAUZE >16<=48 NO W/SAL W/O B: HCPCS | Performed by: ORTHOPAEDIC SURGERY

## 2018-03-24 PROCEDURE — 700101 HCHG RX REV CODE 250

## 2018-03-24 PROCEDURE — 501838 HCHG SUTURE GENERAL: Performed by: ORTHOPAEDIC SURGERY

## 2018-03-24 PROCEDURE — 160009 HCHG ANES TIME/MIN: Performed by: ORTHOPAEDIC SURGERY

## 2018-03-24 PROCEDURE — 700111 HCHG RX REV CODE 636 W/ 250 OVERRIDE (IP): Performed by: SURGERY

## 2018-03-24 PROCEDURE — A6222 GAUZE <=16 IN NO W/SAL W/O B: HCPCS | Performed by: ORTHOPAEDIC SURGERY

## 2018-03-24 PROCEDURE — 700111 HCHG RX REV CODE 636 W/ 250 OVERRIDE (IP): Performed by: ORTHOPAEDIC SURGERY

## 2018-03-24 PROCEDURE — A9270 NON-COVERED ITEM OR SERVICE: HCPCS | Performed by: PHYSICIAN ASSISTANT

## 2018-03-24 PROCEDURE — 700102 HCHG RX REV CODE 250 W/ 637 OVERRIDE(OP): Performed by: PHYSICIAN ASSISTANT

## 2018-03-24 PROCEDURE — 0KBW0ZZ EXCISION OF LEFT FOOT MUSCLE, OPEN APPROACH: ICD-10-PCS | Performed by: ORTHOPAEDIC SURGERY

## 2018-03-24 PROCEDURE — 700111 HCHG RX REV CODE 636 W/ 250 OVERRIDE (IP)

## 2018-03-24 PROCEDURE — 160035 HCHG PACU - 1ST 60 MINS PHASE I: Performed by: ORTHOPAEDIC SURGERY

## 2018-03-24 PROCEDURE — 501330 HCHG SET, CYSTO IRRIG TUBING: Performed by: ORTHOPAEDIC SURGERY

## 2018-03-24 PROCEDURE — 160038 HCHG SURGERY MINUTES - EA ADDL 1 MIN LEVEL 2: Performed by: ORTHOPAEDIC SURGERY

## 2018-03-24 PROCEDURE — 160048 HCHG OR STATISTICAL LEVEL 1-5: Performed by: ORTHOPAEDIC SURGERY

## 2018-03-24 PROCEDURE — 160027 HCHG SURGERY MINUTES - 1ST 30 MINS LEVEL 2: Performed by: ORTHOPAEDIC SURGERY

## 2018-03-24 PROCEDURE — 700112 HCHG RX REV CODE 229: Performed by: SURGERY

## 2018-03-24 PROCEDURE — 700102 HCHG RX REV CODE 250 W/ 637 OVERRIDE(OP): Performed by: SURGERY

## 2018-03-24 PROCEDURE — 700102 HCHG RX REV CODE 250 W/ 637 OVERRIDE(OP)

## 2018-03-24 PROCEDURE — 160002 HCHG RECOVERY MINUTES (STAT): Performed by: ORTHOPAEDIC SURGERY

## 2018-03-24 PROCEDURE — 501488 HCHG SUCTION CANN, WOUNDVAC TRAC: Performed by: ORTHOPAEDIC SURGERY

## 2018-03-24 PROCEDURE — 500881 HCHG PACK, EXTREMITY: Performed by: ORTHOPAEDIC SURGERY

## 2018-03-24 PROCEDURE — 80048 BASIC METABOLIC PNL TOTAL CA: CPT

## 2018-03-24 PROCEDURE — 770006 HCHG ROOM/CARE - MED/SURG/GYN SEMI*

## 2018-03-24 PROCEDURE — 36415 COLL VENOUS BLD VENIPUNCTURE: CPT

## 2018-03-24 PROCEDURE — A9270 NON-COVERED ITEM OR SERVICE: HCPCS

## 2018-03-24 PROCEDURE — 160036 HCHG PACU - EA ADDL 30 MINS PHASE I: Performed by: ORTHOPAEDIC SURGERY

## 2018-03-24 PROCEDURE — A9270 NON-COVERED ITEM OR SERVICE: HCPCS | Performed by: SURGERY

## 2018-03-24 PROCEDURE — A6550 NEG PRES WOUND THER DRSG SET: HCPCS | Performed by: ORTHOPAEDIC SURGERY

## 2018-03-24 PROCEDURE — 0JQR0ZZ REPAIR LEFT FOOT SUBCUTANEOUS TISSUE AND FASCIA, OPEN APPROACH: ICD-10-PCS | Performed by: ORTHOPAEDIC SURGERY

## 2018-03-24 PROCEDURE — 82962 GLUCOSE BLOOD TEST: CPT | Mod: 91

## 2018-03-24 PROCEDURE — 85025 COMPLETE CBC W/AUTO DIFF WBC: CPT

## 2018-03-24 PROCEDURE — 80170 ASSAY OF GENTAMICIN: CPT

## 2018-03-24 PROCEDURE — 700105 HCHG RX REV CODE 258: Performed by: SURGERY

## 2018-03-24 RX ORDER — MORPHINE SULFATE 10 MG/ML
INJECTION, SOLUTION INTRAMUSCULAR; INTRAVENOUS
Status: COMPLETED
Start: 2018-03-24 | End: 2018-03-24

## 2018-03-24 RX ORDER — MORPHINE SULFATE 4 MG/ML
INJECTION, SOLUTION INTRAMUSCULAR; INTRAVENOUS
Status: COMPLETED
Start: 2018-03-24 | End: 2018-03-24

## 2018-03-24 RX ORDER — OXYCODONE HCL 5 MG/5 ML
SOLUTION, ORAL ORAL
Status: COMPLETED
Start: 2018-03-24 | End: 2018-03-24

## 2018-03-24 RX ADMIN — ACETAMINOPHEN 650 MG: 325 TABLET, FILM COATED ORAL at 16:40

## 2018-03-24 RX ADMIN — DOCUSATE SODIUM 100 MG: 100 CAPSULE ORAL at 09:12

## 2018-03-24 RX ADMIN — MORPHINE SULFATE 5 MG: 10 INJECTION INTRAVENOUS at 14:00

## 2018-03-24 RX ADMIN — CEFAZOLIN SODIUM 2 G: 2 INJECTION, SOLUTION INTRAVENOUS at 05:23

## 2018-03-24 RX ADMIN — OXYCODONE HYDROCHLORIDE 15 MG: 10 TABLET ORAL at 04:11

## 2018-03-24 RX ADMIN — HYDROCODONE BITARTRATE AND ACETAMINOPHEN 30 ML: 2.5; 108 SOLUTION ORAL at 13:55

## 2018-03-24 RX ADMIN — GABAPENTIN 100 MG: 100 CAPSULE ORAL at 16:40

## 2018-03-24 RX ADMIN — FENTANYL CITRATE 50 MCG: 50 INJECTION, SOLUTION INTRAMUSCULAR; INTRAVENOUS at 13:55

## 2018-03-24 RX ADMIN — POLYETHYLENE GLYCOL 3350 1 PACKET: 17 POWDER, FOR SOLUTION ORAL at 21:25

## 2018-03-24 RX ADMIN — MORPHINE SULFATE 4 MG: 4 INJECTION INTRAVENOUS at 14:51

## 2018-03-24 RX ADMIN — SODIUM CHLORIDE, POTASSIUM CHLORIDE, SODIUM LACTATE AND CALCIUM CHLORIDE: 600; 310; 30; 20 INJECTION, SOLUTION INTRAVENOUS at 04:17

## 2018-03-24 RX ADMIN — GENTAMICIN SULFATE 400 MG: 40 INJECTION, SOLUTION INTRAMUSCULAR; INTRAVENOUS at 09:23

## 2018-03-24 RX ADMIN — SODIUM CHLORIDE, POTASSIUM CHLORIDE, SODIUM LACTATE AND CALCIUM CHLORIDE: 600; 310; 30; 20 INJECTION, SOLUTION INTRAVENOUS at 21:26

## 2018-03-24 RX ADMIN — ACETAMINOPHEN 650 MG: 325 TABLET, FILM COATED ORAL at 05:22

## 2018-03-24 RX ADMIN — MORPHINE SULFATE 5 MG: 10 INJECTION INTRAVENOUS at 14:35

## 2018-03-24 RX ADMIN — MORPHINE SULFATE 5 MG: 10 INJECTION INTRAVENOUS at 14:05

## 2018-03-24 RX ADMIN — OXYCODONE HYDROCHLORIDE 10 MG: 10 TABLET ORAL at 09:53

## 2018-03-24 RX ADMIN — STANDARDIZED SENNA CONCENTRATE AND DOCUSATE SODIUM 1 TABLET: 8.6; 5 TABLET, FILM COATED ORAL at 21:24

## 2018-03-24 RX ADMIN — DOCUSATE SODIUM 100 MG: 100 CAPSULE ORAL at 21:24

## 2018-03-24 RX ADMIN — FENTANYL CITRATE 50 MCG: 50 INJECTION, SOLUTION INTRAMUSCULAR; INTRAVENOUS at 14:07

## 2018-03-24 RX ADMIN — CEFAZOLIN SODIUM 2 G: 2 INJECTION, SOLUTION INTRAVENOUS at 21:24

## 2018-03-24 RX ADMIN — CEFAZOLIN SODIUM 2 G: 2 INJECTION, SOLUTION INTRAVENOUS at 16:40

## 2018-03-24 RX ADMIN — MORPHINE SULFATE 5 MG: 10 INJECTION INTRAVENOUS at 14:24

## 2018-03-24 RX ADMIN — OXYCODONE HYDROCHLORIDE 15 MG: 10 TABLET ORAL at 00:00

## 2018-03-24 RX ADMIN — FENTANYL CITRATE 50 MCG: 50 INJECTION, SOLUTION INTRAMUSCULAR; INTRAVENOUS at 13:50

## 2018-03-24 RX ADMIN — GABAPENTIN 100 MG: 100 CAPSULE ORAL at 21:24

## 2018-03-24 RX ADMIN — GABAPENTIN 100 MG: 100 CAPSULE ORAL at 09:12

## 2018-03-24 RX ADMIN — FENTANYL CITRATE 50 MCG: 50 INJECTION, SOLUTION INTRAMUSCULAR; INTRAVENOUS at 14:15

## 2018-03-24 ASSESSMENT — PAIN SCALES - GENERAL
PAINLEVEL_OUTOF10: 3
PAINLEVEL_OUTOF10: 0
PAINLEVEL_OUTOF10: 4
PAINLEVEL_OUTOF10: 6
PAINLEVEL_OUTOF10: 0
PAINLEVEL_OUTOF10: 8
PAINLEVEL_OUTOF10: 6
PAINLEVEL_OUTOF10: 4

## 2018-03-24 ASSESSMENT — ENCOUNTER SYMPTOMS
HEADACHES: 0
DOUBLE VISION: 0
FEVER: 0
NAUSEA: 0
CHILLS: 0
MYALGIAS: 1
COUGH: 0
SENSORY CHANGE: 1
VOMITING: 0
ABDOMINAL PAIN: 0
BLURRED VISION: 0

## 2018-03-24 NOTE — PROGRESS NOTES
Covering for Dr. Aguayo  Patient with no c/o  Splint/VAC in place   AVSS  Hct 31  OR today (possible) for I&D/VAC change

## 2018-03-24 NOTE — OR SURGEON
Immediate Post OP Note    PreOp Diagnosis: Left foot wound    PostOp Diagnosis: Same    Procedure(s):  IRRIGATION & DEBRIDEMENT ORTHO VAC CHANGE - Wound Class: Contaminated  Delayed primary wound closure    Surgeon(s):  Song Alegre M.D.    Anesthesiologist/Type of Anesthesia:  Anesthesiologist: Watson Gonsalez M.D./General    Surgical Staff:  Circulator: Hebert Sorto R.N.  Scrub Person: Tyron MCKEON Donor  First Assist: Savage Gasca P.A.-C.    Specimens removed if any:  * No specimens in log *    Estimated Blood Loss: 5 cc    Findings: No pus, no debris, skin edges re-approximated and still apparently viable    Complications: None        3/24/2018 1:48 PM Song Alegre M.D.
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
## 2018-03-24 NOTE — OR NURSING
Pt had been having surgical pain in left foot.  Rated 6-7/10.  In PACU until Dr Gonsalez can evaluate for nerve block  Pt now reports pain 2/10.  Dr Gonsalez satisfied with pt pain control. Nerve block not done.    Updated Bernadette MARTINEZ regarding above.      Pt has been axox4  vss  Pleasant/ cooperative/ conversant
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
## 2018-03-24 NOTE — CARE PLAN
Problem: Pain Management  Goal: Pain level will decrease to patient's comfort goal  Patient medicated with Oxycodone and Morphine for pain rated at 8 /10.  Cold pack applied and left foot elevated.

## 2018-03-24 NOTE — CARE PLAN
Problem: Safety  Goal: Will remain free from falls  Patient's room is located near nursing station.  Patient is compliant with use of call light and waits for assistance by staff.  Frequent rounding.

## 2018-03-24 NOTE — PROGRESS NOTES
Vascular    Doing well  Pain controlled  Right foot bandaged  Good capillary refill in all toes  Sensation intact on the tips of the toes    No need for revascularization at this time.   Continue management per Ortho    Lucas Zepeda MD  General&Vascular Surgery  [facility]  Cell: [phone]  Office: [phone]

## 2018-03-24 NOTE — THERAPY
PT orders received and acknowledged. Attempted PT eval this am, however, RN states pt is going back to surgery today for additional I&D and replacement of wound vac. Will complete PT Eval as able.

## 2018-03-25 ENCOUNTER — APPOINTMENT (OUTPATIENT)
Dept: RADIOLOGY | Facility: MEDICAL CENTER | Age: 63
DRG: 475 | End: 2018-03-25
Attending: NURSE PRACTITIONER
Payer: COMMERCIAL

## 2018-03-25 PROBLEM — M54.50 LOW BACK PAIN: Status: ACTIVE | Noted: 2018-03-25

## 2018-03-25 LAB
ANION GAP SERPL CALC-SCNC: 4 MMOL/L (ref 0–11.9)
BASOPHILS # BLD AUTO: 0.3 % (ref 0–1.8)
BASOPHILS # BLD: 0.02 K/UL (ref 0–0.12)
BUN SERPL-MCNC: 14 MG/DL (ref 8–22)
CALCIUM SERPL-MCNC: 7.9 MG/DL (ref 8.5–10.5)
CHLORIDE SERPL-SCNC: 106 MMOL/L (ref 96–112)
CO2 SERPL-SCNC: 27 MMOL/L (ref 20–33)
CREAT SERPL-MCNC: 0.87 MG/DL (ref 0.5–1.4)
EOSINOPHIL # BLD AUTO: 0.15 K/UL (ref 0–0.51)
EOSINOPHIL NFR BLD: 2.2 % (ref 0–6.9)
ERYTHROCYTE [DISTWIDTH] IN BLOOD BY AUTOMATED COUNT: 44.3 FL (ref 35.9–50)
GLUCOSE SERPL-MCNC: 100 MG/DL (ref 65–99)
HCT VFR BLD AUTO: 25.8 % (ref 42–52)
HGB BLD-MCNC: 8.6 G/DL (ref 14–18)
IMM GRANULOCYTES # BLD AUTO: 0.02 K/UL (ref 0–0.11)
IMM GRANULOCYTES NFR BLD AUTO: 0.3 % (ref 0–0.9)
LYMPHOCYTES # BLD AUTO: 1.34 K/UL (ref 1–4.8)
LYMPHOCYTES NFR BLD: 19.7 % (ref 22–41)
MCH RBC QN AUTO: 30.9 PG (ref 27–33)
MCHC RBC AUTO-ENTMCNC: 33.3 G/DL (ref 33.7–35.3)
MCV RBC AUTO: 92.8 FL (ref 81.4–97.8)
MONOCYTES # BLD AUTO: 0.92 K/UL (ref 0–0.85)
MONOCYTES NFR BLD AUTO: 13.5 % (ref 0–13.4)
NEUTROPHILS # BLD AUTO: 4.35 K/UL (ref 1.82–7.42)
NEUTROPHILS NFR BLD: 64 % (ref 44–72)
NRBC # BLD AUTO: 0 K/UL
NRBC BLD-RTO: 0 /100 WBC
PLATELET # BLD AUTO: 156 K/UL (ref 164–446)
PMV BLD AUTO: 9.5 FL (ref 9–12.9)
POTASSIUM SERPL-SCNC: 4.1 MMOL/L (ref 3.6–5.5)
RBC # BLD AUTO: 2.78 M/UL (ref 4.7–6.1)
SODIUM SERPL-SCNC: 137 MMOL/L (ref 135–145)
WBC # BLD AUTO: 6.8 K/UL (ref 4.8–10.8)

## 2018-03-25 PROCEDURE — G8979 MOBILITY GOAL STATUS: HCPCS | Mod: CI

## 2018-03-25 PROCEDURE — 80048 BASIC METABOLIC PNL TOTAL CA: CPT

## 2018-03-25 PROCEDURE — 97161 PT EVAL LOW COMPLEX 20 MIN: CPT

## 2018-03-25 PROCEDURE — A9270 NON-COVERED ITEM OR SERVICE: HCPCS | Performed by: PHYSICIAN ASSISTANT

## 2018-03-25 PROCEDURE — 72131 CT LUMBAR SPINE W/O DYE: CPT

## 2018-03-25 PROCEDURE — 770006 HCHG ROOM/CARE - MED/SURG/GYN SEMI*

## 2018-03-25 PROCEDURE — 700111 HCHG RX REV CODE 636 W/ 250 OVERRIDE (IP): Performed by: SURGERY

## 2018-03-25 PROCEDURE — 700102 HCHG RX REV CODE 250 W/ 637 OVERRIDE(OP): Performed by: PHYSICIAN ASSISTANT

## 2018-03-25 PROCEDURE — 85025 COMPLETE CBC W/AUTO DIFF WBC: CPT

## 2018-03-25 PROCEDURE — 700112 HCHG RX REV CODE 229: Performed by: SURGERY

## 2018-03-25 PROCEDURE — 700105 HCHG RX REV CODE 258: Performed by: SURGERY

## 2018-03-25 PROCEDURE — A9270 NON-COVERED ITEM OR SERVICE: HCPCS | Performed by: SURGERY

## 2018-03-25 PROCEDURE — 700111 HCHG RX REV CODE 636 W/ 250 OVERRIDE (IP): Performed by: ORTHOPAEDIC SURGERY

## 2018-03-25 PROCEDURE — 700102 HCHG RX REV CODE 250 W/ 637 OVERRIDE(OP): Performed by: SURGERY

## 2018-03-25 PROCEDURE — G8978 MOBILITY CURRENT STATUS: HCPCS | Mod: CJ

## 2018-03-25 PROCEDURE — 36415 COLL VENOUS BLD VENIPUNCTURE: CPT

## 2018-03-25 RX ADMIN — DOCUSATE SODIUM 100 MG: 100 CAPSULE ORAL at 08:34

## 2018-03-25 RX ADMIN — OXYCODONE HYDROCHLORIDE 10 MG: 10 TABLET ORAL at 20:04

## 2018-03-25 RX ADMIN — CEFAZOLIN SODIUM 2 G: 2 INJECTION, SOLUTION INTRAVENOUS at 14:11

## 2018-03-25 RX ADMIN — CEFAZOLIN SODIUM 2 G: 2 INJECTION, SOLUTION INTRAVENOUS at 06:09

## 2018-03-25 RX ADMIN — STANDARDIZED SENNA CONCENTRATE AND DOCUSATE SODIUM 1 TABLET: 8.6; 5 TABLET, FILM COATED ORAL at 20:03

## 2018-03-25 RX ADMIN — OXYCODONE HYDROCHLORIDE 10 MG: 10 TABLET ORAL at 16:58

## 2018-03-25 RX ADMIN — ACETAMINOPHEN 650 MG: 325 TABLET, FILM COATED ORAL at 23:16

## 2018-03-25 RX ADMIN — ACETAMINOPHEN 650 MG: 325 TABLET, FILM COATED ORAL at 16:57

## 2018-03-25 RX ADMIN — ACETAMINOPHEN 650 MG: 325 TABLET, FILM COATED ORAL at 06:09

## 2018-03-25 RX ADMIN — OXYCODONE HYDROCHLORIDE 10 MG: 10 TABLET ORAL at 23:16

## 2018-03-25 RX ADMIN — OXYCODONE HYDROCHLORIDE 10 MG: 10 TABLET ORAL at 08:33

## 2018-03-25 RX ADMIN — MORPHINE SULFATE 2 MG: 4 INJECTION INTRAVENOUS at 21:34

## 2018-03-25 RX ADMIN — GABAPENTIN 100 MG: 100 CAPSULE ORAL at 08:34

## 2018-03-25 RX ADMIN — ACETAMINOPHEN 650 MG: 325 TABLET, FILM COATED ORAL at 11:24

## 2018-03-25 RX ADMIN — GABAPENTIN 100 MG: 100 CAPSULE ORAL at 20:03

## 2018-03-25 RX ADMIN — DOCUSATE SODIUM 100 MG: 100 CAPSULE ORAL at 20:03

## 2018-03-25 RX ADMIN — POLYETHYLENE GLYCOL 3350 1 PACKET: 17 POWDER, FOR SOLUTION ORAL at 08:34

## 2018-03-25 RX ADMIN — MAGNESIUM HYDROXIDE 30 ML: 400 SUSPENSION ORAL at 08:34

## 2018-03-25 RX ADMIN — GENTAMICIN SULFATE 400 MG: 40 INJECTION, SOLUTION INTRAMUSCULAR; INTRAVENOUS at 08:55

## 2018-03-25 RX ADMIN — OXYCODONE HYDROCHLORIDE 10 MG: 10 TABLET ORAL at 11:24

## 2018-03-25 RX ADMIN — GABAPENTIN 100 MG: 100 CAPSULE ORAL at 14:11

## 2018-03-25 RX ADMIN — CEFAZOLIN SODIUM 2 G: 2 INJECTION, SOLUTION INTRAVENOUS at 21:34

## 2018-03-25 RX ADMIN — POLYETHYLENE GLYCOL 3350 1 PACKET: 17 POWDER, FOR SOLUTION ORAL at 20:05

## 2018-03-25 RX ADMIN — OXYCODONE HYDROCHLORIDE 10 MG: 10 TABLET ORAL at 04:14

## 2018-03-25 RX ADMIN — ACETAMINOPHEN 650 MG: 325 TABLET, FILM COATED ORAL at 00:11

## 2018-03-25 ASSESSMENT — PAIN SCALES - GENERAL
PAINLEVEL_OUTOF10: 2
PAINLEVEL_OUTOF10: 4
PAINLEVEL_OUTOF10: 8
PAINLEVEL_OUTOF10: 0
PAINLEVEL_OUTOF10: 0
PAINLEVEL_OUTOF10: 8
PAINLEVEL_OUTOF10: 7
PAINLEVEL_OUTOF10: 0
PAINLEVEL_OUTOF10: 7

## 2018-03-25 ASSESSMENT — GAIT ASSESSMENTS
ASSISTIVE DEVICE: FRONT WHEEL WALKER
GAIT LEVEL OF ASSIST: CONTACT GUARD ASSIST
DEVIATION: ANTALGIC;STEP TO
DISTANCE (FEET): 15

## 2018-03-25 ASSESSMENT — ENCOUNTER SYMPTOMS
BLURRED VISION: 0
ABDOMINAL PAIN: 0
CHILLS: 0
MYALGIAS: 1
COUGH: 0
DOUBLE VISION: 0
SENSORY CHANGE: 1
NAUSEA: 0
HEADACHES: 0
VOMITING: 0
FEVER: 0
ROS GI COMMENTS: NO BM SINCE ADMIT

## 2018-03-25 ASSESSMENT — COGNITIVE AND FUNCTIONAL STATUS - GENERAL
MOVING FROM LYING ON BACK TO SITTING ON SIDE OF FLAT BED: A LITTLE
CLIMB 3 TO 5 STEPS WITH RAILING: A LOT
WALKING IN HOSPITAL ROOM: A LITTLE
SUGGESTED CMS G CODE MODIFIER MOBILITY: CK
STANDING UP FROM CHAIR USING ARMS: A LITTLE
MOVING TO AND FROM BED TO CHAIR: A LITTLE
MOBILITY SCORE: 17
TURNING FROM BACK TO SIDE WHILE IN FLAT BAD: A LITTLE

## 2018-03-25 NOTE — PROGRESS NOTES
Hourly rounding, call bell within reach. Patient educated about plan of care, pain management, call bell use, and mobility. Patient demonstrates good bed mobility, awaiting surgery this Am. Brother at bedside all day, participating in care. Patient arrived back from surgery in stable condition but still requiring 1lpm O2. Instructed on IS use. Patient able to wiggle toes on left side with minimal numbness. Wound vac in place. Patient denied pain on arrival to the floor. Will monitor.

## 2018-03-25 NOTE — THERAPY
"Physical Therapy Evaluation completed.   Bed Mobility:  Supine to Sit: Supervised  Transfers: Sit to Stand: Stand by Assist  Gait: Level Of Assist: Contact Guard Assist with Front-Wheel Walker       Plan of Care: Will benefit from Physical Therapy 3 times per week  Discharge Recommendations: Equipment: Front-Wheel Walker. Post-acute therapy Discharge to home with outpatient or home health for additional skilled therapy services.    See \"Rehab Therapy-Acute\" Patient Summary Report for complete documentation.     "

## 2018-03-25 NOTE — PROGRESS NOTES
Hourly rounding, call bell within reach. Patient educated about plan of care, pain management, call bell use, and mobility. Patient demonstrated good mobility with walker and one assist. Worked with PT today. Wound vac and dressing still in place to left leg without issue. No worsening symptoms to leg. Patient did complain of back pain and discomfort today. MD aware, scan preformed, negative for acute fracture. Pain controlled with oral medications. Fever today, tylenol given, continued on IV antibiotics. Will monitor.
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
## 2018-03-25 NOTE — PROGRESS NOTES
Pain better today  Toes warm  Splint intact  VAC in place  AVSS  Hct 25    Plan:    Some elevation  VAC  DVT proph

## 2018-03-25 NOTE — CARE PLAN
Problem: Communication  Goal: The ability to communicate needs accurately and effectively will improve  Outcome: PROGRESSING AS EXPECTED  Pt will verbalize understanding of care.

## 2018-03-26 PROBLEM — Z78.9 NO CONTRAINDICATION TO DEEP VEIN THROMBOSIS (DVT) PROPHYLAXIS: Status: ACTIVE | Noted: 2018-03-24

## 2018-03-26 LAB
ANION GAP SERPL CALC-SCNC: 5 MMOL/L (ref 0–11.9)
BASOPHILS # BLD AUTO: 0.2 % (ref 0–1.8)
BASOPHILS # BLD: 0.02 K/UL (ref 0–0.12)
BUN SERPL-MCNC: 12 MG/DL (ref 8–22)
CALCIUM SERPL-MCNC: 8.1 MG/DL (ref 8.5–10.5)
CHLORIDE SERPL-SCNC: 105 MMOL/L (ref 96–112)
CO2 SERPL-SCNC: 23 MMOL/L (ref 20–33)
CREAT SERPL-MCNC: 0.79 MG/DL (ref 0.5–1.4)
EOSINOPHIL # BLD AUTO: 0.15 K/UL (ref 0–0.51)
EOSINOPHIL NFR BLD: 1.7 % (ref 0–6.9)
ERYTHROCYTE [DISTWIDTH] IN BLOOD BY AUTOMATED COUNT: 42.7 FL (ref 35.9–50)
GLUCOSE SERPL-MCNC: 127 MG/DL (ref 65–99)
HCT VFR BLD AUTO: 27.2 % (ref 42–52)
HGB BLD-MCNC: 9.4 G/DL (ref 14–18)
IMM GRANULOCYTES # BLD AUTO: 0.03 K/UL (ref 0–0.11)
IMM GRANULOCYTES NFR BLD AUTO: 0.3 % (ref 0–0.9)
LYMPHOCYTES # BLD AUTO: 1.16 K/UL (ref 1–4.8)
LYMPHOCYTES NFR BLD: 13.4 % (ref 22–41)
MAGNESIUM SERPL-MCNC: 2 MG/DL (ref 1.5–2.5)
MCH RBC QN AUTO: 31.5 PG (ref 27–33)
MCHC RBC AUTO-ENTMCNC: 34.6 G/DL (ref 33.7–35.3)
MCV RBC AUTO: 91.3 FL (ref 81.4–97.8)
MONOCYTES # BLD AUTO: 0.97 K/UL (ref 0–0.85)
MONOCYTES NFR BLD AUTO: 11.2 % (ref 0–13.4)
NEUTROPHILS # BLD AUTO: 6.35 K/UL (ref 1.82–7.42)
NEUTROPHILS NFR BLD: 73.2 % (ref 44–72)
NRBC # BLD AUTO: 0 K/UL
NRBC BLD-RTO: 0 /100 WBC
PHOSPHATE SERPL-MCNC: 2.9 MG/DL (ref 2.5–4.5)
PLATELET # BLD AUTO: 185 K/UL (ref 164–446)
PMV BLD AUTO: 9.7 FL (ref 9–12.9)
POTASSIUM SERPL-SCNC: 4 MMOL/L (ref 3.6–5.5)
RBC # BLD AUTO: 2.98 M/UL (ref 4.7–6.1)
SODIUM SERPL-SCNC: 133 MMOL/L (ref 135–145)
WBC # BLD AUTO: 8.7 K/UL (ref 4.8–10.8)

## 2018-03-26 PROCEDURE — A9270 NON-COVERED ITEM OR SERVICE: HCPCS | Performed by: SURGERY

## 2018-03-26 PROCEDURE — 700111 HCHG RX REV CODE 636 W/ 250 OVERRIDE (IP): Performed by: ORTHOPAEDIC SURGERY

## 2018-03-26 PROCEDURE — A9270 NON-COVERED ITEM OR SERVICE: HCPCS | Performed by: NURSE PRACTITIONER

## 2018-03-26 PROCEDURE — 84100 ASSAY OF PHOSPHORUS: CPT

## 2018-03-26 PROCEDURE — 83735 ASSAY OF MAGNESIUM: CPT

## 2018-03-26 PROCEDURE — 85025 COMPLETE CBC W/AUTO DIFF WBC: CPT

## 2018-03-26 PROCEDURE — 700102 HCHG RX REV CODE 250 W/ 637 OVERRIDE(OP): Performed by: NURSE PRACTITIONER

## 2018-03-26 PROCEDURE — 700102 HCHG RX REV CODE 250 W/ 637 OVERRIDE(OP): Performed by: SURGERY

## 2018-03-26 PROCEDURE — 80048 BASIC METABOLIC PNL TOTAL CA: CPT

## 2018-03-26 PROCEDURE — 36415 COLL VENOUS BLD VENIPUNCTURE: CPT

## 2018-03-26 PROCEDURE — 700105 HCHG RX REV CODE 258

## 2018-03-26 PROCEDURE — 770006 HCHG ROOM/CARE - MED/SURG/GYN SEMI*

## 2018-03-26 PROCEDURE — A9270 NON-COVERED ITEM OR SERVICE: HCPCS | Performed by: PHYSICIAN ASSISTANT

## 2018-03-26 PROCEDURE — 700111 HCHG RX REV CODE 636 W/ 250 OVERRIDE (IP): Performed by: NURSE PRACTITIONER

## 2018-03-26 PROCEDURE — 700111 HCHG RX REV CODE 636 W/ 250 OVERRIDE (IP): Performed by: SURGERY

## 2018-03-26 PROCEDURE — 700112 HCHG RX REV CODE 229: Performed by: SURGERY

## 2018-03-26 PROCEDURE — 700102 HCHG RX REV CODE 250 W/ 637 OVERRIDE(OP): Performed by: PHYSICIAN ASSISTANT

## 2018-03-26 RX ORDER — ASPIRIN 81 MG/1
81 TABLET, CHEWABLE ORAL DAILY
Status: DISCONTINUED | OUTPATIENT
Start: 2018-03-26 | End: 2018-04-17 | Stop reason: HOSPADM

## 2018-03-26 RX ORDER — SODIUM CHLORIDE 9 MG/ML
INJECTION, SOLUTION INTRAVENOUS
Status: COMPLETED
Start: 2018-03-26 | End: 2018-03-26

## 2018-03-26 RX ADMIN — OXYCODONE HYDROCHLORIDE 10 MG: 10 TABLET ORAL at 20:47

## 2018-03-26 RX ADMIN — GABAPENTIN 100 MG: 100 CAPSULE ORAL at 08:37

## 2018-03-26 RX ADMIN — ASPIRIN 81 MG: 81 TABLET, CHEWABLE ORAL at 10:47

## 2018-03-26 RX ADMIN — ACETAMINOPHEN 650 MG: 325 TABLET, FILM COATED ORAL at 05:26

## 2018-03-26 RX ADMIN — OXYCODONE HYDROCHLORIDE 10 MG: 10 TABLET ORAL at 05:26

## 2018-03-26 RX ADMIN — GABAPENTIN 100 MG: 100 CAPSULE ORAL at 20:48

## 2018-03-26 RX ADMIN — CEFAZOLIN SODIUM 2 G: 2 INJECTION, SOLUTION INTRAVENOUS at 05:27

## 2018-03-26 RX ADMIN — OXYCODONE HYDROCHLORIDE 15 MG: 10 TABLET ORAL at 10:47

## 2018-03-26 RX ADMIN — CEFAZOLIN SODIUM 2 G: 2 INJECTION, SOLUTION INTRAVENOUS at 14:07

## 2018-03-26 RX ADMIN — SODIUM CHLORIDE 500 ML: 9 INJECTION, SOLUTION INTRAVENOUS at 20:57

## 2018-03-26 RX ADMIN — POLYETHYLENE GLYCOL 3350 1 PACKET: 17 POWDER, FOR SOLUTION ORAL at 09:00

## 2018-03-26 RX ADMIN — MORPHINE SULFATE 2 MG: 4 INJECTION INTRAVENOUS at 18:18

## 2018-03-26 RX ADMIN — ENOXAPARIN SODIUM 30 MG: 100 INJECTION SUBCUTANEOUS at 20:48

## 2018-03-26 RX ADMIN — OXYCODONE HYDROCHLORIDE 15 MG: 10 TABLET ORAL at 14:08

## 2018-03-26 RX ADMIN — MORPHINE SULFATE 2 MG: 4 INJECTION INTRAVENOUS at 08:37

## 2018-03-26 RX ADMIN — CEFAZOLIN SODIUM 2 G: 2 INJECTION, SOLUTION INTRAVENOUS at 20:48

## 2018-03-26 RX ADMIN — ENOXAPARIN SODIUM 30 MG: 100 INJECTION SUBCUTANEOUS at 10:47

## 2018-03-26 RX ADMIN — MAGNESIUM HYDROXIDE 30 ML: 400 SUSPENSION ORAL at 08:37

## 2018-03-26 RX ADMIN — DOCUSATE SODIUM 100 MG: 100 CAPSULE ORAL at 08:37

## 2018-03-26 RX ADMIN — ACETAMINOPHEN 650 MG: 325 TABLET, FILM COATED ORAL at 17:24

## 2018-03-26 RX ADMIN — GABAPENTIN 100 MG: 100 CAPSULE ORAL at 14:07

## 2018-03-26 ASSESSMENT — ENCOUNTER SYMPTOMS
SENSORY CHANGE: 1
CHILLS: 0
VOMITING: 0
BLURRED VISION: 0
COUGH: 0
DOUBLE VISION: 0
HEADACHES: 0
FEVER: 0
ROS GI COMMENTS: NO BM SINCE ADMIT
NAUSEA: 0
ABDOMINAL PAIN: 0
MYALGIAS: 1

## 2018-03-26 ASSESSMENT — PAIN SCALES - GENERAL
PAINLEVEL_OUTOF10: 4
PAINLEVEL_OUTOF10: 7
PAINLEVEL_OUTOF10: 3
PAINLEVEL_OUTOF10: 6
PAINLEVEL_OUTOF10: 6
PAINLEVEL_OUTOF10: 5
PAINLEVEL_OUTOF10: 7
PAINLEVEL_OUTOF10: 4
PAINLEVEL_OUTOF10: 6
PAINLEVEL_OUTOF10: 3

## 2018-03-26 NOTE — PROGRESS NOTES
Assumed care of pt 1845. Pt resting in bed.  Pt reporting increased pain. Pt medicated per MAR. New IV started as old IV was leaking. Reviewed POC including safety, mobility, pain management, medication administration, DVT prophylaxis, bowel protocol, and future surgery. Call light within reach. Pt calls appropriately. Hourly rounding in place. Pt has no needs or concerns at this time.

## 2018-03-26 NOTE — CARE PLAN
"Problem: Safety  Goal: Will remain free from injury  Outcome: PROGRESSING AS EXPECTED  Pt educated on need to call before getting out of bed. Pt verbalizes understanding. Treaded socks on pt. Bed locked. DME in bathroom. Room safety check. Bed in lowest position. Pt calls for help appropriately on call light. Call light with in reach. Hourly rounding in place.      Problem: Bowel/Gastric:  Goal: Normal bowel function is maintained or improved  Outcome: PROGRESSING SLOWER THAN EXPECTED  Pt's reports last BM 3/22. Bowel protocol in place. Colace, Senokot, and Miralax given per MAR. Prune juice given. Pt reports increased activity, \" I think it's starting\".      "
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
## 2018-03-26 NOTE — DISCHARGE PLANNING
SW transferred NHP to pts floor to speak with pt. No further information given to NHP at this time. SW to remain available.

## 2018-03-26 NOTE — PROGRESS NOTES
"Pt complaining of loss of sensation in his left toes since last night. Pt's toes are cold and no cap refill is seen. Pulse unable to be detected due to splint in place. Page sent out to orthopedics for updates and orders. Pt is very anxious at this time, stating, \" I don't want to lose my foot\".   "
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
## 2018-03-26 NOTE — PROGRESS NOTES
Report received from MICHELLE Henry. Pt is AAOx4, no family at bedside. Assessment completed. WV to LLE, to be changed today after c/o loss of sensation. Patient is NWB per order. Pt complaining of pain 7/10 in his LLE. Medicated per MAR. Pt ambulates with assistance of 1 person and a FWW. Pt educated regarding plan of care, including pain management, dressing change, and WV management. All questions answered. Call light and personal belongings in reach. No additional needs at this time.
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
## 2018-03-27 LAB
ANION GAP SERPL CALC-SCNC: 7 MMOL/L (ref 0–11.9)
BASOPHILS # BLD AUTO: 0.2 % (ref 0–1.8)
BASOPHILS # BLD: 0.02 K/UL (ref 0–0.12)
BUN SERPL-MCNC: 15 MG/DL (ref 8–22)
CALCIUM SERPL-MCNC: 8.6 MG/DL (ref 8.5–10.5)
CHLORIDE SERPL-SCNC: 104 MMOL/L (ref 96–112)
CO2 SERPL-SCNC: 23 MMOL/L (ref 20–33)
CREAT SERPL-MCNC: 0.86 MG/DL (ref 0.5–1.4)
EOSINOPHIL # BLD AUTO: 0.19 K/UL (ref 0–0.51)
EOSINOPHIL NFR BLD: 2.1 % (ref 0–6.9)
ERYTHROCYTE [DISTWIDTH] IN BLOOD BY AUTOMATED COUNT: 41.9 FL (ref 35.9–50)
GLUCOSE SERPL-MCNC: 115 MG/DL (ref 65–99)
HCT VFR BLD AUTO: 28.2 % (ref 42–52)
HGB BLD-MCNC: 9.4 G/DL (ref 14–18)
IMM GRANULOCYTES # BLD AUTO: 0.03 K/UL (ref 0–0.11)
IMM GRANULOCYTES NFR BLD AUTO: 0.3 % (ref 0–0.9)
LYMPHOCYTES # BLD AUTO: 1.28 K/UL (ref 1–4.8)
LYMPHOCYTES NFR BLD: 13.9 % (ref 22–41)
MCH RBC QN AUTO: 30 PG (ref 27–33)
MCHC RBC AUTO-ENTMCNC: 33.3 G/DL (ref 33.7–35.3)
MCV RBC AUTO: 90.1 FL (ref 81.4–97.8)
MONOCYTES # BLD AUTO: 1.23 K/UL (ref 0–0.85)
MONOCYTES NFR BLD AUTO: 13.3 % (ref 0–13.4)
NEUTROPHILS # BLD AUTO: 6.48 K/UL (ref 1.82–7.42)
NEUTROPHILS NFR BLD: 70.2 % (ref 44–72)
NRBC # BLD AUTO: 0 K/UL
NRBC BLD-RTO: 0 /100 WBC
PLATELET # BLD AUTO: 224 K/UL (ref 164–446)
PMV BLD AUTO: 9.1 FL (ref 9–12.9)
POTASSIUM SERPL-SCNC: 4.1 MMOL/L (ref 3.6–5.5)
RBC # BLD AUTO: 3.13 M/UL (ref 4.7–6.1)
SODIUM SERPL-SCNC: 134 MMOL/L (ref 135–145)
WBC # BLD AUTO: 9.2 K/UL (ref 4.8–10.8)

## 2018-03-27 PROCEDURE — 700111 HCHG RX REV CODE 636 W/ 250 OVERRIDE (IP): Performed by: SURGERY

## 2018-03-27 PROCEDURE — A9270 NON-COVERED ITEM OR SERVICE: HCPCS | Performed by: SURGERY

## 2018-03-27 PROCEDURE — 302131 K PAD MOTOR: Performed by: SURGERY

## 2018-03-27 PROCEDURE — 80048 BASIC METABOLIC PNL TOTAL CA: CPT

## 2018-03-27 PROCEDURE — 97535 SELF CARE MNGMENT TRAINING: CPT

## 2018-03-27 PROCEDURE — 700111 HCHG RX REV CODE 636 W/ 250 OVERRIDE (IP): Performed by: ORTHOPAEDIC SURGERY

## 2018-03-27 PROCEDURE — 85025 COMPLETE CBC W/AUTO DIFF WBC: CPT

## 2018-03-27 PROCEDURE — 700111 HCHG RX REV CODE 636 W/ 250 OVERRIDE (IP): Performed by: NURSE PRACTITIONER

## 2018-03-27 PROCEDURE — G8988 SELF CARE GOAL STATUS: HCPCS | Mod: CI

## 2018-03-27 PROCEDURE — 36415 COLL VENOUS BLD VENIPUNCTURE: CPT

## 2018-03-27 PROCEDURE — G8987 SELF CARE CURRENT STATUS: HCPCS | Mod: CJ

## 2018-03-27 PROCEDURE — 302151 K-PAD 3X20: Performed by: SURGERY

## 2018-03-27 PROCEDURE — 700102 HCHG RX REV CODE 250 W/ 637 OVERRIDE(OP): Performed by: SURGERY

## 2018-03-27 PROCEDURE — A9270 NON-COVERED ITEM OR SERVICE: HCPCS | Performed by: PHYSICIAN ASSISTANT

## 2018-03-27 PROCEDURE — 700102 HCHG RX REV CODE 250 W/ 637 OVERRIDE(OP): Performed by: PHYSICIAN ASSISTANT

## 2018-03-27 PROCEDURE — A9270 NON-COVERED ITEM OR SERVICE: HCPCS | Performed by: NURSE PRACTITIONER

## 2018-03-27 PROCEDURE — 700102 HCHG RX REV CODE 250 W/ 637 OVERRIDE(OP): Performed by: NURSE PRACTITIONER

## 2018-03-27 PROCEDURE — 770006 HCHG ROOM/CARE - MED/SURG/GYN SEMI*

## 2018-03-27 RX ADMIN — OXYCODONE HYDROCHLORIDE 5 MG: 5 TABLET ORAL at 01:08

## 2018-03-27 RX ADMIN — ASPIRIN 81 MG: 81 TABLET, CHEWABLE ORAL at 09:15

## 2018-03-27 RX ADMIN — MORPHINE SULFATE 2 MG: 4 INJECTION INTRAVENOUS at 11:11

## 2018-03-27 RX ADMIN — ENOXAPARIN SODIUM 30 MG: 100 INJECTION SUBCUTANEOUS at 09:16

## 2018-03-27 RX ADMIN — OXYCODONE HYDROCHLORIDE 15 MG: 10 TABLET ORAL at 18:26

## 2018-03-27 RX ADMIN — ACETAMINOPHEN 650 MG: 325 TABLET, FILM COATED ORAL at 04:57

## 2018-03-27 RX ADMIN — OXYCODONE HYDROCHLORIDE 15 MG: 10 TABLET ORAL at 22:30

## 2018-03-27 RX ADMIN — OXYCODONE HYDROCHLORIDE 15 MG: 10 TABLET ORAL at 13:31

## 2018-03-27 RX ADMIN — CEFAZOLIN SODIUM 2 G: 2 INJECTION, SOLUTION INTRAVENOUS at 22:29

## 2018-03-27 RX ADMIN — CEFAZOLIN SODIUM 2 G: 2 INJECTION, SOLUTION INTRAVENOUS at 13:31

## 2018-03-27 RX ADMIN — GABAPENTIN 100 MG: 100 CAPSULE ORAL at 16:03

## 2018-03-27 RX ADMIN — ACETAMINOPHEN 650 MG: 325 TABLET, FILM COATED ORAL at 00:15

## 2018-03-27 RX ADMIN — GABAPENTIN 100 MG: 100 CAPSULE ORAL at 09:16

## 2018-03-27 RX ADMIN — OXYCODONE HYDROCHLORIDE 10 MG: 10 TABLET ORAL at 04:58

## 2018-03-27 RX ADMIN — ACETAMINOPHEN 650 MG: 325 TABLET, FILM COATED ORAL at 15:10

## 2018-03-27 RX ADMIN — OXYCODONE HYDROCHLORIDE 5 MG: 5 TABLET ORAL at 09:16

## 2018-03-27 RX ADMIN — OXYCODONE HYDROCHLORIDE 5 MG: 5 TABLET ORAL at 00:15

## 2018-03-27 RX ADMIN — CEFAZOLIN SODIUM 2 G: 2 INJECTION, SOLUTION INTRAVENOUS at 04:58

## 2018-03-27 ASSESSMENT — PAIN SCALES - GENERAL
PAINLEVEL_OUTOF10: 4
PAINLEVEL_OUTOF10: 5
PAINLEVEL_OUTOF10: 6
PAINLEVEL_OUTOF10: 9
PAINLEVEL_OUTOF10: 3

## 2018-03-27 ASSESSMENT — COGNITIVE AND FUNCTIONAL STATUS - GENERAL
DRESSING REGULAR LOWER BODY CLOTHING: A LOT
HELP NEEDED FOR BATHING: A LITTLE
SUGGESTED CMS G CODE MODIFIER DAILY ACTIVITY: CJ
DAILY ACTIVITIY SCORE: 20
TOILETING: A LITTLE

## 2018-03-27 ASSESSMENT — ENCOUNTER SYMPTOMS
SENSORY CHANGE: 1
ABDOMINAL PAIN: 0
DOUBLE VISION: 0
NAUSEA: 0
CHILLS: 0
VOMITING: 0
FEVER: 0
COUGH: 0
MYALGIAS: 1
HEADACHES: 0
BLURRED VISION: 0

## 2018-03-27 NOTE — THERAPY
"Occupational Therapy Treatment completed with focus on ADLs and ADL transfers.  Functional Status:  Pt was agreeable to tx this Am. Pt reported pain in LLE, but was willing to get out of bed. Pt was Spv for mobility to EOB, CGA for sit-to-stand transfer. Pt CGA, ambulating 100' with FWW. Pt required rest breaks in hallway, reporting prior heart attack last year as reason for poor endurance. Pt reports he lives alone in a H and cannot attend to needs at home in current state. Pt Spv mobility back into bed. Pt will require post acute rehab to increase mobility and endurance before home d/c.   Plan of Care: Will benefit from Occupational Therapy 3 times per week  Discharge Recommendations:  Equipment Will Continue to Assess for Equipment Needs. Post-acute therapy Discharge to a transitional care facility for continued skilled therapy services.    See \"Rehab Therapy-Acute\" Patient Summary Report for complete documentation.   "

## 2018-03-27 NOTE — CARE PLAN
Problem: Communication  Goal: The ability to communicate needs accurately and effectively will improve  Outcome: PROGRESSING AS EXPECTED  Pt demonstrates ability to make needs known, uses call light appropriately.    Problem: Pain Management  Goal: Pain level will decrease to patient's comfort goal  Outcome: PROGRESSING AS EXPECTED  Medicated per MAR

## 2018-03-27 NOTE — PROGRESS NOTES
Received report from day RN. Pt alert and oriented VSS on 1L O2. Wound vac on LLE, dressing c/d/i, NWB, assist  OOB x1.  Medicated for pain with 10 mg oxy po. Call light within reach, call light within reach, hourly rounding in place, no further needs at this time.

## 2018-03-27 NOTE — DISCHARGE PLANNING
TCN met with patient at bedside to discuss his transitional care options. Patient would like to DC to SNF as he feels he is not capable caring for himself at this time. Patient does not have medical insurance and is being screened by [facility] per . Patient agreeable to choice being faxed to all local facilities. Choice faxed to [facility]. TCN to follow as needed.

## 2018-03-27 NOTE — DISCHARGE PLANNING
Anticipated DC Disposition: SNF vs.     Action: SNF referrals have been sent.   LSW completed PASRR, #: [id number]     Barriers to Discharge: No insurance    Plan: Await potential SNF acceptance
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
## 2018-03-27 NOTE — PROGRESS NOTES
"S:  Seen and examined.   S/p open Lisfranc I&D, closure.  Doing well this morning.  No new complaints, pain controlled.    O: Blood pressure 121/75, pulse 88, temperature 37 °C (98.6 °F), resp. rate 15, height 1.854 m (6' 1\"), weight 88.5 kg (195 lb), SpO2 96 %..    Intake/Output Summary (Last 24 hours) at 03/27/18 0858  Last data filed at 03/27/18 0700   Gross per 24 hour   Intake              720 ml   Output             1775 ml   Net            -1055 ml   .    Operative/injured extremity examined.  Toes warm, well-perfused.  Wound vac to suction, splint c/d/i.    Recent Labs      03/25/18   0303  03/26/18   0138  03/27/18   0202   WBC  6.8  8.7  9.2   RBC  2.78*  2.98*  3.13*   HEMOGLOBIN  8.6*  9.4*  9.4*   HEMATOCRIT  25.8*  27.2*  28.2*   MCV  92.8  91.3  90.1   MCH  30.9  31.5  30.0   MCHC  33.3*  34.6  33.3*   RDW  44.3  42.7  41.9   PLATELETCT  156*  185  224   MPV  9.5  9.7  9.1       A/P:    POD #3 s/p L foot wound closure for open Lisfranc    Antibiotics: None required  Activity: NWB operative extremity.  PT today.  Diet: General. NPO after midnight today for OR tomorrow  DVT: Mechanical (SCDs) + Pharmacologic (None required for ortho, cleared for any deemed needed by trauma)  Dispo: D/C planning.  Dressing change in OR tomorrow then OK to d/c    "

## 2018-03-27 NOTE — CARE PLAN
Problem: Infection  Goal: Will remain free from infection    Intervention: Assess signs and symptoms of infection  VSS, labs reviewed. ABX administered per MAR. Patient to be NPO at midnight for another I&D tomorrow.      Problem: Discharge Barriers/Planning  Goal: Patient's continuum of care needs will be met    Intervention: Assess potential discharge barriers on admission and throughout hospital stay  Patient pending I&D tomorrow and still needs definitive fixation per PA notes.

## 2018-03-28 PROCEDURE — 700112 HCHG RX REV CODE 229: Performed by: SURGERY

## 2018-03-28 PROCEDURE — 700102 HCHG RX REV CODE 250 W/ 637 OVERRIDE(OP): Performed by: NURSE PRACTITIONER

## 2018-03-28 PROCEDURE — 770006 HCHG ROOM/CARE - MED/SURG/GYN SEMI*

## 2018-03-28 PROCEDURE — 700111 HCHG RX REV CODE 636 W/ 250 OVERRIDE (IP): Performed by: ORTHOPAEDIC SURGERY

## 2018-03-28 PROCEDURE — 160038 HCHG SURGERY MINUTES - EA ADDL 1 MIN LEVEL 2: Performed by: ORTHOPAEDIC SURGERY

## 2018-03-28 PROCEDURE — 700111 HCHG RX REV CODE 636 W/ 250 OVERRIDE (IP)

## 2018-03-28 PROCEDURE — 700102 HCHG RX REV CODE 250 W/ 637 OVERRIDE(OP): Performed by: SURGERY

## 2018-03-28 PROCEDURE — 160009 HCHG ANES TIME/MIN: Performed by: ORTHOPAEDIC SURGERY

## 2018-03-28 PROCEDURE — 160002 HCHG RECOVERY MINUTES (STAT): Performed by: ORTHOPAEDIC SURGERY

## 2018-03-28 PROCEDURE — A9270 NON-COVERED ITEM OR SERVICE: HCPCS | Performed by: PHYSICIAN ASSISTANT

## 2018-03-28 PROCEDURE — 700102 HCHG RX REV CODE 250 W/ 637 OVERRIDE(OP): Performed by: PHYSICIAN ASSISTANT

## 2018-03-28 PROCEDURE — A9270 NON-COVERED ITEM OR SERVICE: HCPCS | Performed by: NURSE PRACTITIONER

## 2018-03-28 PROCEDURE — A9270 NON-COVERED ITEM OR SERVICE: HCPCS | Performed by: SURGERY

## 2018-03-28 PROCEDURE — 2W5TX6Z REMOVAL OF PRESSURE DRESSING ON LEFT FOOT: ICD-10-PCS | Performed by: ORTHOPAEDIC SURGERY

## 2018-03-28 PROCEDURE — 700111 HCHG RX REV CODE 636 W/ 250 OVERRIDE (IP): Performed by: NURSE PRACTITIONER

## 2018-03-28 PROCEDURE — 160048 HCHG OR STATISTICAL LEVEL 1-5: Performed by: ORTHOPAEDIC SURGERY

## 2018-03-28 PROCEDURE — 160027 HCHG SURGERY MINUTES - 1ST 30 MINS LEVEL 2: Performed by: ORTHOPAEDIC SURGERY

## 2018-03-28 PROCEDURE — 160035 HCHG PACU - 1ST 60 MINS PHASE I: Performed by: ORTHOPAEDIC SURGERY

## 2018-03-28 PROCEDURE — 2W3TX1Z IMMOBILIZATION OF LEFT FOOT USING SPLINT: ICD-10-PCS | Performed by: ORTHOPAEDIC SURGERY

## 2018-03-28 RX ORDER — MIDAZOLAM HYDROCHLORIDE 1 MG/ML
INJECTION INTRAMUSCULAR; INTRAVENOUS
Status: DISPENSED
Start: 2018-03-28 | End: 2018-03-28

## 2018-03-28 RX ADMIN — MAGNESIUM HYDROXIDE 30 ML: 400 SUSPENSION ORAL at 10:21

## 2018-03-28 RX ADMIN — GABAPENTIN 100 MG: 100 CAPSULE ORAL at 10:16

## 2018-03-28 RX ADMIN — DOCUSATE SODIUM 100 MG: 100 CAPSULE ORAL at 10:16

## 2018-03-28 RX ADMIN — OXYCODONE HYDROCHLORIDE 5 MG: 5 TABLET ORAL at 15:14

## 2018-03-28 RX ADMIN — OXYCODONE HYDROCHLORIDE 15 MG: 10 TABLET ORAL at 03:23

## 2018-03-28 RX ADMIN — POLYETHYLENE GLYCOL 3350 1 PACKET: 17 POWDER, FOR SOLUTION ORAL at 10:21

## 2018-03-28 RX ADMIN — ASPIRIN 81 MG: 81 TABLET, CHEWABLE ORAL at 10:16

## 2018-03-28 RX ADMIN — CEFAZOLIN SODIUM 2 G: 2 INJECTION, SOLUTION INTRAVENOUS at 05:49

## 2018-03-28 RX ADMIN — ENOXAPARIN SODIUM 30 MG: 100 INJECTION SUBCUTANEOUS at 10:15

## 2018-03-28 RX ADMIN — GABAPENTIN 100 MG: 100 CAPSULE ORAL at 15:14

## 2018-03-28 RX ADMIN — OXYCODONE HYDROCHLORIDE 15 MG: 10 TABLET ORAL at 10:16

## 2018-03-28 RX ADMIN — OXYCODONE HYDROCHLORIDE 5 MG: 5 TABLET ORAL at 19:39

## 2018-03-28 ASSESSMENT — ENCOUNTER SYMPTOMS
MYALGIAS: 1
DOUBLE VISION: 0
BLURRED VISION: 0
SPEECH CHANGE: 0
FEVER: 0
VOMITING: 0
HEADACHES: 0
COUGH: 0
NAUSEA: 0
CHILLS: 0
ABDOMINAL PAIN: 0

## 2018-03-28 ASSESSMENT — PAIN SCALES - GENERAL
PAINLEVEL_OUTOF10: 2
PAINLEVEL_OUTOF10: 6
PAINLEVEL_OUTOF10: 0
PAINLEVEL_OUTOF10: 3
PAINLEVEL_OUTOF10: 5
PAINLEVEL_OUTOF10: 2

## 2018-03-28 NOTE — PROGRESS NOTES
"S:  Seen and examined.   S/p open Lisfranc I&D, closure.  Doing well this morning.  No new complaints, pain controlled.    O: Blood pressure 108/72, pulse 88, temperature 37.1 °C (98.7 °F), resp. rate 19, height 1.854 m (6' 1\"), weight 92.2 kg (203 lb 4.2 oz), SpO2 94 %..      Intake/Output Summary (Last 24 hours) at 03/28/18 0968  Last data filed at 03/28/18 0912   Gross per 24 hour   Intake              760 ml   Output             1530 ml   Net             -770 ml   .    Operative/injured extremity examined.  Toes warm, well-perfused.  Wound vac to suction, splint c/d/i.    Recent Labs      03/26/18   0138  03/27/18   0202   WBC  8.7  9.2   RBC  2.98*  3.13*   HEMOGLOBIN  9.4*  9.4*   HEMATOCRIT  27.2*  28.2*   MCV  91.3  90.1   MCH  31.5  30.0   MCHC  34.6  33.3*   RDW  42.7  41.9   PLATELETCT  185  224   MPV  9.7  9.1       A/P:    POD #4 s/p L foot wound closure for open Lisfranc    Antibiotics: None required  Activity: NWB operative extremity.  PT today.  Diet: NPO currently  DVT: Mechanical (SCDs) + Pharmacologic (No pharmacologic at this time given wound risk)  Dispo: D/C planning.  Dressing change in OR today.    ADDENDUM:  - Notable foot maceration and dorsal skin bruising/congestion at dressing change.  Dorsal and lateral foot blistering.  Wound vac removed, splint re-applied  - Splint change on floor in two days, or tomorrow if saturating splint  "

## 2018-03-28 NOTE — CARE PLAN
Problem: Safety  Goal: Will remain free from falls    Intervention: Implement fall precautions  Bed is in the lowest position, call light and belongings are within reach, treaded socks are on, DME is in the bathroom, rails are up, and hourly rounding is in place.       Problem: Pain Management  Goal: Pain level will decrease to patient's comfort goal    Intervention: Educate and implement non-pharmacologic comfort measures. Examples: relaxation, distration, play therapy, activity therapy, massage, etc.  Pt complains of pain to th Judy. PRN oxycodone given, polar ice is in place, and foot is elevated.

## 2018-03-28 NOTE — PROGRESS NOTES
Pt arrived back to unit, VSS. Dressing to left foot clean, dry, and intact. Wound vac removed in OR. Pt reports some numbness to left foot. Left toes are cool to touch. Pain medicated per MAR. Will continue to monitor.

## 2018-03-28 NOTE — OP REPORT
DATE OF SERVICE:  03/28/2018    PREOPERATIVE DIAGNOSIS:  Left open Lisfranc fracture dislocation status post   multiple incision and drainage and closure.    POSTOPERATIVE DIAGNOSIS:  Left open Lisfranc fracture dislocation status post   multiple incision and drainage and closure.    PROCEDURES:  1.  Exam under anesthesia, left lower extremity.  2.  Removal of wound VAC, left lower extremity.  3.  Short leg splint application, left lower extremity.    SURGEON:  Eric Aguayo MD    ASSISTANT:  None.    ANESTHESIOLOGIST:  Abraham Roland MD    ANESTHESIA:  General.    SPECIMEN:  None.    ESTIMATED BLOOD LOSS:  None.    COMPLICATIONS:  None.    OPERATIVE INDICATIONS:  This gentleman had a pleasant 62-year-old male who   sustained an open left foot Lisfranc fracture dislocation subsequent I and D   and percutaneous pinning by me and then medial wound closure by Dr. Alegre 2   days later.  He has done well with his pain control.  He returns today for   planned return to the operating room for wound VAC removal and I and D if   indicated.  Discussed the risks, benefits, and alternatives including the   risks and benefits of infection, wound healing complication, neurovascular   injury, blood loss, medical risk of anesthesia.  We discussed alternatives   including a splint change in the floor and benefits including pain relief   during dressing change.  He agreed to plan to proceed.  Informed consent was   signed and documented.  I then preoperatively marked the operative extremity   with his agreement.    OPERATIVE COURSE:  He underwent general anesthesia with Dr. Roland and was   positioned supine.  All bony prominences were well padded.  A procedural pause   was conducted to verify correct patient, correct extremity, presence of the   surgeon's initials on the operative extremity. Following generalized   agreement, the left lower extremity splint was removed and the wound VAC was   removed.  A large amount of  posterior heel skin and medial hindfoot, midfoot   and forefoot skin was macerated. From the apex of the wound VAC, the suture   line remained intact.  Some ischemic changes were noted to the skin on the   dorsum of foot as well as some bruising; however, there was no skin tension in   this area.  Some blistering was noted particularly more on the midfoot and   hindfoot, both dorsally, medially and laterally.  All pin sites were remained   clean.  All pins were noted to remain well fixed.    IMPRESSION:  Given these findings, I elected not to use any I and D.  His   wound is well approximated and while there does appear to be a little bit of   seromatous, appeared to be a bit of threatened skin dorsally.  There is not   undue tension on the wound and I did not know that there is any further   surgery be of benefit that may worsen the situation.  As such, we were   answered.  We elected not to place a wound VAC because of the maceration.    Sterile dressings were applied.  Xeroform was placed over all blister sites   and then the short leg splint was applied after padding all pin sites and   placing all dressings.  Once complete, the patient was transferred  to the   recovery room in stable condition with no complications.  The scope was placed   in maximal dorsiflexion and was placed in a splint.     POSTOPERATIVE PLAN:  1.  Nonweightbearing operative extremity, elevate to reduce edema.  2.  DVT prophylaxis with SCDs.  Hold Lovenox if this is being administered   given his wound condition.  3.  IV antibiotics - none.  4.  Splint change in the floor in 2 days for a skin check.       ____________________________________     MD NEGIN Beltran / [facility]    DD:  03/28/2018 09:37:10  DT:  03/28/2018 10:30:38    D#:  [id number]  Job#:  [id number]
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
## 2018-03-29 LAB
MAGNESIUM SERPL-MCNC: 2.3 MG/DL (ref 1.5–2.5)
PHOSPHATE SERPL-MCNC: 2.7 MG/DL (ref 2.5–4.5)

## 2018-03-29 PROCEDURE — A9270 NON-COVERED ITEM OR SERVICE: HCPCS | Performed by: NURSE PRACTITIONER

## 2018-03-29 PROCEDURE — 97530 THERAPEUTIC ACTIVITIES: CPT

## 2018-03-29 PROCEDURE — 84100 ASSAY OF PHOSPHORUS: CPT

## 2018-03-29 PROCEDURE — 36415 COLL VENOUS BLD VENIPUNCTURE: CPT

## 2018-03-29 PROCEDURE — 97116 GAIT TRAINING THERAPY: CPT

## 2018-03-29 PROCEDURE — 83735 ASSAY OF MAGNESIUM: CPT

## 2018-03-29 PROCEDURE — A9270 NON-COVERED ITEM OR SERVICE: HCPCS | Performed by: SURGERY

## 2018-03-29 PROCEDURE — 770006 HCHG ROOM/CARE - MED/SURG/GYN SEMI*

## 2018-03-29 PROCEDURE — 700102 HCHG RX REV CODE 250 W/ 637 OVERRIDE(OP): Performed by: SURGERY

## 2018-03-29 PROCEDURE — 700102 HCHG RX REV CODE 250 W/ 637 OVERRIDE(OP): Performed by: PHYSICIAN ASSISTANT

## 2018-03-29 PROCEDURE — A9270 NON-COVERED ITEM OR SERVICE: HCPCS | Performed by: PHYSICIAN ASSISTANT

## 2018-03-29 PROCEDURE — 700102 HCHG RX REV CODE 250 W/ 637 OVERRIDE(OP): Performed by: NURSE PRACTITIONER

## 2018-03-29 RX ADMIN — OXYCODONE HYDROCHLORIDE 10 MG: 10 TABLET ORAL at 06:49

## 2018-03-29 RX ADMIN — MAGNESIUM HYDROXIDE 15 ML: 400 SUSPENSION ORAL at 09:05

## 2018-03-29 RX ADMIN — POLYETHYLENE GLYCOL 3350 0.5 PACKET: 17 POWDER, FOR SOLUTION ORAL at 09:05

## 2018-03-29 RX ADMIN — OXYCODONE HYDROCHLORIDE 10 MG: 10 TABLET ORAL at 12:17

## 2018-03-29 RX ADMIN — ASPIRIN 81 MG: 81 TABLET, CHEWABLE ORAL at 09:03

## 2018-03-29 RX ADMIN — OXYCODONE HYDROCHLORIDE 15 MG: 10 TABLET ORAL at 15:11

## 2018-03-29 RX ADMIN — OXYCODONE HYDROCHLORIDE 5 MG: 5 TABLET ORAL at 19:35

## 2018-03-29 RX ADMIN — OXYCODONE HYDROCHLORIDE 5 MG: 5 TABLET ORAL at 02:44

## 2018-03-29 RX ADMIN — ACETAMINOPHEN 650 MG: 325 TABLET, FILM COATED ORAL at 15:30

## 2018-03-29 ASSESSMENT — GAIT ASSESSMENTS
ASSISTIVE DEVICE: CRUTCHES;FRONT WHEEL WALKER
GAIT LEVEL OF ASSIST: CONTACT GUARD ASSIST
DEVIATION: ATAXIC

## 2018-03-29 ASSESSMENT — COGNITIVE AND FUNCTIONAL STATUS - GENERAL
WALKING IN HOSPITAL ROOM: A LITTLE
CLIMB 3 TO 5 STEPS WITH RAILING: A LOT
MOVING FROM LYING ON BACK TO SITTING ON SIDE OF FLAT BED: A LITTLE
MOBILITY SCORE: 19
SUGGESTED CMS G CODE MODIFIER MOBILITY: CK
STANDING UP FROM CHAIR USING ARMS: A LITTLE

## 2018-03-29 ASSESSMENT — PAIN SCALES - GENERAL
PAINLEVEL_OUTOF10: 6
PAINLEVEL_OUTOF10: 6
PAINLEVEL_OUTOF10: 4
PAINLEVEL_OUTOF10: 3
PAINLEVEL_OUTOF10: 6

## 2018-03-29 ASSESSMENT — ENCOUNTER SYMPTOMS
DOUBLE VISION: 0
SENSORY CHANGE: 1
ABDOMINAL PAIN: 0
VOMITING: 0
CHILLS: 0
SPEECH CHANGE: 0
MYALGIAS: 1
NAUSEA: 0
BLURRED VISION: 0
FEVER: 0
HEADACHES: 0
COUGH: 0

## 2018-03-29 NOTE — PROGRESS NOTES
Pt at rest in bed. Pain stated at 6/10 when prompted. Pharmacological intervention received. Standby x1 assist. No other complaints or concerns at this time. Call light within reach.

## 2018-03-29 NOTE — CARE PLAN
Problem: Safety  Goal: Will remain free from falls    Intervention: Implement fall precautions  Bed is in the lowest position, call light and belongings are within reach, treaded socks are on, DME is in the bathroom, rails are up, and hourly rounding is in place.       Problem: Pain Management  Goal: Pain level will decrease to patient's comfort goal    Intervention: Follow pain managment plan developed in collaboration with patient and Interdisciplinary Team  Pt complains of pain to the LLE that is well controlled with PRN oxycodone. Polar ice and elevation are also in use.

## 2018-03-29 NOTE — PROGRESS NOTES
"S:  Seen and examined.   S/p open Lisfranc I&D, closure.  Doing well this morning.  No new complaints, pain controlled.    O: Blood pressure 124/81, pulse 89, temperature 36.4 °C (97.5 °F), resp. rate 16, height 1.854 m (6' 1\"), weight 92.2 kg (203 lb 4.2 oz), SpO2 96 %..      Intake/Output Summary (Last 24 hours) at 03/29/18 1055  Last data filed at 03/28/18 1700   Gross per 24 hour   Intake              720 ml   Output              900 ml   Net             -180 ml   .    Operative/injured extremity examined.  Toes warm, well-perfused.  Splint c/d/i.    Recent Labs      03/27/18   0202   WBC  9.2   RBC  3.13*   HEMOGLOBIN  9.4*   HEMATOCRIT  28.2*   MCV  90.1   MCH  30.0   MCHC  33.3*   RDW  41.9   PLATELETCT  224   MPV  9.1       A/P:    #1 Left foot open Lisfranc s/p I&D, closure    Antibiotics: None required  Activity: NWB operative extremity.  PT today.  Diet: General  DVT: Mechanical (SCDs) + Pharmacologic (No pharmacologic at this time given wound risk)  Dispo: D/C planning.  Dressing change tomorrow.  If skin the same or better, OK to plan d/c.  If worsening, keep inpatient.    "

## 2018-03-29 NOTE — THERAPY
"Physical Therapy Treatment completed.   Bed Mobility:  Supine to Sit: Supervised  Transfers: Sit to Stand: Contact Guard Assist (CGA w/ crutches, SBA w/ FWW)  Gait: Level Of Assist: Contact Guard Assist (Close CGA w/ crutches) with Front-Wheel Walker and Crutches       Plan of Care: Will benefit from Physical Therapy 3 times per week  Discharge Recommendations: Equipment: Will Continue to Assess for Equipment Needs.     See \"Rehab Therapy-Acute\" Patient Summary Report for complete documentation.     Pt presenting w/ improved functional mobility. He is able to perform bed mobility w/out assistance. He does show improved stability w/ the FWW however pt has 3 steps w/ no railing to enter so crutch training attempted. Pt displaying very impulsive behaviors w/ the crutches and had multiple LOB w/ pt able to self correct. Pt will require continued crutch training w/ the stairs prior to DC home however pt does not display safe behavior that he would be safe to perform w/out assistance at home. Will continue to follow to address functional limitations but at this time, the pt will require post acute therapy.  "

## 2018-03-29 NOTE — PROGRESS NOTES
Pt at rest in bed. Pain stated at 6/10 half an hour after pharmacological intervention received. Standby x1 assist. No other complaints or concerns at this time. Call light within reach.

## 2018-03-29 NOTE — DISCHARGE PLANNING
Anticipated DC Disposition: SNF    Action: LSW emailed  [employer] to request [facility] for SNF placement.     UPDATE 1:40 - Received approval for [facility]. CCS Brittany following up with SNFs as referrals have already been sent.     Barriers to Discharge: No insurance - barrier overcome by [facility]    Plan: Await [facility] approval and SNF acceptance.     UPDATE 1:40 - await SNF acceptance.

## 2018-03-29 NOTE — CARE PLAN
Problem: Communication  Goal: The ability to communicate needs accurately and effectively will improve    Intervention: Educate patient and significant other/support system about the plan of care, procedures, treatments, medications and allow for questions  POC discussed with pt including medications, call light, and unit routine.       Problem: Pain Management  Goal: Pain level will decrease to patient's comfort goal    Intervention: Follow pain managment plan developed in collaboration with patient and Interdisciplinary Team  Pain well managed per MAR.
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
## 2018-03-29 NOTE — DISCHARGE PLANNING
Received call from Alexandro at [facility], referral has been declined- unable to accommodate MINDY at this time.

## 2018-03-30 ENCOUNTER — APPOINTMENT (OUTPATIENT)
Dept: RADIOLOGY | Facility: MEDICAL CENTER | Age: 63
DRG: 475 | End: 2018-03-30
Attending: PHYSICIAN ASSISTANT
Payer: COMMERCIAL

## 2018-03-30 PROCEDURE — 770006 HCHG ROOM/CARE - MED/SURG/GYN SEMI*

## 2018-03-30 PROCEDURE — 700102 HCHG RX REV CODE 250 W/ 637 OVERRIDE(OP): Performed by: SURGERY

## 2018-03-30 PROCEDURE — A9270 NON-COVERED ITEM OR SERVICE: HCPCS | Performed by: SURGERY

## 2018-03-30 PROCEDURE — 700102 HCHG RX REV CODE 250 W/ 637 OVERRIDE(OP): Performed by: NURSE PRACTITIONER

## 2018-03-30 PROCEDURE — 73620 X-RAY EXAM OF FOOT: CPT | Mod: LT

## 2018-03-30 PROCEDURE — A9270 NON-COVERED ITEM OR SERVICE: HCPCS | Performed by: PHYSICIAN ASSISTANT

## 2018-03-30 PROCEDURE — 700112 HCHG RX REV CODE 229: Performed by: SURGERY

## 2018-03-30 PROCEDURE — 2W0MX1Z CHANGE SPLINT ON LEFT LOWER EXTREMITY: ICD-10-PCS | Performed by: ORTHOPAEDIC SURGERY

## 2018-03-30 PROCEDURE — A9270 NON-COVERED ITEM OR SERVICE: HCPCS | Performed by: NURSE PRACTITIONER

## 2018-03-30 PROCEDURE — 700102 HCHG RX REV CODE 250 W/ 637 OVERRIDE(OP): Performed by: PHYSICIAN ASSISTANT

## 2018-03-30 RX ADMIN — OXYCODONE HYDROCHLORIDE 10 MG: 10 TABLET ORAL at 08:24

## 2018-03-30 RX ADMIN — ASPIRIN 81 MG: 81 TABLET, CHEWABLE ORAL at 08:24

## 2018-03-30 RX ADMIN — OXYCODONE HYDROCHLORIDE 15 MG: 10 TABLET ORAL at 11:27

## 2018-03-30 RX ADMIN — OXYCODONE HYDROCHLORIDE 10 MG: 10 TABLET ORAL at 16:06

## 2018-03-30 RX ADMIN — OXYCODONE HYDROCHLORIDE 10 MG: 10 TABLET ORAL at 19:38

## 2018-03-30 RX ADMIN — POLYETHYLENE GLYCOL 3350 1 PACKET: 17 POWDER, FOR SOLUTION ORAL at 19:39

## 2018-03-30 RX ADMIN — DOCUSATE SODIUM 100 MG: 100 CAPSULE ORAL at 19:39

## 2018-03-30 RX ADMIN — OXYCODONE HYDROCHLORIDE 10 MG: 10 TABLET ORAL at 23:01

## 2018-03-30 RX ADMIN — OXYCODONE HYDROCHLORIDE 10 MG: 10 TABLET ORAL at 03:14

## 2018-03-30 RX ADMIN — STANDARDIZED SENNA CONCENTRATE AND DOCUSATE SODIUM 1 TABLET: 8.6; 5 TABLET, FILM COATED ORAL at 19:39

## 2018-03-30 RX ADMIN — DOCUSATE SODIUM 100 MG: 100 CAPSULE ORAL at 08:25

## 2018-03-30 ASSESSMENT — PAIN SCALES - GENERAL
PAINLEVEL_OUTOF10: 2
PAINLEVEL_OUTOF10: 6
PAINLEVEL_OUTOF10: 6
PAINLEVEL_OUTOF10: 2
PAINLEVEL_OUTOF10: 4
PAINLEVEL_OUTOF10: 2
PAINLEVEL_OUTOF10: 7
PAINLEVEL_OUTOF10: 2
PAINLEVEL_OUTOF10: 6
PAINLEVEL_OUTOF10: 6

## 2018-03-30 ASSESSMENT — ENCOUNTER SYMPTOMS
GASTROINTESTINAL NEGATIVE: 1
SENSORY CHANGE: 1
RESPIRATORY NEGATIVE: 1
ROS GI COMMENTS: BM 3/29
NERVOUS/ANXIOUS: 1
CONSTITUTIONAL NEGATIVE: 1

## 2018-03-30 NOTE — CARE PLAN
Problem: Infection  Goal: Will remain free from infection    Intervention: Assess signs and symptoms of infection  No S/S of infection at this time. Pt is afebrile, WBC's are WNL, MEWS is WNL.       Problem: Venous Thromboembolism (VTW)/Deep Vein Thrombosis (DVT) Prevention:  Goal: Patient will participate in Venous Thrombosis (VTE)/Deep Vein Thrombosis (DVT)Prevention Measures    Intervention: Ensure patient wears graduated elastic stockings (YINKA hose) and/or SCDs, if ordered, when in bed or chair (Remove at least once per shift for skin check)  SCD's are in place, and Pt does ambulate and flex ankles. ASA for VTE prophylaxis.

## 2018-03-30 NOTE — PROGRESS NOTES
"S:  Seen and examined.   S/p open Lisfranc I&D, closure.  Doing well this morning.  No new complaints, pain controlled.    O: Blood pressure 139/77, pulse 87, temperature 37.7 °C (99.9 °F), resp. rate 18, height 1.854 m (6' 1\"), weight 92.2 kg (203 lb 4.2 oz), SpO2 97 %..      Intake/Output Summary (Last 24 hours) at 03/30/18 1652  Last data filed at 03/30/18 1606   Gross per 24 hour   Intake              240 ml   Output              600 ml   Net             -360 ml   .    Operative/injured extremity examined.  Toes warm, well-perfused.  Splint c/d/i.          A/P:    #1 Left foot open Lisfranc s/p I&D, closure    Antibiotics: None required  Activity: NWB operative extremity.  PT today.  Diet: General  DVT: Mechanical (SCDs) + Pharmacologic (No pharmacologic at this time given wound risk)  Dispo: D/C planning.  Dressing change today.  If skin the same or better, OK to plan d/c.  If worsening, keep inpatient.    "
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
## 2018-03-30 NOTE — DISCHARGE PLANNING
Anticipated DC Disposition: SNF    Action: Per [facility]any, All local SNFs have denied. LSW emailed  and  leadership for guidance on what step to take next.     Barriers to Discharge: No accepting SNF    Plan: Await guidance from leadership. Potentially expand referral.

## 2018-03-31 PROCEDURE — A9270 NON-COVERED ITEM OR SERVICE: HCPCS | Performed by: SURGERY

## 2018-03-31 PROCEDURE — 700102 HCHG RX REV CODE 250 W/ 637 OVERRIDE(OP): Performed by: PHYSICIAN ASSISTANT

## 2018-03-31 PROCEDURE — 700102 HCHG RX REV CODE 250 W/ 637 OVERRIDE(OP): Performed by: SURGERY

## 2018-03-31 PROCEDURE — 770006 HCHG ROOM/CARE - MED/SURG/GYN SEMI*

## 2018-03-31 PROCEDURE — A9270 NON-COVERED ITEM OR SERVICE: HCPCS | Performed by: PHYSICIAN ASSISTANT

## 2018-03-31 PROCEDURE — 700112 HCHG RX REV CODE 229: Performed by: SURGERY

## 2018-03-31 PROCEDURE — 700102 HCHG RX REV CODE 250 W/ 637 OVERRIDE(OP): Performed by: NURSE PRACTITIONER

## 2018-03-31 PROCEDURE — A9270 NON-COVERED ITEM OR SERVICE: HCPCS | Performed by: NURSE PRACTITIONER

## 2018-03-31 RX ADMIN — OXYCODONE HYDROCHLORIDE 5 MG: 5 TABLET ORAL at 15:30

## 2018-03-31 RX ADMIN — OXYCODONE HYDROCHLORIDE 10 MG: 10 TABLET ORAL at 15:25

## 2018-03-31 RX ADMIN — GABAPENTIN 100 MG: 100 CAPSULE ORAL at 09:47

## 2018-03-31 RX ADMIN — OXYCODONE HYDROCHLORIDE 15 MG: 10 TABLET ORAL at 19:40

## 2018-03-31 RX ADMIN — STANDARDIZED SENNA CONCENTRATE AND DOCUSATE SODIUM 1 TABLET: 8.6; 5 TABLET, FILM COATED ORAL at 19:37

## 2018-03-31 RX ADMIN — POLYETHYLENE GLYCOL 3350 1 PACKET: 17 POWDER, FOR SOLUTION ORAL at 09:47

## 2018-03-31 RX ADMIN — DOCUSATE SODIUM 100 MG: 100 CAPSULE ORAL at 19:37

## 2018-03-31 RX ADMIN — POLYETHYLENE GLYCOL 3350 1 PACKET: 17 POWDER, FOR SOLUTION ORAL at 19:37

## 2018-03-31 RX ADMIN — MAGNESIUM HYDROXIDE 30 ML: 400 SUSPENSION ORAL at 09:47

## 2018-03-31 RX ADMIN — DOCUSATE SODIUM 100 MG: 100 CAPSULE ORAL at 09:47

## 2018-03-31 RX ADMIN — OXYCODONE HYDROCHLORIDE 10 MG: 10 TABLET ORAL at 05:25

## 2018-03-31 RX ADMIN — OXYCODONE HYDROCHLORIDE 15 MG: 10 TABLET ORAL at 22:48

## 2018-03-31 RX ADMIN — OXYCODONE HYDROCHLORIDE 10 MG: 10 TABLET ORAL at 02:23

## 2018-03-31 RX ADMIN — OXYCODONE HYDROCHLORIDE 15 MG: 10 TABLET ORAL at 09:50

## 2018-03-31 RX ADMIN — GABAPENTIN 100 MG: 100 CAPSULE ORAL at 15:25

## 2018-03-31 RX ADMIN — ASPIRIN 81 MG: 81 TABLET, CHEWABLE ORAL at 09:47

## 2018-03-31 ASSESSMENT — ENCOUNTER SYMPTOMS
GASTROINTESTINAL NEGATIVE: 1
ROS GI COMMENTS: BM 3/30
SENSORY CHANGE: 1
NERVOUS/ANXIOUS: 1
RESPIRATORY NEGATIVE: 1
CONSTITUTIONAL NEGATIVE: 1

## 2018-03-31 ASSESSMENT — PAIN SCALES - GENERAL
PAINLEVEL_OUTOF10: 6
PAINLEVEL_OUTOF10: 7
PAINLEVEL_OUTOF10: 6
PAINLEVEL_OUTOF10: 4
PAINLEVEL_OUTOF10: 6
PAINLEVEL_OUTOF10: 7
PAINLEVEL_OUTOF10: 4
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
## 2018-03-31 NOTE — PROGRESS NOTES
Splint change yesterday  No new complaints    Vitals:    03/30/18 1600 03/30/18 1847 03/31/18 0215 03/31/18 0800   BP: 139/77 142/82 128/79 119/73   Pulse: 87 90 80 88   Resp: 18 18 16 16   Temp: 37.7 °C (99.9 °F) 37.2 °C (98.9 °F) 36.4 °C (97.6 °F) 36.4 °C (97.5 °F)   SpO2: 97% 96% 96% 98%   Weight:       Height:         LLE:  Splint c/d/i  F/e toes  Foot w/w/p    S/p L open Lisfranc I&D, provisional fixation    - NWB LLE in splint, ice & elevate.  - Rosemarie, PT/OT  - ASA 81 mg daily  - Dispo planning, keep in-house for continued elevation & wound monitoring

## 2018-03-31 NOTE — PROGRESS NOTES
Ortho Shift Summary: [id number]  Orientation- Alert and oriented x4  Incision- Left foot-splint changed by PA  Lines/Drains- PIV  Telemetry- NA  Pain- Roxicodone  Diet-  Regular  Discharge Plan- SNF    Concerns-  NPO at midnight- possible surgery tomorrow

## 2018-03-31 NOTE — PROCEDURES
"Patient seen per request of Dr Aguayo for wound exam and re-splinting of left foot and ankle.   The indications, risks, benefits, and alternatives of splint application were presented to the patient.      The old PLS with stirrups was carefully removed and all dressings were taken down. Photographs were obtained to share with the surgery team. Blistering was evident, mainly on the medial aspect or the foot and the plantar aspect of the forefoot. Pin sites appeared healthy. Blistering and lack of sensation were also noted on the great toe, the second toe, and the FDWS. All toes DF/PF.    The foot ankle and leg were dressed with adaptic and then wrapped with soft roll. A 4\" mediglass PLS splint was applied followed by a glass 3\" sugar-tong splint in stirrups fashion and held in place using a combination of bias and ace wraps. Care was taken to avoid pressure on the forefoot  There were no neuro complications , nor improvement, noted after splinting. The patient reported good fit and comfort of splint after application.  Tops of toes were left exposed to allow for rapid assessment.    "

## 2018-03-31 NOTE — PROGRESS NOTES
Shift report received from night RN, assumed care at 0715. Patient up in bed with no pain present, routinely medicated prn per MAR. AOx4, up with assist, NWB LLE, calls appropriately, bed alarm not in use. PIV assessed CDI. Dressing/drain CDI, O2 RA, SPO2 98%. VTE ASA. Plan is medical management at this time. Discussed POC for multimodal pain management, monitoring VS/labs/I&O, mobility, day time routine, comfort, and safety. Patient has call light and personal belongings within reach. Safety and fall precautions in place. Reviewed current labs, notes, medications, and orders. Hourly rounding in place with RN and CNA.

## 2018-04-01 PROCEDURE — A9270 NON-COVERED ITEM OR SERVICE: HCPCS | Performed by: PHYSICIAN ASSISTANT

## 2018-04-01 PROCEDURE — A9270 NON-COVERED ITEM OR SERVICE: HCPCS | Performed by: SURGERY

## 2018-04-01 PROCEDURE — 700102 HCHG RX REV CODE 250 W/ 637 OVERRIDE(OP): Performed by: SURGERY

## 2018-04-01 PROCEDURE — A9270 NON-COVERED ITEM OR SERVICE: HCPCS | Performed by: NURSE PRACTITIONER

## 2018-04-01 PROCEDURE — 700112 HCHG RX REV CODE 229: Performed by: SURGERY

## 2018-04-01 PROCEDURE — 770006 HCHG ROOM/CARE - MED/SURG/GYN SEMI*

## 2018-04-01 PROCEDURE — 93970 EXTREMITY STUDY: CPT | Mod: 26 | Performed by: SURGERY

## 2018-04-01 PROCEDURE — 700102 HCHG RX REV CODE 250 W/ 637 OVERRIDE(OP): Performed by: NURSE PRACTITIONER

## 2018-04-01 PROCEDURE — 700102 HCHG RX REV CODE 250 W/ 637 OVERRIDE(OP): Performed by: PHYSICIAN ASSISTANT

## 2018-04-01 PROCEDURE — 93971 EXTREMITY STUDY: CPT

## 2018-04-01 RX ADMIN — MAGNESIUM HYDROXIDE 30 ML: 400 SUSPENSION ORAL at 08:21

## 2018-04-01 RX ADMIN — DOCUSATE SODIUM 100 MG: 100 CAPSULE ORAL at 08:22

## 2018-04-01 RX ADMIN — OXYCODONE HYDROCHLORIDE 15 MG: 10 TABLET ORAL at 06:32

## 2018-04-01 RX ADMIN — OXYCODONE HYDROCHLORIDE 15 MG: 10 TABLET ORAL at 18:22

## 2018-04-01 RX ADMIN — GABAPENTIN 100 MG: 100 CAPSULE ORAL at 08:22

## 2018-04-01 RX ADMIN — POLYETHYLENE GLYCOL 3350 1 PACKET: 17 POWDER, FOR SOLUTION ORAL at 08:22

## 2018-04-01 RX ADMIN — OXYCODONE HYDROCHLORIDE 15 MG: 10 TABLET ORAL at 03:22

## 2018-04-01 RX ADMIN — OXYCODONE HYDROCHLORIDE 15 MG: 10 TABLET ORAL at 10:53

## 2018-04-01 RX ADMIN — OXYCODONE HYDROCHLORIDE 10 MG: 10 TABLET ORAL at 14:18

## 2018-04-01 RX ADMIN — OXYCODONE HYDROCHLORIDE 15 MG: 10 TABLET ORAL at 21:35

## 2018-04-01 RX ADMIN — ASPIRIN 81 MG: 81 TABLET, CHEWABLE ORAL at 08:22

## 2018-04-01 ASSESSMENT — PAIN SCALES - GENERAL
PAINLEVEL_OUTOF10: 6
PAINLEVEL_OUTOF10: 6
PAINLEVEL_OUTOF10: 7
PAINLEVEL_OUTOF10: 6
PAINLEVEL_OUTOF10: 6
PAINLEVEL_OUTOF10: 4
PAINLEVEL_OUTOF10: 4

## 2018-04-01 ASSESSMENT — ENCOUNTER SYMPTOMS
ROS GI COMMENTS: BM 3/30
NERVOUS/ANXIOUS: 0
RESPIRATORY NEGATIVE: 1
SENSORY CHANGE: 1
GASTROINTESTINAL NEGATIVE: 1
CONSTITUTIONAL NEGATIVE: 1

## 2018-04-01 NOTE — PROGRESS NOTES
"Patient seen and examined    Blood pressure 118/64, pulse 78, temperature 36.5 °C (97.7 °F), resp. rate 16, height 1.854 m (6' 1\"), weight 92.2 kg (203 lb 4.2 oz), SpO2 95 %.          No acute distress  Dressing clean dry and intact  Neurovascularly intact    POD#3    Plan:  DVT Prophylaxis- TEDS/SCDs  Weight Bearing Status-TTWB  PT/OT  Elevate  Case Coordination          "
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
## 2018-04-02 LAB
MAGNESIUM SERPL-MCNC: 2.3 MG/DL (ref 1.5–2.5)
PHOSPHATE SERPL-MCNC: 3.4 MG/DL (ref 2.5–4.5)

## 2018-04-02 PROCEDURE — 700111 HCHG RX REV CODE 636 W/ 250 OVERRIDE (IP): Performed by: SURGERY

## 2018-04-02 PROCEDURE — 84100 ASSAY OF PHOSPHORUS: CPT

## 2018-04-02 PROCEDURE — A9270 NON-COVERED ITEM OR SERVICE: HCPCS | Performed by: NURSE PRACTITIONER

## 2018-04-02 PROCEDURE — 770006 HCHG ROOM/CARE - MED/SURG/GYN SEMI*

## 2018-04-02 PROCEDURE — 36415 COLL VENOUS BLD VENIPUNCTURE: CPT

## 2018-04-02 PROCEDURE — 83735 ASSAY OF MAGNESIUM: CPT

## 2018-04-02 PROCEDURE — 700102 HCHG RX REV CODE 250 W/ 637 OVERRIDE(OP): Performed by: PHYSICIAN ASSISTANT

## 2018-04-02 PROCEDURE — A9270 NON-COVERED ITEM OR SERVICE: HCPCS | Performed by: PHYSICIAN ASSISTANT

## 2018-04-02 PROCEDURE — 700102 HCHG RX REV CODE 250 W/ 637 OVERRIDE(OP): Performed by: NURSE PRACTITIONER

## 2018-04-02 RX ADMIN — OXYCODONE HYDROCHLORIDE 15 MG: 10 TABLET ORAL at 03:55

## 2018-04-02 RX ADMIN — OXYCODONE HYDROCHLORIDE 15 MG: 10 TABLET ORAL at 17:30

## 2018-04-02 RX ADMIN — OXYCODONE HYDROCHLORIDE 15 MG: 10 TABLET ORAL at 00:31

## 2018-04-02 RX ADMIN — MORPHINE SULFATE 2 MG: 4 INJECTION INTRAVENOUS at 12:33

## 2018-04-02 RX ADMIN — OXYCODONE HYDROCHLORIDE 15 MG: 10 TABLET ORAL at 20:40

## 2018-04-02 RX ADMIN — OXYCODONE HYDROCHLORIDE 15 MG: 10 TABLET ORAL at 11:08

## 2018-04-02 RX ADMIN — ASPIRIN 81 MG: 81 TABLET, CHEWABLE ORAL at 07:50

## 2018-04-02 RX ADMIN — OXYCODONE HYDROCHLORIDE 15 MG: 10 TABLET ORAL at 14:29

## 2018-04-02 RX ADMIN — OXYCODONE HYDROCHLORIDE 15 MG: 10 TABLET ORAL at 07:50

## 2018-04-02 RX ADMIN — OXYCODONE HYDROCHLORIDE 15 MG: 10 TABLET ORAL at 23:51

## 2018-04-02 ASSESSMENT — PAIN SCALES - GENERAL
PAINLEVEL_OUTOF10: 4
PAINLEVEL_OUTOF10: 2
PAINLEVEL_OUTOF10: 4
PAINLEVEL_OUTOF10: 4
PAINLEVEL_OUTOF10: 2
PAINLEVEL_OUTOF10: 5
PAINLEVEL_OUTOF10: 4
PAINLEVEL_OUTOF10: 2
PAINLEVEL_OUTOF10: 6
PAINLEVEL_OUTOF10: 5
PAINLEVEL_OUTOF10: 3

## 2018-04-02 NOTE — PROGRESS NOTES
"Pt c/o having multiple BM today. Pt was asked this morning by RN if he wanted all of his stool softners and laxatives. Pt stated \" Sure, I want to keep it going. When I came in, it took me 5 days to go\". RN advised pt to tell the night shift nurse to hold his stool softners and laxatives. Will pass on this message in report.  "

## 2018-04-02 NOTE — CARE PLAN
Problem: Safety  Goal: Will remain free from injury  Outcome: PROGRESSING AS EXPECTED  Provided assistance with mobility. Fall prevention measures in place. rounds ongoing.    Problem: Pain Management  Goal: Pain level will decrease to patient's comfort goal  Outcome: PROGRESSING AS EXPECTED  Pain medications given per MAR. Other non-pharmacologic measures for pain utilized. Polar ice in place

## 2018-04-03 PROCEDURE — 770006 HCHG ROOM/CARE - MED/SURG/GYN SEMI*

## 2018-04-03 PROCEDURE — A9270 NON-COVERED ITEM OR SERVICE: HCPCS | Performed by: PHYSICIAN ASSISTANT

## 2018-04-03 PROCEDURE — 700102 HCHG RX REV CODE 250 W/ 637 OVERRIDE(OP): Performed by: PHYSICIAN ASSISTANT

## 2018-04-03 PROCEDURE — 97535 SELF CARE MNGMENT TRAINING: CPT

## 2018-04-03 PROCEDURE — 97116 GAIT TRAINING THERAPY: CPT

## 2018-04-03 PROCEDURE — 97530 THERAPEUTIC ACTIVITIES: CPT

## 2018-04-03 PROCEDURE — A9270 NON-COVERED ITEM OR SERVICE: HCPCS | Performed by: NURSE PRACTITIONER

## 2018-04-03 PROCEDURE — 700102 HCHG RX REV CODE 250 W/ 637 OVERRIDE(OP): Performed by: NURSE PRACTITIONER

## 2018-04-03 RX ADMIN — OXYCODONE HYDROCHLORIDE 15 MG: 10 TABLET ORAL at 06:00

## 2018-04-03 RX ADMIN — GABAPENTIN 100 MG: 100 CAPSULE ORAL at 21:52

## 2018-04-03 RX ADMIN — OXYCODONE HYDROCHLORIDE 15 MG: 10 TABLET ORAL at 09:15

## 2018-04-03 RX ADMIN — OXYCODONE HYDROCHLORIDE 15 MG: 10 TABLET ORAL at 21:52

## 2018-04-03 RX ADMIN — OXYCODONE HYDROCHLORIDE 15 MG: 10 TABLET ORAL at 18:39

## 2018-04-03 RX ADMIN — OXYCODONE HYDROCHLORIDE 15 MG: 10 TABLET ORAL at 02:42

## 2018-04-03 RX ADMIN — OXYCODONE HYDROCHLORIDE 15 MG: 10 TABLET ORAL at 14:21

## 2018-04-03 RX ADMIN — ASPIRIN 81 MG: 81 TABLET, CHEWABLE ORAL at 09:15

## 2018-04-03 ASSESSMENT — COGNITIVE AND FUNCTIONAL STATUS - GENERAL
HELP NEEDED FOR BATHING: A LITTLE
MOVING FROM LYING ON BACK TO SITTING ON SIDE OF FLAT BED: A LITTLE
SUGGESTED CMS G CODE MODIFIER MOBILITY: CK
CLIMB 3 TO 5 STEPS WITH RAILING: A LOT
DRESSING REGULAR LOWER BODY CLOTHING: A LITTLE
STANDING UP FROM CHAIR USING ARMS: A LITTLE
MOBILITY SCORE: 19
WALKING IN HOSPITAL ROOM: A LITTLE
DAILY ACTIVITIY SCORE: 22
SUGGESTED CMS G CODE MODIFIER DAILY ACTIVITY: CJ

## 2018-04-03 ASSESSMENT — GAIT ASSESSMENTS
DISTANCE (FEET): 100
ASSISTIVE DEVICE: CRUTCHES
DEVIATION: ATAXIC
GAIT LEVEL OF ASSIST: CONTACT GUARD ASSIST

## 2018-04-03 ASSESSMENT — PAIN SCALES - GENERAL
PAINLEVEL_OUTOF10: 2
PAINLEVEL_OUTOF10: 5
PAINLEVEL_OUTOF10: 2
PAINLEVEL_OUTOF10: ASSUMED PAIN PRESENT
PAINLEVEL_OUTOF10: 5
PAINLEVEL_OUTOF10: 2
PAINLEVEL_OUTOF10: 4
PAINLEVEL_OUTOF10: 2
PAINLEVEL_OUTOF10: 6

## 2018-04-03 ASSESSMENT — ENCOUNTER SYMPTOMS
RESPIRATORY NEGATIVE: 1
SENSORY CHANGE: 1
GASTROINTESTINAL NEGATIVE: 1
NERVOUS/ANXIOUS: 0
ROS GI COMMENTS: BM 4/1
CONSTITUTIONAL NEGATIVE: 1

## 2018-04-03 NOTE — PROGRESS NOTES
Report received, pt care resumed. Pt watching tv at this time. Left leg elevated on pillows. Splint in place. Pt bedding has come off but restless and moves frequently and did not want replaced at this time. No distress. Pt has call light within reach and will continue to monitor.
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
## 2018-04-03 NOTE — THERAPY
"Physical Therapy Treatment completed.   Bed Mobility:  Supine to Sit:  (NT up in chair)  Transfers: Sit to Stand: Stand by Assist  Gait: Level Of Assist: Contact Guard Assist with Crutches       Plan of Care: Will benefit from Physical Therapy 3 times per week  Discharge Recommendations: Equipment: Will Continue to Assess for Equipment Needs.     See \"Rehab Therapy-Acute\" Patient Summary Report for complete documentation.     Pt still presenting w/ very impulsive tendencies and poor safety awareness. Pt w/ a very fast kari w/ the crutches and is unable to maintain compliance w/ cues to slow down. Pt requiring frequent standing rest breaks and at one point said he felt faint. Pt needed cues for sitting down as pt stating \"I am going to keep going and if I go down then that's what happens.\" At this time, pt is safer using FWW until he gains improvements in standing balance to continue w/ crutches. Pt is not safe for DC home at this time and will benefit from post acute therapy. Will continue to follow to work on functional limitations.  "

## 2018-04-03 NOTE — THERAPY
"Occupational Therapy Treatment completed with focus on ADLs, ADL transfers and patient education.  Functional Status:  Pt seen for OT tx. Mod-I supine > sit, SBA sit > stand w/ FWW. Pt tolerated amb to BR w/ FWW w/ SBA. Pt w/ poor safety awareness, pt required cueing to slow down during functional mobility, ADLs and ADL transfers. SBA toilet transfer, supv for pericare. Pt demonstrated ability to complete standing at the sink w/ SBA. Mod-I to return back to supine. Discussed LB dressing and different compensatory strategies to don/doff LB dressing maintaining precautions. Pt able to appropriately follow NWB precautions during session.    Plan of Care: Will benefit from Occupational Therapy 3 times per week  Discharge Recommendations:  Equipment Front-Wheel Walker and Shower Chair.   See \"Rehab Therapy-Acute\" Patient Summary Report for complete documentation.   "
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
## 2018-04-03 NOTE — PROGRESS NOTES
Pt resting comfortably in bed watching Tv. Left leg is elevated on pillows with polar ice in place. Pain medication recently given. Pt worked with therapy today. Call light within reach and hourly rounding in place.
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
## 2018-04-04 PROCEDURE — 700102 HCHG RX REV CODE 250 W/ 637 OVERRIDE(OP): Performed by: PHYSICIAN ASSISTANT

## 2018-04-04 PROCEDURE — 700102 HCHG RX REV CODE 250 W/ 637 OVERRIDE(OP): Performed by: SURGERY

## 2018-04-04 PROCEDURE — A9270 NON-COVERED ITEM OR SERVICE: HCPCS | Performed by: NURSE PRACTITIONER

## 2018-04-04 PROCEDURE — A9270 NON-COVERED ITEM OR SERVICE: HCPCS | Performed by: SURGERY

## 2018-04-04 PROCEDURE — 700112 HCHG RX REV CODE 229: Performed by: SURGERY

## 2018-04-04 PROCEDURE — A9270 NON-COVERED ITEM OR SERVICE: HCPCS | Performed by: PHYSICIAN ASSISTANT

## 2018-04-04 PROCEDURE — 770006 HCHG ROOM/CARE - MED/SURG/GYN SEMI*

## 2018-04-04 PROCEDURE — 700102 HCHG RX REV CODE 250 W/ 637 OVERRIDE(OP): Performed by: NURSE PRACTITIONER

## 2018-04-04 RX ADMIN — GABAPENTIN 100 MG: 100 CAPSULE ORAL at 15:10

## 2018-04-04 RX ADMIN — DOCUSATE SODIUM 100 MG: 100 CAPSULE ORAL at 09:12

## 2018-04-04 RX ADMIN — STANDARDIZED SENNA CONCENTRATE AND DOCUSATE SODIUM 1 TABLET: 8.6; 5 TABLET, FILM COATED ORAL at 20:10

## 2018-04-04 RX ADMIN — ASPIRIN 81 MG: 81 TABLET, CHEWABLE ORAL at 09:11

## 2018-04-04 RX ADMIN — OXYCODONE HYDROCHLORIDE 15 MG: 10 TABLET ORAL at 06:07

## 2018-04-04 RX ADMIN — OXYCODONE HYDROCHLORIDE 15 MG: 10 TABLET ORAL at 20:10

## 2018-04-04 RX ADMIN — OXYCODONE HYDROCHLORIDE 15 MG: 10 TABLET ORAL at 09:11

## 2018-04-04 RX ADMIN — DOCUSATE SODIUM 100 MG: 100 CAPSULE ORAL at 20:09

## 2018-04-04 RX ADMIN — OXYCODONE HYDROCHLORIDE 15 MG: 10 TABLET ORAL at 01:26

## 2018-04-04 RX ADMIN — OXYCODONE HYDROCHLORIDE 15 MG: 10 TABLET ORAL at 16:05

## 2018-04-04 RX ADMIN — GABAPENTIN 100 MG: 100 CAPSULE ORAL at 09:11

## 2018-04-04 RX ADMIN — OXYCODONE HYDROCHLORIDE 15 MG: 10 TABLET ORAL at 12:43

## 2018-04-04 RX ADMIN — GABAPENTIN 100 MG: 100 CAPSULE ORAL at 20:09

## 2018-04-04 ASSESSMENT — ENCOUNTER SYMPTOMS
GASTROINTESTINAL NEGATIVE: 1
CONSTITUTIONAL NEGATIVE: 1
RESPIRATORY NEGATIVE: 1
ROS GI COMMENTS: BM 4/2
SENSORY CHANGE: 1
NERVOUS/ANXIOUS: 0

## 2018-04-04 ASSESSMENT — PAIN SCALES - GENERAL
PAINLEVEL_OUTOF10: 5
PAINLEVEL_OUTOF10: 6
PAINLEVEL_OUTOF10: 5
PAINLEVEL_OUTOF10: ASSUMED PAIN PRESENT
PAINLEVEL_OUTOF10: 3

## 2018-04-04 NOTE — CARE PLAN
Problem: Safety  Goal: Will remain free from injury  Outcome: PROGRESSING AS EXPECTED  Pt educated to call before getting out of bed; call light and belongings within reach; bed in the lowest position; hourly rounding in place.     Problem: Pain Management  Goal: Pain level will decrease to patient's comfort goal  Outcome: PROGRESSING AS EXPECTED  Pain managed with prn pain medication per MAR; Polar ice to left foot applied.

## 2018-04-04 NOTE — DISCHARGE PLANNING
[facility] has declined, they are not taking any more [place] residents on LOAs.
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
## 2018-04-04 NOTE — DISCHARGE PLANNING
Anticipated DC Disposition: SNF    Action: LSW emailed  and  leadership again regarding next steps as all local facilities have denied.  Supervisor requested that we re-send to [facility], then if they deny we can expand referral to [facility]. LSW requested [facility] re-send referral to [facility].     Barriers to Discharge: No accepting SNF.     Plan: Await response from [facility].

## 2018-04-05 LAB
MAGNESIUM SERPL-MCNC: 2.2 MG/DL (ref 1.5–2.5)
PHOSPHATE SERPL-MCNC: 3 MG/DL (ref 2.5–4.5)

## 2018-04-05 PROCEDURE — 700102 HCHG RX REV CODE 250 W/ 637 OVERRIDE(OP): Performed by: NURSE PRACTITIONER

## 2018-04-05 PROCEDURE — 700102 HCHG RX REV CODE 250 W/ 637 OVERRIDE(OP): Performed by: SURGERY

## 2018-04-05 PROCEDURE — 84100 ASSAY OF PHOSPHORUS: CPT

## 2018-04-05 PROCEDURE — 700102 HCHG RX REV CODE 250 W/ 637 OVERRIDE(OP): Performed by: ORTHOPAEDIC SURGERY

## 2018-04-05 PROCEDURE — A9270 NON-COVERED ITEM OR SERVICE: HCPCS | Performed by: PHYSICIAN ASSISTANT

## 2018-04-05 PROCEDURE — A9270 NON-COVERED ITEM OR SERVICE: HCPCS | Performed by: NURSE PRACTITIONER

## 2018-04-05 PROCEDURE — 770006 HCHG ROOM/CARE - MED/SURG/GYN SEMI*

## 2018-04-05 PROCEDURE — A9270 NON-COVERED ITEM OR SERVICE: HCPCS | Performed by: ORTHOPAEDIC SURGERY

## 2018-04-05 PROCEDURE — 700106 HCHG RX REV CODE 271: Performed by: NURSE PRACTITIONER

## 2018-04-05 PROCEDURE — 36415 COLL VENOUS BLD VENIPUNCTURE: CPT

## 2018-04-05 PROCEDURE — 2W0MX1Z CHANGE SPLINT ON LEFT LOWER EXTREMITY: ICD-10-PCS | Performed by: ORTHOPAEDIC SURGERY

## 2018-04-05 PROCEDURE — 83735 ASSAY OF MAGNESIUM: CPT

## 2018-04-05 PROCEDURE — A9270 NON-COVERED ITEM OR SERVICE: HCPCS | Performed by: SURGERY

## 2018-04-05 PROCEDURE — 700112 HCHG RX REV CODE 229: Performed by: SURGERY

## 2018-04-05 PROCEDURE — 3E00XGC INTRODUCTION OF OTHER THERAPEUTIC SUBSTANCE INTO SKIN AND MUCOUS MEMBRANES, EXTERNAL APPROACH: ICD-10-PCS | Performed by: ORTHOPAEDIC SURGERY

## 2018-04-05 PROCEDURE — 700102 HCHG RX REV CODE 250 W/ 637 OVERRIDE(OP): Performed by: PHYSICIAN ASSISTANT

## 2018-04-05 RX ORDER — SULFAMETHOXAZOLE AND TRIMETHOPRIM 800; 160 MG/1; MG/1
1 TABLET ORAL EVERY 12 HOURS
Status: DISCONTINUED | OUTPATIENT
Start: 2018-04-05 | End: 2018-04-12

## 2018-04-05 RX ADMIN — SULFAMETHOXAZOLE AND TRIMETHOPRIM 1 TABLET: 800; 160 TABLET ORAL at 20:50

## 2018-04-05 RX ADMIN — OXYCODONE HYDROCHLORIDE 15 MG: 10 TABLET ORAL at 00:00

## 2018-04-05 RX ADMIN — OXYCODONE HYDROCHLORIDE 15 MG: 10 TABLET ORAL at 22:01

## 2018-04-05 RX ADMIN — ASPIRIN 81 MG: 81 TABLET, CHEWABLE ORAL at 08:07

## 2018-04-05 RX ADMIN — OXYCODONE HYDROCHLORIDE 15 MG: 10 TABLET ORAL at 06:12

## 2018-04-05 RX ADMIN — OXYCODONE HYDROCHLORIDE 15 MG: 10 TABLET ORAL at 12:11

## 2018-04-05 RX ADMIN — GABAPENTIN 100 MG: 100 CAPSULE ORAL at 08:07

## 2018-04-05 RX ADMIN — OXYCODONE HYDROCHLORIDE 15 MG: 10 TABLET ORAL at 03:06

## 2018-04-05 RX ADMIN — OXYCODONE HYDROCHLORIDE 15 MG: 10 TABLET ORAL at 18:56

## 2018-04-05 RX ADMIN — GABAPENTIN 100 MG: 100 CAPSULE ORAL at 19:08

## 2018-04-05 RX ADMIN — DOCUSATE SODIUM 100 MG: 100 CAPSULE ORAL at 08:07

## 2018-04-05 RX ADMIN — GABAPENTIN 100 MG: 100 CAPSULE ORAL at 15:10

## 2018-04-05 RX ADMIN — ACETAMINOPHEN 650 MG: 325 TABLET, FILM COATED ORAL at 20:54

## 2018-04-05 RX ADMIN — OXYCODONE HYDROCHLORIDE 15 MG: 10 TABLET ORAL at 15:10

## 2018-04-05 RX ADMIN — Medication 4 EACH: at 13:30

## 2018-04-05 RX ADMIN — OXYCODONE HYDROCHLORIDE 15 MG: 10 TABLET ORAL at 09:15

## 2018-04-05 ASSESSMENT — ENCOUNTER SYMPTOMS
NERVOUS/ANXIOUS: 0
ROS GI COMMENTS: BM 4/4
RESPIRATORY NEGATIVE: 1
GASTROINTESTINAL NEGATIVE: 1
CONSTITUTIONAL NEGATIVE: 1
SENSORY CHANGE: 1

## 2018-04-05 ASSESSMENT — PAIN SCALES - GENERAL
PAINLEVEL_OUTOF10: 4
PAINLEVEL_OUTOF10: 5
PAINLEVEL_OUTOF10: 6
PAINLEVEL_OUTOF10: 6
PAINLEVEL_OUTOF10: 3
PAINLEVEL_OUTOF10: 6
PAINLEVEL_OUTOF10: ASSUMED PAIN PRESENT
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
## 2018-04-05 NOTE — THERAPY
Attempted to see pt for therapy. Pt dressing was removed for observation and is now awaiting a new splint application. Will re-attempt once new splint is applied. RN aware of attempt and in agreement to wait on therapy until splint has been applied.

## 2018-04-05 NOTE — THERAPY
Attempted to see pt for OT tx. Pt currently getting hill therapy for LLE. RN asked to hold. Will try again later as appropriate.

## 2018-04-05 NOTE — DISCHARGE PLANNING
Received call from pt's sister-in-law, Vale Kline. She expressed concern about pt and states that she and spouse, pt's brother have not spoken to MD. I explained that since pt is and adult, we are not able to share information unless pt gives up permission to discuss with family. She also expressed concerns about pt's finances since he is uninsured and unable to work. Per  notes, they intend to re-visit pt soon and will assist with resources. I spoke to pt and he gave verbal consent to share medical information with family. Spoke to NICOLE Lopez. He will call brother.

## 2018-04-05 NOTE — CARE PLAN
Problem: Communication  Goal: The ability to communicate needs accurately and effectively will improve  Discussed POC, pt communicates questions and poc well. Pt and family understand poc.    Problem: Safety  Goal: Will remain free from falls  Educated on safety measures, using call light ect

## 2018-04-05 NOTE — CARE PLAN
Problem: Safety  Goal: Will remain free from falls  Outcome: PROGRESSING AS EXPECTED      Problem: Infection  Goal: Will remain free from infection  Outcome: PROGRESSING AS EXPECTED      Problem: Bowel/Gastric:  Goal: Normal bowel function is maintained or improved  Outcome: PROGRESSING AS EXPECTED      Problem: Pain Management  Goal: Pain level will decrease to patient's comfort goal  Outcome: PROGRESSING AS EXPECTED      Problem: Mobility  Goal: Risk for activity intolerance will decrease  Outcome: PROGRESSING AS EXPECTED
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
## 2018-04-05 NOTE — CARE PLAN
Problem: Venous Thromboembolism (VTW)/Deep Vein Thrombosis (DVT) Prevention:  Goal: Patient will participate in Venous Thrombosis (VTE)/Deep Vein Thrombosis (DVT)Prevention Measures  Outcome: PROGRESSING AS EXPECTED  SCDs on.     Problem: Mobility  Goal: Risk for activity intolerance will decrease  Outcome: PROGRESSING AS EXPECTED  Pt encouraged to mobilize; stand by assist with FWW, tolerates well.

## 2018-04-05 NOTE — DISCHARGE PLANNING
Spoke with Benitez at [facility]. Referral is pending evaluation with DON and admin, needing to know how long we are willing to do MINDY for as patient has extensive medical needs. Emailed Cat to ask how long she will would be able to do the MINDY for.
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
## 2018-04-06 PROCEDURE — A9270 NON-COVERED ITEM OR SERVICE: HCPCS | Performed by: ORTHOPAEDIC SURGERY

## 2018-04-06 PROCEDURE — A9270 NON-COVERED ITEM OR SERVICE: HCPCS | Performed by: SURGERY

## 2018-04-06 PROCEDURE — 700102 HCHG RX REV CODE 250 W/ 637 OVERRIDE(OP): Performed by: PHYSICIAN ASSISTANT

## 2018-04-06 PROCEDURE — A9270 NON-COVERED ITEM OR SERVICE: HCPCS | Performed by: PHYSICIAN ASSISTANT

## 2018-04-06 PROCEDURE — 97535 SELF CARE MNGMENT TRAINING: CPT

## 2018-04-06 PROCEDURE — 700102 HCHG RX REV CODE 250 W/ 637 OVERRIDE(OP): Performed by: NURSE PRACTITIONER

## 2018-04-06 PROCEDURE — 97116 GAIT TRAINING THERAPY: CPT

## 2018-04-06 PROCEDURE — 700106 HCHG RX REV CODE 271: Performed by: NURSE PRACTITIONER

## 2018-04-06 PROCEDURE — 700102 HCHG RX REV CODE 250 W/ 637 OVERRIDE(OP): Performed by: ORTHOPAEDIC SURGERY

## 2018-04-06 PROCEDURE — 97530 THERAPEUTIC ACTIVITIES: CPT

## 2018-04-06 PROCEDURE — 97110 THERAPEUTIC EXERCISES: CPT

## 2018-04-06 PROCEDURE — 700112 HCHG RX REV CODE 229: Performed by: SURGERY

## 2018-04-06 PROCEDURE — 700102 HCHG RX REV CODE 250 W/ 637 OVERRIDE(OP): Performed by: SURGERY

## 2018-04-06 PROCEDURE — 770006 HCHG ROOM/CARE - MED/SURG/GYN SEMI*

## 2018-04-06 PROCEDURE — A9270 NON-COVERED ITEM OR SERVICE: HCPCS | Performed by: NURSE PRACTITIONER

## 2018-04-06 RX ADMIN — POLYETHYLENE GLYCOL 3350 1 PACKET: 17 POWDER, FOR SOLUTION ORAL at 20:13

## 2018-04-06 RX ADMIN — GABAPENTIN 100 MG: 100 CAPSULE ORAL at 15:50

## 2018-04-06 RX ADMIN — ASPIRIN 81 MG: 81 TABLET, CHEWABLE ORAL at 08:20

## 2018-04-06 RX ADMIN — OXYCODONE HYDROCHLORIDE 15 MG: 10 TABLET ORAL at 20:11

## 2018-04-06 RX ADMIN — DOCUSATE SODIUM 100 MG: 100 CAPSULE ORAL at 20:12

## 2018-04-06 RX ADMIN — SULFAMETHOXAZOLE AND TRIMETHOPRIM 1 TABLET: 800; 160 TABLET ORAL at 08:20

## 2018-04-06 RX ADMIN — GABAPENTIN 100 MG: 100 CAPSULE ORAL at 20:12

## 2018-04-06 RX ADMIN — GABAPENTIN 100 MG: 100 CAPSULE ORAL at 08:20

## 2018-04-06 RX ADMIN — STANDARDIZED SENNA CONCENTRATE AND DOCUSATE SODIUM 1 TABLET: 8.6; 5 TABLET, FILM COATED ORAL at 20:12

## 2018-04-06 RX ADMIN — ACETAMINOPHEN 650 MG: 325 TABLET, FILM COATED ORAL at 08:20

## 2018-04-06 RX ADMIN — OXYCODONE HYDROCHLORIDE 15 MG: 10 TABLET ORAL at 15:50

## 2018-04-06 RX ADMIN — Medication 4 EACH: at 13:00

## 2018-04-06 RX ADMIN — SULFAMETHOXAZOLE AND TRIMETHOPRIM 1 TABLET: 800; 160 TABLET ORAL at 20:12

## 2018-04-06 RX ADMIN — DOCUSATE SODIUM 100 MG: 100 CAPSULE ORAL at 08:20

## 2018-04-06 RX ADMIN — OXYCODONE HYDROCHLORIDE 15 MG: 10 TABLET ORAL at 08:20

## 2018-04-06 RX ADMIN — OXYCODONE HYDROCHLORIDE 15 MG: 10 TABLET ORAL at 23:19

## 2018-04-06 RX ADMIN — OXYCODONE HYDROCHLORIDE 15 MG: 10 TABLET ORAL at 00:52

## 2018-04-06 RX ADMIN — OXYCODONE HYDROCHLORIDE 15 MG: 10 TABLET ORAL at 12:51

## 2018-04-06 RX ADMIN — OXYCODONE HYDROCHLORIDE 15 MG: 10 TABLET ORAL at 04:25

## 2018-04-06 ASSESSMENT — PAIN SCALES - GENERAL
PAINLEVEL_OUTOF10: 8
PAINLEVEL_OUTOF10: 7
PAINLEVEL_OUTOF10: ASSUMED PAIN PRESENT
PAINLEVEL_OUTOF10: 6
PAINLEVEL_OUTOF10: ASSUMED PAIN PRESENT
PAINLEVEL_OUTOF10: 6

## 2018-04-06 ASSESSMENT — COGNITIVE AND FUNCTIONAL STATUS - GENERAL
DRESSING REGULAR LOWER BODY CLOTHING: A LITTLE
SUGGESTED CMS G CODE MODIFIER DAILY ACTIVITY: CJ
STANDING UP FROM CHAIR USING ARMS: A LITTLE
CLIMB 3 TO 5 STEPS WITH RAILING: A LOT
SUGGESTED CMS G CODE MODIFIER MOBILITY: CK
MOVING FROM LYING ON BACK TO SITTING ON SIDE OF FLAT BED: A LITTLE
MOBILITY SCORE: 19
WALKING IN HOSPITAL ROOM: A LITTLE
HELP NEEDED FOR BATHING: A LITTLE
DAILY ACTIVITIY SCORE: 22

## 2018-04-06 ASSESSMENT — GAIT ASSESSMENTS
GAIT LEVEL OF ASSIST: STAND BY ASSIST
ASSISTIVE DEVICE: FRONT WHEEL WALKER
DISTANCE (FEET): 75
DEVIATION: ANTALGIC

## 2018-04-06 ASSESSMENT — ENCOUNTER SYMPTOMS
GASTROINTESTINAL NEGATIVE: 1
RESPIRATORY NEGATIVE: 1
NERVOUS/ANXIOUS: 0
CONSTITUTIONAL NEGATIVE: 1
ROS GI COMMENTS: BM 4/5
SENSORY CHANGE: 1

## 2018-04-06 NOTE — THERAPY
"Physical Therapy Treatment completed.   Bed Mobility:  Supine to Sit: Modified Independent  Transfers: Sit to Stand: Stand by Assist  Gait: Level Of Assist: Stand by Assist with Front-Wheel Walker       Plan of Care: Will benefit from Physical Therapy 3 times per week  Discharge Recommendations: Equipment: Will Continue to Assess for Equipment Needs.    See \"Rehab Therapy-Acute\" Patient Summary Report for complete documentation.     Pt presenting w/ improved functional mobility. He has the strength to perform most mobility however is a very high fall risk d/t impulsive tendencies during mobility. Pt does require frequent standing rest breaks w/ a pillow put under his L LE during ambulation. Pt also is very guarded in his L hip causing muscle cramps. Pt given education on positional changes for stretching L hip flexors. Pt has 3 steps into home w/ no railing and at this time is not safe for crutches training on the stairs. Pt is also a very high fall risk and w/ current medical management, pt is not safe for DC home. As per initial eval, pt will benefit from post acute therapy.  "
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
## 2018-04-06 NOTE — THERAPY
"Occupational Therapy Treatment completed with focus on ADLs, ADL transfers and patient education.  Functional Status:  Pt seen for OT tx. Discussed role of OT w/ pt. Pt required re-education and extra time to process information and strategies for LB dressing. Demonstrated ability to physically complete LB dressing. Pt declined to wear pants/short because of L LE cast and pain. Pt pleasant and cooperative. Pt emotional about current limitations and concerns about possibility of amp for toes. Pt given support, psychosocial intervention addressed. Pt continues to be limited by poor safety awareness and impulsivity.   Plan of Care: Will benefit from Occupational Therapy 3 times per week  Discharge Recommendations:  Equipment Will Continue to Assess for Equipment Needs.     See \"Rehab Therapy-Acute\" Patient Summary Report for complete documentation.   "

## 2018-04-06 NOTE — PROCEDURES
Patient seen per request of Dr Aguayo for splinting of left foot with dressing change.  The indications, risks, benefits, and alternatives of splint application were presented to the patient.  Understanding this the patient wished to proceed with splint application.  The left foot first had old bloody splint removed and dressings removed.  New dressings were placed.  The foot was then wrapped with soft roll then a posterior short leg posterior slab mediglass splint was applied and held in place using ace wraps.  The patient was DNVI with < 2 sec cap refill before and after splint application.  The patient reported good fit and comfort of splint after application.
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
## 2018-04-06 NOTE — CARE PLAN
Problem: Pain Management  Goal: Pain level will decrease to patient's comfort goal  Outcome: PROGRESSING AS EXPECTED  Pain being managed with oxycodone IR 15 mg prn per MAR.     Problem: Mobility  Goal: Risk for activity intolerance will decrease  Outcome: PROGRESSING AS EXPECTED  Pt able to ambulate with FWW and standby assist; tolerates well.
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
## 2018-04-07 PROCEDURE — 700102 HCHG RX REV CODE 250 W/ 637 OVERRIDE(OP): Performed by: ORTHOPAEDIC SURGERY

## 2018-04-07 PROCEDURE — 700112 HCHG RX REV CODE 229: Performed by: SURGERY

## 2018-04-07 PROCEDURE — 700111 HCHG RX REV CODE 636 W/ 250 OVERRIDE (IP): Performed by: NURSE PRACTITIONER

## 2018-04-07 PROCEDURE — A9270 NON-COVERED ITEM OR SERVICE: HCPCS | Performed by: SURGERY

## 2018-04-07 PROCEDURE — A9270 NON-COVERED ITEM OR SERVICE: HCPCS | Performed by: PHYSICIAN ASSISTANT

## 2018-04-07 PROCEDURE — 770006 HCHG ROOM/CARE - MED/SURG/GYN SEMI*

## 2018-04-07 PROCEDURE — A9270 NON-COVERED ITEM OR SERVICE: HCPCS | Performed by: ORTHOPAEDIC SURGERY

## 2018-04-07 PROCEDURE — 700102 HCHG RX REV CODE 250 W/ 637 OVERRIDE(OP): Performed by: NURSE PRACTITIONER

## 2018-04-07 PROCEDURE — 93971 EXTREMITY STUDY: CPT

## 2018-04-07 PROCEDURE — 700102 HCHG RX REV CODE 250 W/ 637 OVERRIDE(OP): Performed by: PHYSICIAN ASSISTANT

## 2018-04-07 PROCEDURE — 700102 HCHG RX REV CODE 250 W/ 637 OVERRIDE(OP): Performed by: SURGERY

## 2018-04-07 PROCEDURE — 700106 HCHG RX REV CODE 271: Performed by: NURSE PRACTITIONER

## 2018-04-07 PROCEDURE — A9270 NON-COVERED ITEM OR SERVICE: HCPCS | Performed by: NURSE PRACTITIONER

## 2018-04-07 PROCEDURE — 93971 EXTREMITY STUDY: CPT | Mod: 26 | Performed by: SURGERY

## 2018-04-07 RX ADMIN — GABAPENTIN 100 MG: 100 CAPSULE ORAL at 15:53

## 2018-04-07 RX ADMIN — ASPIRIN 81 MG: 81 TABLET, CHEWABLE ORAL at 09:04

## 2018-04-07 RX ADMIN — OXYCODONE HYDROCHLORIDE 15 MG: 10 TABLET ORAL at 12:36

## 2018-04-07 RX ADMIN — OXYCODONE HYDROCHLORIDE 15 MG: 10 TABLET ORAL at 19:07

## 2018-04-07 RX ADMIN — MAGNESIUM HYDROXIDE 30 ML: 400 SUSPENSION ORAL at 09:00

## 2018-04-07 RX ADMIN — DOCUSATE SODIUM 100 MG: 100 CAPSULE ORAL at 09:04

## 2018-04-07 RX ADMIN — DOCUSATE SODIUM 100 MG: 100 CAPSULE ORAL at 20:14

## 2018-04-07 RX ADMIN — ENOXAPARIN SODIUM 30 MG: 100 INJECTION SUBCUTANEOUS at 20:14

## 2018-04-07 RX ADMIN — OXYCODONE HYDROCHLORIDE 15 MG: 10 TABLET ORAL at 15:53

## 2018-04-07 RX ADMIN — GABAPENTIN 100 MG: 100 CAPSULE ORAL at 20:14

## 2018-04-07 RX ADMIN — STANDARDIZED SENNA CONCENTRATE AND DOCUSATE SODIUM 1 TABLET: 8.6; 5 TABLET, FILM COATED ORAL at 20:14

## 2018-04-07 RX ADMIN — OXYCODONE HYDROCHLORIDE 15 MG: 10 TABLET ORAL at 22:10

## 2018-04-07 RX ADMIN — OXYCODONE HYDROCHLORIDE 15 MG: 10 TABLET ORAL at 03:06

## 2018-04-07 RX ADMIN — OXYCODONE HYDROCHLORIDE 15 MG: 10 TABLET ORAL at 06:15

## 2018-04-07 RX ADMIN — Medication 4 EACH: at 13:00

## 2018-04-07 RX ADMIN — SULFAMETHOXAZOLE AND TRIMETHOPRIM 1 TABLET: 800; 160 TABLET ORAL at 09:00

## 2018-04-07 RX ADMIN — SULFAMETHOXAZOLE AND TRIMETHOPRIM 1 TABLET: 800; 160 TABLET ORAL at 20:14

## 2018-04-07 RX ADMIN — ENOXAPARIN SODIUM 30 MG: 100 INJECTION SUBCUTANEOUS at 11:03

## 2018-04-07 RX ADMIN — POLYETHYLENE GLYCOL 3350 1 PACKET: 17 POWDER, FOR SOLUTION ORAL at 09:00

## 2018-04-07 RX ADMIN — OXYCODONE HYDROCHLORIDE 15 MG: 10 TABLET ORAL at 09:04

## 2018-04-07 RX ADMIN — GABAPENTIN 100 MG: 100 CAPSULE ORAL at 09:04

## 2018-04-07 ASSESSMENT — LIFESTYLE VARIABLES: DO YOU DRINK ALCOHOL: NO

## 2018-04-07 ASSESSMENT — PAIN SCALES - GENERAL
PAINLEVEL_OUTOF10: 6
PAINLEVEL_OUTOF10: 6

## 2018-04-07 ASSESSMENT — ENCOUNTER SYMPTOMS
RESPIRATORY NEGATIVE: 1
CONSTITUTIONAL NEGATIVE: 1
NERVOUS/ANXIOUS: 0
SENSORY CHANGE: 1
ROS GI COMMENTS: BM 4/6
GASTROINTESTINAL NEGATIVE: 1

## 2018-04-07 NOTE — CARE PLAN
Problem: Safety  Goal: Will remain free from falls  Outcome: PROGRESSING AS EXPECTED  Pt calls out for help and uses assist devices appropriately    Problem: Pain Management  Goal: Pain level will decrease to patient's comfort goal  Outcome: PROGRESSING AS EXPECTED
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
## 2018-04-07 NOTE — PROGRESS NOTES
LIMB PRESERVATION SERVICE NOTE:    Wound(s):Left foot Grade 3 open Lisfranc fracture dislocation (rays 1-5),  Left calcaneus non-displaced anterior process fracture, left talus avulsion fracture, Left 3rd metatarsal base fracture, left foot Grade 2 open left medial malleolus fracture  Allergies: Patient has no known allergies.  Start of Care: 4/7/2018  Room/Bed  T303/02      Procedures/Frequency:     LEECHES  Administration (left lower extremity):  1 leech on great toe  2 leeches on 2nd toe  1 leech mid-foot     Leeches latched   Dusky tissue improved after administration.   45min admin time. Leeches. 2 to 3X size and removed and disposed of according to policy.     Patient Education: Amputation, Circulation, Wound Care       Professional Collaboration: Bedside RN, Dr Aguayo,      Assessment:       Wound etiology: Trauma     Wound Progress:  Left Foot 1st and 2nd Digit dorsal aspect warmer and improved color. Edema less on dorsal aspect of foot. Patient states they feel pressure on dorsal aspect of toes.     Rationale for Treatment: Leech Therapy promotes wound healing by applying a vacuum through suction increases blood flow to the area facilitating healing.     Patient tolerance/compliance: pt willing to comply     Complicating factors: ischemic tissue, massive trauma     Need for ongoing  Wound Care: Nursing to complete wound care, LPS to follow     Goals: Decreased wound size 2% each week -- 100% granulation in 2 weeks     Plan:       Treatment Plan and Recommendations:     1. Wound:                            Left foot Grade 3 open Lisfranc fracture dislocation (rays 1-5),  Left calcaneus non-displaced anterior process fracture, left talus avulsion fracture, Left 3rd metatarsal base fracture, left foot Grade 2 open left medial malleolus fracture     2. Labs\Imaging:                          RODRIGUE - Reviewed                          X-Ray(s) - Reviewed     3. Treatment:   Wound Care by Wound Team, LPS to Follow.                           Wound Team to admin leeches every day at 1300 hours                            5. Collaboration:                           IV Antibiotics per IM        Anticipated discharge plans (X):  SNF: X           Home Care:           Outpatient Wound Center:            Self Care:            Other:           TBD: X
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
## 2018-04-07 NOTE — CARE PLAN
Problem: Communication  Goal: The ability to communicate needs accurately and effectively will improve  Outcome: PROGRESSING AS EXPECTED  Pt uses call light appropriately to call for assistance.     Problem: Medication  Goal: Compliance with prescribed medication will improve  Outcome: PROGRESSING AS EXPECTED  Pt is compliant with all medications.

## 2018-04-08 LAB
ANION GAP SERPL CALC-SCNC: 7 MMOL/L (ref 0–11.9)
BASOPHILS # BLD AUTO: 0.6 % (ref 0–1.8)
BASOPHILS # BLD: 0.05 K/UL (ref 0–0.12)
BUN SERPL-MCNC: 18 MG/DL (ref 8–22)
CALCIUM SERPL-MCNC: 8.4 MG/DL (ref 8.5–10.5)
CHLORIDE SERPL-SCNC: 105 MMOL/L (ref 96–112)
CO2 SERPL-SCNC: 23 MMOL/L (ref 20–33)
CREAT SERPL-MCNC: 1.17 MG/DL (ref 0.5–1.4)
EOSINOPHIL # BLD AUTO: 0.24 K/UL (ref 0–0.51)
EOSINOPHIL NFR BLD: 2.7 % (ref 0–6.9)
ERYTHROCYTE [DISTWIDTH] IN BLOOD BY AUTOMATED COUNT: 45.7 FL (ref 35.9–50)
GLUCOSE SERPL-MCNC: 100 MG/DL (ref 65–99)
HCT VFR BLD AUTO: 28 % (ref 42–52)
HGB BLD-MCNC: 9 G/DL (ref 14–18)
IMM GRANULOCYTES # BLD AUTO: 0.09 K/UL (ref 0–0.11)
IMM GRANULOCYTES NFR BLD AUTO: 1 % (ref 0–0.9)
LYMPHOCYTES # BLD AUTO: 1.93 K/UL (ref 1–4.8)
LYMPHOCYTES NFR BLD: 21.4 % (ref 22–41)
MCH RBC QN AUTO: 29 PG (ref 27–33)
MCHC RBC AUTO-ENTMCNC: 32.1 G/DL (ref 33.7–35.3)
MCV RBC AUTO: 90.3 FL (ref 81.4–97.8)
MONOCYTES # BLD AUTO: 0.92 K/UL (ref 0–0.85)
MONOCYTES NFR BLD AUTO: 10.2 % (ref 0–13.4)
NEUTROPHILS # BLD AUTO: 5.77 K/UL (ref 1.82–7.42)
NEUTROPHILS NFR BLD: 64.1 % (ref 44–72)
NRBC # BLD AUTO: 0 K/UL
NRBC BLD-RTO: 0 /100 WBC
PLATELET # BLD AUTO: 587 K/UL (ref 164–446)
PMV BLD AUTO: 8.1 FL (ref 9–12.9)
POTASSIUM SERPL-SCNC: 4.4 MMOL/L (ref 3.6–5.5)
RBC # BLD AUTO: 3.1 M/UL (ref 4.7–6.1)
SODIUM SERPL-SCNC: 135 MMOL/L (ref 135–145)
WBC # BLD AUTO: 9 K/UL (ref 4.8–10.8)

## 2018-04-08 PROCEDURE — 700102 HCHG RX REV CODE 250 W/ 637 OVERRIDE(OP): Performed by: SURGERY

## 2018-04-08 PROCEDURE — 700102 HCHG RX REV CODE 250 W/ 637 OVERRIDE(OP): Performed by: PHYSICIAN ASSISTANT

## 2018-04-08 PROCEDURE — 80048 BASIC METABOLIC PNL TOTAL CA: CPT

## 2018-04-08 PROCEDURE — 700106 HCHG RX REV CODE 271: Performed by: NURSE PRACTITIONER

## 2018-04-08 PROCEDURE — 700111 HCHG RX REV CODE 636 W/ 250 OVERRIDE (IP): Performed by: NURSE PRACTITIONER

## 2018-04-08 PROCEDURE — 770006 HCHG ROOM/CARE - MED/SURG/GYN SEMI*

## 2018-04-08 PROCEDURE — A9270 NON-COVERED ITEM OR SERVICE: HCPCS | Performed by: SURGERY

## 2018-04-08 PROCEDURE — 700102 HCHG RX REV CODE 250 W/ 637 OVERRIDE(OP): Performed by: ORTHOPAEDIC SURGERY

## 2018-04-08 PROCEDURE — A9270 NON-COVERED ITEM OR SERVICE: HCPCS | Performed by: NURSE PRACTITIONER

## 2018-04-08 PROCEDURE — 36415 COLL VENOUS BLD VENIPUNCTURE: CPT

## 2018-04-08 PROCEDURE — 700112 HCHG RX REV CODE 229: Performed by: SURGERY

## 2018-04-08 PROCEDURE — A9270 NON-COVERED ITEM OR SERVICE: HCPCS | Performed by: ORTHOPAEDIC SURGERY

## 2018-04-08 PROCEDURE — 700102 HCHG RX REV CODE 250 W/ 637 OVERRIDE(OP): Performed by: NURSE PRACTITIONER

## 2018-04-08 PROCEDURE — 85025 COMPLETE CBC W/AUTO DIFF WBC: CPT

## 2018-04-08 PROCEDURE — A9270 NON-COVERED ITEM OR SERVICE: HCPCS | Performed by: PHYSICIAN ASSISTANT

## 2018-04-08 RX ADMIN — POLYETHYLENE GLYCOL 3350 1 PACKET: 17 POWDER, FOR SOLUTION ORAL at 08:34

## 2018-04-08 RX ADMIN — Medication 4 EACH: at 13:00

## 2018-04-08 RX ADMIN — MAGNESIUM HYDROXIDE 30 ML: 400 SUSPENSION ORAL at 08:34

## 2018-04-08 RX ADMIN — OXYCODONE HYDROCHLORIDE 10 MG: 10 TABLET ORAL at 09:53

## 2018-04-08 RX ADMIN — SULFAMETHOXAZOLE AND TRIMETHOPRIM 1 TABLET: 800; 160 TABLET ORAL at 20:31

## 2018-04-08 RX ADMIN — OXYCODONE HYDROCHLORIDE 15 MG: 10 TABLET ORAL at 13:07

## 2018-04-08 RX ADMIN — ENOXAPARIN SODIUM 30 MG: 100 INJECTION SUBCUTANEOUS at 08:35

## 2018-04-08 RX ADMIN — ACETAMINOPHEN 650 MG: 325 TABLET, FILM COATED ORAL at 07:48

## 2018-04-08 RX ADMIN — ASPIRIN 81 MG: 81 TABLET, CHEWABLE ORAL at 08:34

## 2018-04-08 RX ADMIN — OXYCODONE HYDROCHLORIDE 15 MG: 10 TABLET ORAL at 19:36

## 2018-04-08 RX ADMIN — ACETAMINOPHEN 650 MG: 325 TABLET, FILM COATED ORAL at 12:27

## 2018-04-08 RX ADMIN — OXYCODONE HYDROCHLORIDE 15 MG: 10 TABLET ORAL at 16:28

## 2018-04-08 RX ADMIN — OXYCODONE HYDROCHLORIDE 15 MG: 10 TABLET ORAL at 01:15

## 2018-04-08 RX ADMIN — GABAPENTIN 100 MG: 100 CAPSULE ORAL at 20:31

## 2018-04-08 RX ADMIN — GABAPENTIN 100 MG: 100 CAPSULE ORAL at 08:34

## 2018-04-08 RX ADMIN — GABAPENTIN 100 MG: 100 CAPSULE ORAL at 16:28

## 2018-04-08 RX ADMIN — OXYCODONE HYDROCHLORIDE 15 MG: 10 TABLET ORAL at 04:07

## 2018-04-08 RX ADMIN — SULFAMETHOXAZOLE AND TRIMETHOPRIM 1 TABLET: 800; 160 TABLET ORAL at 08:34

## 2018-04-08 RX ADMIN — DOCUSATE SODIUM 100 MG: 100 CAPSULE ORAL at 08:34

## 2018-04-08 RX ADMIN — ENOXAPARIN SODIUM 30 MG: 100 INJECTION SUBCUTANEOUS at 20:31

## 2018-04-08 ASSESSMENT — ENCOUNTER SYMPTOMS
GASTROINTESTINAL NEGATIVE: 1
SENSORY CHANGE: 1
NERVOUS/ANXIOUS: 0
ROS GI COMMENTS: BM 4/7
RESPIRATORY NEGATIVE: 1
CONSTITUTIONAL NEGATIVE: 1

## 2018-04-08 ASSESSMENT — PAIN SCALES - GENERAL
PAINLEVEL_OUTOF10: 5
PAINLEVEL_OUTOF10: 5

## 2018-04-08 ASSESSMENT — PATIENT HEALTH QUESTIONNAIRE - PHQ9
SUM OF ALL RESPONSES TO PHQ9 QUESTIONS 1 AND 2: 0
1. LITTLE INTEREST OR PLEASURE IN DOING THINGS: NOT AT ALL
2. FEELING DOWN, DEPRESSED, IRRITABLE, OR HOPELESS: NOT AT ALL

## 2018-04-08 NOTE — PROGRESS NOTES
"LIMB PRESERVATION SERVICE NOTE:     Wound(s):Left foot Grade 3 open Lisfranc fracture dislocation (rays 1-5),  Left calcaneus non-displaced anterior process fracture, left talus avulsion fracture, Left 3rd metatarsal base fracture, left foot Grade 2 open left medial malleolus fracture  Allergies: Patient has no known allergies.  Start of Care: 4/7/2018  Room/Bed  T303/02       Procedures/Frequency:     LEECHES  Administration (left lower extremity):  1 leech on great toe  2 leeches on 2nd toe  1 leech mid-foot     Leeches latched   Dusky tissue improved after administration.   45min admin time. Leeches. 2 to 3X size and removed and disposed of according to policy.     Patient Education: Amputation, Circulation, Wound Care       Professional Collaboration: Bedside RN, Dr Aguayo,      Assessment:       /85   Pulse 68   Temp 36.9 °C (98.4 °F)   Resp 16   Ht 1.854 m (6' 1\")   Wt 92.2 kg (203 lb 4.2 oz)   SpO2 94%   BMI 26.82 kg/m²     Wound etiology: Trauma     Wound Progress:  Left Foot 1st and 2nd Digit dorsal aspect warmer and improved color. Edema less on dorsal aspect of foot. Patient states they feel pressure on dorsal aspect of toes.     Rationale for Treatment: Leech Therapy promotes wound healing by applying a vacuum through suction increases blood flow to the area facilitating healing.     Patient tolerance/compliance: pt willing to comply     Complicating factors: ischemic tissue, massive trauma     Need for ongoing  Wound Care: Nursing to complete wound care, LPS to follow     Goals: Decreased wound size 2% each week -- 100% granulation in 2 weeks     Plan:       Treatment Plan and Recommendations:     1. Wound:                            Left foot Grade 3 open Lisfranc fracture dislocation (rays 1-5),  Left calcaneus non-displaced anterior process fracture, left talus avulsion fracture, Left 3rd metatarsal base fracture, left foot Grade 2 open left medial malleolus fracture     2. " Labs\Imaging:                          RODRIGUE - Reviewed                          X-Ray(s) - Reviewed     3. Treatment:   Wound Care by Wound Team, LPS to Follow.                          Wound Team to admin leeches every day at 1300 hours     5. Collaboration:                           IV Antibiotics per IM        Anticipated discharge plans (X):  SNF: X           Home Care:           Outpatient Wound Center:            Self Care:            Other:           TBD: X

## 2018-04-08 NOTE — CARE PLAN
Problem: Pain Management  Goal: Pain level will decrease to patient's comfort goal  Outcome: PROGRESSING AS EXPECTED  Pts pain is being well controlled with prn medications    Problem: Mobility  Goal: Risk for activity intolerance will decrease  Outcome: PROGRESSING AS EXPECTED  Pt tolerates getting up and walking to the bathroom well

## 2018-04-08 NOTE — CARE PLAN
Problem: Communication  Goal: The ability to communicate needs accurately and effectively will improve  Outcome: PROGRESSING AS EXPECTED  Pt uses call light appropriately to make needs known.     Problem: Psychosocial Needs:  Goal: Level of anxiety will decrease  Outcome: PROGRESSING AS EXPECTED  Pt is not very anxious, and is adjusting to the fact that he may have his foot or toes amputated.
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
## 2018-04-09 LAB
MAGNESIUM SERPL-MCNC: 2.2 MG/DL (ref 1.5–2.5)
PHOSPHATE SERPL-MCNC: 3.6 MG/DL (ref 2.5–4.5)

## 2018-04-09 PROCEDURE — A9270 NON-COVERED ITEM OR SERVICE: HCPCS | Performed by: SURGERY

## 2018-04-09 PROCEDURE — 700112 HCHG RX REV CODE 229: Performed by: SURGERY

## 2018-04-09 PROCEDURE — 84100 ASSAY OF PHOSPHORUS: CPT

## 2018-04-09 PROCEDURE — A9270 NON-COVERED ITEM OR SERVICE: HCPCS | Performed by: NURSE PRACTITIONER

## 2018-04-09 PROCEDURE — 700102 HCHG RX REV CODE 250 W/ 637 OVERRIDE(OP): Performed by: NURSE PRACTITIONER

## 2018-04-09 PROCEDURE — 700102 HCHG RX REV CODE 250 W/ 637 OVERRIDE(OP): Performed by: SURGERY

## 2018-04-09 PROCEDURE — 770006 HCHG ROOM/CARE - MED/SURG/GYN SEMI*

## 2018-04-09 PROCEDURE — 700102 HCHG RX REV CODE 250 W/ 637 OVERRIDE(OP): Performed by: ORTHOPAEDIC SURGERY

## 2018-04-09 PROCEDURE — 83735 ASSAY OF MAGNESIUM: CPT

## 2018-04-09 PROCEDURE — A9270 NON-COVERED ITEM OR SERVICE: HCPCS | Performed by: ORTHOPAEDIC SURGERY

## 2018-04-09 PROCEDURE — 700111 HCHG RX REV CODE 636 W/ 250 OVERRIDE (IP): Performed by: NURSE PRACTITIONER

## 2018-04-09 PROCEDURE — A9270 NON-COVERED ITEM OR SERVICE: HCPCS | Performed by: PHYSICIAN ASSISTANT

## 2018-04-09 PROCEDURE — 36415 COLL VENOUS BLD VENIPUNCTURE: CPT

## 2018-04-09 PROCEDURE — 700102 HCHG RX REV CODE 250 W/ 637 OVERRIDE(OP): Performed by: PHYSICIAN ASSISTANT

## 2018-04-09 RX ADMIN — OXYCODONE HYDROCHLORIDE 15 MG: 10 TABLET ORAL at 08:54

## 2018-04-09 RX ADMIN — DOCUSATE SODIUM 100 MG: 100 CAPSULE ORAL at 08:54

## 2018-04-09 RX ADMIN — ENOXAPARIN SODIUM 30 MG: 100 INJECTION SUBCUTANEOUS at 19:21

## 2018-04-09 RX ADMIN — GABAPENTIN 100 MG: 100 CAPSULE ORAL at 15:43

## 2018-04-09 RX ADMIN — SULFAMETHOXAZOLE AND TRIMETHOPRIM 1 TABLET: 800; 160 TABLET ORAL at 19:21

## 2018-04-09 RX ADMIN — ENOXAPARIN SODIUM 30 MG: 100 INJECTION SUBCUTANEOUS at 08:54

## 2018-04-09 RX ADMIN — ASPIRIN 81 MG: 81 TABLET, CHEWABLE ORAL at 08:54

## 2018-04-09 RX ADMIN — OXYCODONE HYDROCHLORIDE 15 MG: 10 TABLET ORAL at 05:34

## 2018-04-09 RX ADMIN — OXYCODONE HYDROCHLORIDE 15 MG: 10 TABLET ORAL at 19:21

## 2018-04-09 RX ADMIN — OXYCODONE HYDROCHLORIDE 15 MG: 10 TABLET ORAL at 12:33

## 2018-04-09 RX ADMIN — OXYCODONE HYDROCHLORIDE 15 MG: 10 TABLET ORAL at 02:16

## 2018-04-09 RX ADMIN — MAGNESIUM HYDROXIDE 30 ML: 400 SUSPENSION ORAL at 08:54

## 2018-04-09 RX ADMIN — GABAPENTIN 100 MG: 100 CAPSULE ORAL at 08:54

## 2018-04-09 RX ADMIN — GABAPENTIN 100 MG: 100 CAPSULE ORAL at 19:22

## 2018-04-09 RX ADMIN — SULFAMETHOXAZOLE AND TRIMETHOPRIM 1 TABLET: 800; 160 TABLET ORAL at 08:54

## 2018-04-09 RX ADMIN — OXYCODONE HYDROCHLORIDE 15 MG: 10 TABLET ORAL at 22:46

## 2018-04-09 RX ADMIN — OXYCODONE HYDROCHLORIDE 15 MG: 10 TABLET ORAL at 15:43

## 2018-04-09 ASSESSMENT — PATIENT HEALTH QUESTIONNAIRE - PHQ9
1. LITTLE INTEREST OR PLEASURE IN DOING THINGS: NOT AT ALL
2. FEELING DOWN, DEPRESSED, IRRITABLE, OR HOPELESS: NOT AT ALL
SUM OF ALL RESPONSES TO PHQ9 QUESTIONS 1 AND 2: 0

## 2018-04-09 ASSESSMENT — PAIN SCALES - GENERAL
PAINLEVEL_OUTOF10: 5
PAINLEVEL_OUTOF10: 6
PAINLEVEL_OUTOF10: 5
PAINLEVEL_OUTOF10: 3

## 2018-04-09 NOTE — PROGRESS NOTES
Received shift report from frank RN and assumed care of this pt at 0700. Pt A&O x4, sitting up in bed.  Pt reports pain is 10/10, medicated per MAR. Pt does call appropriately. Pt is standby assist, steady  with FWW. PIV assessed and is patent. Pt is on RA with SaO2 97. POC discussed as well as unit routine, comfort, and safety. Patient has call light and personal belongings within reach. Safety and fall precautions in place. Reviewed orders, notes, labs, and test results. Hourly rounding in place with RN rounding on odd hours and CNA on even hours.

## 2018-04-09 NOTE — CARE PLAN
Problem: Safety  Goal: Will remain free from falls  Outcome: PROGRESSING AS EXPECTED  Pt calls for assistance when wanting to get up    Problem: Pain Management  Goal: Pain level will decrease to patient's comfort goal  Outcome: PROGRESSING AS EXPECTED  Pts pain is being well controlled by pn medications
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
## 2018-04-10 PROCEDURE — 700102 HCHG RX REV CODE 250 W/ 637 OVERRIDE(OP): Performed by: SURGERY

## 2018-04-10 PROCEDURE — A9270 NON-COVERED ITEM OR SERVICE: HCPCS | Performed by: NURSE PRACTITIONER

## 2018-04-10 PROCEDURE — 770006 HCHG ROOM/CARE - MED/SURG/GYN SEMI*

## 2018-04-10 PROCEDURE — A9270 NON-COVERED ITEM OR SERVICE: HCPCS | Performed by: PHYSICIAN ASSISTANT

## 2018-04-10 PROCEDURE — 700102 HCHG RX REV CODE 250 W/ 637 OVERRIDE(OP): Performed by: NURSE PRACTITIONER

## 2018-04-10 PROCEDURE — 700102 HCHG RX REV CODE 250 W/ 637 OVERRIDE(OP): Performed by: ORTHOPAEDIC SURGERY

## 2018-04-10 PROCEDURE — 700111 HCHG RX REV CODE 636 W/ 250 OVERRIDE (IP): Performed by: NURSE PRACTITIONER

## 2018-04-10 PROCEDURE — A9270 NON-COVERED ITEM OR SERVICE: HCPCS | Performed by: ORTHOPAEDIC SURGERY

## 2018-04-10 PROCEDURE — A9270 NON-COVERED ITEM OR SERVICE: HCPCS | Performed by: SURGERY

## 2018-04-10 PROCEDURE — 700102 HCHG RX REV CODE 250 W/ 637 OVERRIDE(OP): Performed by: PHYSICIAN ASSISTANT

## 2018-04-10 PROCEDURE — 97116 GAIT TRAINING THERAPY: CPT

## 2018-04-10 PROCEDURE — 97535 SELF CARE MNGMENT TRAINING: CPT

## 2018-04-10 PROCEDURE — 97530 THERAPEUTIC ACTIVITIES: CPT

## 2018-04-10 RX ORDER — LORAZEPAM 0.5 MG/1
.5-1 TABLET ORAL EVERY 4 HOURS PRN
Status: DISCONTINUED | OUTPATIENT
Start: 2018-04-10 | End: 2018-04-12

## 2018-04-10 RX ADMIN — ASPIRIN 81 MG: 81 TABLET, CHEWABLE ORAL at 09:05

## 2018-04-10 RX ADMIN — MAGNESIUM HYDROXIDE 30 ML: 400 SUSPENSION ORAL at 09:06

## 2018-04-10 RX ADMIN — ACETAMINOPHEN 650 MG: 325 TABLET, FILM COATED ORAL at 05:49

## 2018-04-10 RX ADMIN — ACETAMINOPHEN 650 MG: 325 TABLET, FILM COATED ORAL at 20:33

## 2018-04-10 RX ADMIN — OXYCODONE HYDROCHLORIDE 15 MG: 10 TABLET ORAL at 16:16

## 2018-04-10 RX ADMIN — OXYCODONE HYDROCHLORIDE 15 MG: 10 TABLET ORAL at 01:52

## 2018-04-10 RX ADMIN — GABAPENTIN 100 MG: 100 CAPSULE ORAL at 20:25

## 2018-04-10 RX ADMIN — ACETAMINOPHEN 650 MG: 325 TABLET, FILM COATED ORAL at 01:54

## 2018-04-10 RX ADMIN — OXYCODONE HYDROCHLORIDE 15 MG: 10 TABLET ORAL at 12:16

## 2018-04-10 RX ADMIN — OXYCODONE HYDROCHLORIDE 15 MG: 10 TABLET ORAL at 09:04

## 2018-04-10 RX ADMIN — ENOXAPARIN SODIUM 30 MG: 100 INJECTION SUBCUTANEOUS at 09:06

## 2018-04-10 RX ADMIN — GABAPENTIN 100 MG: 100 CAPSULE ORAL at 09:06

## 2018-04-10 RX ADMIN — OXYCODONE HYDROCHLORIDE 15 MG: 10 TABLET ORAL at 23:20

## 2018-04-10 RX ADMIN — SULFAMETHOXAZOLE AND TRIMETHOPRIM 1 TABLET: 800; 160 TABLET ORAL at 09:06

## 2018-04-10 RX ADMIN — SULFAMETHOXAZOLE AND TRIMETHOPRIM 1 TABLET: 800; 160 TABLET ORAL at 20:25

## 2018-04-10 RX ADMIN — GABAPENTIN 100 MG: 100 CAPSULE ORAL at 16:16

## 2018-04-10 RX ADMIN — OXYCODONE HYDROCHLORIDE 15 MG: 10 TABLET ORAL at 19:44

## 2018-04-10 RX ADMIN — OXYCODONE HYDROCHLORIDE 15 MG: 10 TABLET ORAL at 05:49

## 2018-04-10 ASSESSMENT — PAIN SCALES - GENERAL
PAINLEVEL_OUTOF10: 3
PAINLEVEL_OUTOF10: 8
PAINLEVEL_OUTOF10: 4
PAINLEVEL_OUTOF10: 4
PAINLEVEL_OUTOF10: 8
PAINLEVEL_OUTOF10: 4
PAINLEVEL_OUTOF10: 7
PAINLEVEL_OUTOF10: 5
PAINLEVEL_OUTOF10: 7
PAINLEVEL_OUTOF10: 5
PAINLEVEL_OUTOF10: 6

## 2018-04-10 ASSESSMENT — GAIT ASSESSMENTS
DISTANCE (FEET): 150
DEVIATION: ANTALGIC;OTHER (COMMENT)
ASSISTIVE DEVICE: FRONT WHEEL WALKER
GAIT LEVEL OF ASSIST: STAND BY ASSIST

## 2018-04-10 ASSESSMENT — COGNITIVE AND FUNCTIONAL STATUS - GENERAL
SUGGESTED CMS G CODE MODIFIER MOBILITY: CK
MOBILITY SCORE: 19
DAILY ACTIVITIY SCORE: 24
CLIMB 3 TO 5 STEPS WITH RAILING: A LOT
SUGGESTED CMS G CODE MODIFIER DAILY ACTIVITY: CH
WALKING IN HOSPITAL ROOM: A LITTLE
STANDING UP FROM CHAIR USING ARMS: A LITTLE
MOVING FROM LYING ON BACK TO SITTING ON SIDE OF FLAT BED: A LITTLE

## 2018-04-10 NOTE — PROGRESS NOTES
"Seen and examined.  In OK spirits.  Notes his foot has worsened.    Blood pressure 115/65, pulse 68, temperature 36.1 °C (96.9 °F), resp. rate 18, height 1.854 m (6' 1\"), weight 92.2 kg (203 lb 4.2 oz), SpO2 95 %.      Exam:  Dry gangrenous changes to second toe, first toe appears to be progressing.  Large heel and lateral hindfoot eschar, dorsal eschar and skin necrosis along 1st ray.  Medial area of wound necrosis and gangrenous changes.    #1 Left open Lisfranc fracture dislocation s/p I&D, pinning, closure    Plan:  - Dr. Herbert to visit with patient for 2nd opinion.  Patient realized amputation is likely at this point, but would like to hear from another physician as well.  - No change in care pending decision on amputation  "

## 2018-04-10 NOTE — THERAPY
Per OVALLE, pt has met OT goals in this setting, but may undergo BKA. Will not actively follow pt for OT at this time, but will monitor for DC needs and for potential BKA (will resume OT if new orders received post op).

## 2018-04-10 NOTE — PROGRESS NOTES
VSS, UOA, pain controlled moderately well with tylenol and oxycodone, some numbness to LLE, decent strength in both legs.

## 2018-04-10 NOTE — THERAPY
"Physical Therapy Treatment completed.   Bed Mobility:  Supine to Sit: Modified Independent  Transfers: Sit to Stand: Stand by Assist  Gait: Level Of Assist: Stand by Assist with Front-Wheel Walker       Plan of Care: Will benefit from Physical Therapy 3 times per week  Discharge Recommendations: Equipment: Will Continue to Assess for Equipment Needs.    See \"Rehab Therapy-Acute\" Patient Summary Report for complete documentation.     Pt still continues to improve w/ functional mobility w/ the FWW. Pt is steady w/ the FWW however still demonstrates impulsive and unsafe behaviors especially when fatigued. When fatigued, pt rushes back to room and used his FWW to push a chair out of the way and had a minor LOB. Discussed beginning stair training w/ pt not wanting to progress until he knows for sure the plan for his L foot. Advised pt he will still need same training w/ or w/out the foot. Pt stating \"he will see where it goes.\" As per initial eval, pt will benefit from post acute therapy.  "

## 2018-04-10 NOTE — DISCHARGE PLANNING
Medical Social Work    Sister in-law called [employer] and requested that someone help pt fill out paperwork for [employer] application. SW looked in Media tab and the [employer] application was faxed yesterday. [employer]na stated that pt needs help getting his bank statements and pay-stubs.    ELI will follow up and see what pt needs to do to complete application.

## 2018-04-10 NOTE — CARE PLAN
Problem: Safety  Goal: Will remain free from injury  Outcome: PROGRESSING AS EXPECTED  Treaded socks in place, bed in the lowest position, call light and belongings within reach, pt call for assistance appropriately    Problem: Pain Management  Goal: Pain level will decrease to patient's comfort goal  Outcome: PROGRESSING AS EXPECTED  Pt. Taking oxy 15mg with adequate pain control.    Problem: Mobility  Goal: Risk for activity intolerance will decrease  Outcome: PROGRESSING AS EXPECTED  Pt. Up to bathroom stand by assist with FWW.

## 2018-04-10 NOTE — PROGRESS NOTES
Patient willing to move forward with amp tomorrow   NPO after midnight   Ativan 0.5-1mg po Q4 for anxiety added

## 2018-04-10 NOTE — THERAPY
"Occupational Therapy Treatment completed with focus on ADLs, ADL transfers and patient education.  Functional Status:  Pt seen for OT tx. Pt consistently gets up w/ nrsg staff during the day. Discussed LB dressing and role of OT w/ pt. Pt is able to physically demonstrate ability to complete LB dressing. Pt is up w/ FWW and supv for functional mobility and ADL transfers. Pt however requires close supv d/t pt being impulsive. Pt does move quickly and become impulsive when fatigued d/t rushing to get BTB. Pt concerned about possible LLE amp and awaiting discussion from MD.   Plan of Care: Pt has currently met goals. Pt pending possible BKA of LLE. Pt awaiting second opinion. Will discuss POC and goals w/ OTR.   Discharge Recommendations:  Equipment Will Continue to Assess for Equipment Needs.     See \"Rehab Therapy-Acute\" Patient Summary Report for complete documentation.   "

## 2018-04-11 PROCEDURE — 700102 HCHG RX REV CODE 250 W/ 637 OVERRIDE(OP): Performed by: SURGERY

## 2018-04-11 PROCEDURE — 500891 HCHG PACK, ORTHO MAJOR: Performed by: ORTHOPAEDIC SURGERY

## 2018-04-11 PROCEDURE — A9270 NON-COVERED ITEM OR SERVICE: HCPCS | Performed by: ORTHOPAEDIC SURGERY

## 2018-04-11 PROCEDURE — 160022 HCHG BLOCK: Performed by: ORTHOPAEDIC SURGERY

## 2018-04-11 PROCEDURE — 160009 HCHG ANES TIME/MIN: Performed by: ORTHOPAEDIC SURGERY

## 2018-04-11 PROCEDURE — 700102 HCHG RX REV CODE 250 W/ 637 OVERRIDE(OP): Performed by: PHYSICIAN ASSISTANT

## 2018-04-11 PROCEDURE — A9270 NON-COVERED ITEM OR SERVICE: HCPCS | Performed by: SURGERY

## 2018-04-11 PROCEDURE — 0Y6J0Z2 DETACHMENT AT LEFT LOWER LEG, MID, OPEN APPROACH: ICD-10-PCS | Performed by: ORTHOPAEDIC SURGERY

## 2018-04-11 PROCEDURE — 88307 TISSUE EXAM BY PATHOLOGIST: CPT

## 2018-04-11 PROCEDURE — 700102 HCHG RX REV CODE 250 W/ 637 OVERRIDE(OP): Performed by: ORTHOPAEDIC SURGERY

## 2018-04-11 PROCEDURE — 700105 HCHG RX REV CODE 258

## 2018-04-11 PROCEDURE — 700111 HCHG RX REV CODE 636 W/ 250 OVERRIDE (IP)

## 2018-04-11 PROCEDURE — A9270 NON-COVERED ITEM OR SERVICE: HCPCS | Performed by: PHYSICIAN ASSISTANT

## 2018-04-11 PROCEDURE — 700112 HCHG RX REV CODE 229: Performed by: SURGERY

## 2018-04-11 PROCEDURE — 88311 DECALCIFY TISSUE: CPT

## 2018-04-11 PROCEDURE — 500367 HCHG DRAIN KIT, HEMOVAC: Performed by: ORTHOPAEDIC SURGERY

## 2018-04-11 PROCEDURE — A9270 NON-COVERED ITEM OR SERVICE: HCPCS

## 2018-04-11 PROCEDURE — 770006 HCHG ROOM/CARE - MED/SURG/GYN SEMI*

## 2018-04-11 PROCEDURE — 501838 HCHG SUTURE GENERAL: Performed by: ORTHOPAEDIC SURGERY

## 2018-04-11 PROCEDURE — L1830 KO IMMOB CANVAS LONG PRE OTS: HCPCS | Performed by: ORTHOPAEDIC SURGERY

## 2018-04-11 PROCEDURE — 160035 HCHG PACU - 1ST 60 MINS PHASE I: Performed by: ORTHOPAEDIC SURGERY

## 2018-04-11 PROCEDURE — 500440 HCHG DRESSING, STERILE ROLL (KERLIX): Performed by: ORTHOPAEDIC SURGERY

## 2018-04-11 PROCEDURE — 160028 HCHG SURGERY MINUTES - 1ST 30 MINS LEVEL 3: Performed by: ORTHOPAEDIC SURGERY

## 2018-04-11 PROCEDURE — 160002 HCHG RECOVERY MINUTES (STAT): Performed by: ORTHOPAEDIC SURGERY

## 2018-04-11 PROCEDURE — 160036 HCHG PACU - EA ADDL 30 MINS PHASE I: Performed by: ORTHOPAEDIC SURGERY

## 2018-04-11 PROCEDURE — 700102 HCHG RX REV CODE 250 W/ 637 OVERRIDE(OP)

## 2018-04-11 PROCEDURE — 160048 HCHG OR STATISTICAL LEVEL 1-5: Performed by: ORTHOPAEDIC SURGERY

## 2018-04-11 PROCEDURE — 700101 HCHG RX REV CODE 250

## 2018-04-11 PROCEDURE — 160039 HCHG SURGERY MINUTES - EA ADDL 1 MIN LEVEL 3: Performed by: ORTHOPAEDIC SURGERY

## 2018-04-11 PROCEDURE — A6223 GAUZE >16<=48 NO W/SAL W/O B: HCPCS | Performed by: ORTHOPAEDIC SURGERY

## 2018-04-11 PROCEDURE — 700111 HCHG RX REV CODE 636 W/ 250 OVERRIDE (IP): Performed by: ORTHOPAEDIC SURGERY

## 2018-04-11 RX ORDER — CEFAZOLIN SODIUM 2 G/100ML
2 INJECTION, SOLUTION INTRAVENOUS EVERY 8 HOURS
Status: COMPLETED | OUTPATIENT
Start: 2018-04-11 | End: 2018-04-12

## 2018-04-11 RX ORDER — OXYCODONE HCL 5 MG/5 ML
SOLUTION, ORAL ORAL
Status: COMPLETED
Start: 2018-04-11 | End: 2018-04-11

## 2018-04-11 RX ORDER — MIDAZOLAM HYDROCHLORIDE 1 MG/ML
INJECTION INTRAMUSCULAR; INTRAVENOUS
Status: COMPLETED
Start: 2018-04-11 | End: 2018-04-11

## 2018-04-11 RX ORDER — SODIUM CHLORIDE 9 MG/ML
INJECTION, SOLUTION INTRAVENOUS
Status: COMPLETED
Start: 2018-04-11 | End: 2018-04-11

## 2018-04-11 RX ADMIN — FENTANYL CITRATE 75 MCG: 50 INJECTION, SOLUTION INTRAMUSCULAR; INTRAVENOUS at 20:00

## 2018-04-11 RX ADMIN — DOCUSATE SODIUM 100 MG: 100 CAPSULE ORAL at 22:22

## 2018-04-11 RX ADMIN — CEFAZOLIN SODIUM 2 G: 2 INJECTION, SOLUTION INTRAVENOUS at 22:22

## 2018-04-11 RX ADMIN — GABAPENTIN 100 MG: 100 CAPSULE ORAL at 22:22

## 2018-04-11 RX ADMIN — SULFAMETHOXAZOLE AND TRIMETHOPRIM 1 TABLET: 800; 160 TABLET ORAL at 09:03

## 2018-04-11 RX ADMIN — OXYCODONE HYDROCHLORIDE 15 MG: 10 TABLET ORAL at 06:03

## 2018-04-11 RX ADMIN — ACETAMINOPHEN 650 MG: 325 TABLET, FILM COATED ORAL at 23:23

## 2018-04-11 RX ADMIN — HYDROMORPHONE HYDROCHLORIDE 0.5 MG: 10 INJECTION, SOLUTION INTRAMUSCULAR; INTRAVENOUS; SUBCUTANEOUS at 20:50

## 2018-04-11 RX ADMIN — GABAPENTIN 100 MG: 100 CAPSULE ORAL at 15:04

## 2018-04-11 RX ADMIN — ACETAMINOPHEN 650 MG: 325 TABLET, FILM COATED ORAL at 04:28

## 2018-04-11 RX ADMIN — OXYCODONE HYDROCHLORIDE 15 MG: 10 TABLET ORAL at 12:07

## 2018-04-11 RX ADMIN — MIDAZOLAM 1 MG: 1 INJECTION INTRAMUSCULAR; INTRAVENOUS at 20:15

## 2018-04-11 RX ADMIN — FENTANYL CITRATE 50 MCG: 50 INJECTION, SOLUTION INTRAMUSCULAR; INTRAVENOUS at 20:45

## 2018-04-11 RX ADMIN — OXYCODONE HYDROCHLORIDE 10 MG: 5 SOLUTION ORAL at 20:00

## 2018-04-11 RX ADMIN — HYDROMORPHONE HYDROCHLORIDE 0.5 MG: 10 INJECTION, SOLUTION INTRAMUSCULAR; INTRAVENOUS; SUBCUTANEOUS at 21:00

## 2018-04-11 RX ADMIN — MIDAZOLAM 1 MG: 1 INJECTION INTRAMUSCULAR; INTRAVENOUS at 20:00

## 2018-04-11 RX ADMIN — SULFAMETHOXAZOLE AND TRIMETHOPRIM 1 TABLET: 800; 160 TABLET ORAL at 22:22

## 2018-04-11 RX ADMIN — OXYCODONE HYDROCHLORIDE 10 MG: 10 TABLET ORAL at 22:22

## 2018-04-11 RX ADMIN — HYDROMORPHONE HYDROCHLORIDE 0.5 MG: 10 INJECTION, SOLUTION INTRAMUSCULAR; INTRAVENOUS; SUBCUTANEOUS at 20:55

## 2018-04-11 RX ADMIN — OXYCODONE HYDROCHLORIDE 15 MG: 10 TABLET ORAL at 03:16

## 2018-04-11 RX ADMIN — OXYCODONE HYDROCHLORIDE 15 MG: 10 TABLET ORAL at 09:03

## 2018-04-11 RX ADMIN — OXYCODONE HYDROCHLORIDE 15 MG: 10 TABLET ORAL at 15:04

## 2018-04-11 RX ADMIN — FENTANYL CITRATE 75 MCG: 50 INJECTION, SOLUTION INTRAMUSCULAR; INTRAVENOUS at 20:15

## 2018-04-11 RX ADMIN — SODIUM CHLORIDE 500 ML: 9 INJECTION, SOLUTION INTRAVENOUS at 22:19

## 2018-04-11 RX ADMIN — GABAPENTIN 100 MG: 100 CAPSULE ORAL at 09:03

## 2018-04-11 RX ADMIN — FENTANYL CITRATE 50 MCG: 50 INJECTION, SOLUTION INTRAMUSCULAR; INTRAVENOUS at 20:30

## 2018-04-11 RX ADMIN — HYDROMORPHONE HYDROCHLORIDE 0.5 MG: 10 INJECTION, SOLUTION INTRAMUSCULAR; INTRAVENOUS; SUBCUTANEOUS at 20:45

## 2018-04-11 ASSESSMENT — PAIN SCALES - GENERAL
PAINLEVEL_OUTOF10: 8
PAINLEVEL_OUTOF10: 8
PAINLEVEL_OUTOF10: 5
PAINLEVEL_OUTOF10: 8
PAINLEVEL_OUTOF10: 10
PAINLEVEL_OUTOF10: 8
PAINLEVEL_OUTOF10: 8
PAINLEVEL_OUTOF10: 6
PAINLEVEL_OUTOF10: 8
PAINLEVEL_OUTOF10: 10
PAINLEVEL_OUTOF10: 6
PAINLEVEL_OUTOF10: 4
PAINLEVEL_OUTOF10: 6
PAINLEVEL_OUTOF10: 10
PAINLEVEL_OUTOF10: 6
PAINLEVEL_OUTOF10: 5
PAINLEVEL_OUTOF10: 6

## 2018-04-11 NOTE — PROGRESS NOTES
Hourly rounding, call bell within reach. Patient educated about plan of care, pain management, call bell use, and mobility. Patient demonstrates good mobility up to the bathroom and in the halls using the walker and stand by assist. Sister at bedside, participating in care. Patient npo for surgery this afternoon. Patient still expresses anxiety about surgery this afternoon but states he knows he needs it.

## 2018-04-11 NOTE — PROGRESS NOTES
"Seen and examined.  Visited with patient again.  Dr. Herbert had the opportunity to evaluate the patient and discuss with him yesterday as well.  All are in agreement with plan for BKA at this time due to nonviable soft tissues of left foot.    Blood pressure 101/65, pulse 84, temperature 37.1 °C (98.7 °F), resp. rate 17, height 1.854 m (6' 1\"), weight 92.2 kg (203 lb 4.2 oz), SpO2 90 %.      Exam:  Skin necrosis, eschar, and dry gangrene of medial forefoot/midfoot, dorsal forefoot, 1st/2nd toes, heel, and lateral ankle.    #1 Left open lisfranc fracture dislocation    Plan:  - To OR today for L BKA  - NPO  "

## 2018-04-11 NOTE — CARE PLAN
Problem: Communication  Goal: The ability to communicate needs accurately and effectively will improve  Outcome: PROGRESSING AS EXPECTED  A&Ox4. Pt very anxious tonight about L BKA surgery tomorrow, denies wanting ativan. Given oxy q 3 for LLE pain. Splint/ace wrap in place. NPO at midnight. Call light in reach, Will continue to monitor.     Problem: Safety  Goal: Will remain free from injury  Outcome: PROGRESSING AS EXPECTED      Problem: Infection  Goal: Will remain free from infection  Outcome: PROGRESSING AS EXPECTED      Problem: Discharge Barriers/Planning  Goal: Patient's continuum of care needs will be met  Outcome: PROGRESSING AS EXPECTED

## 2018-04-12 LAB
MAGNESIUM SERPL-MCNC: 2 MG/DL (ref 1.5–2.5)
PHOSPHATE SERPL-MCNC: 3.4 MG/DL (ref 2.5–4.5)

## 2018-04-12 PROCEDURE — 700102 HCHG RX REV CODE 250 W/ 637 OVERRIDE(OP): Performed by: PHYSICIAN ASSISTANT

## 2018-04-12 PROCEDURE — 700102 HCHG RX REV CODE 250 W/ 637 OVERRIDE(OP): Performed by: SURGERY

## 2018-04-12 PROCEDURE — A9270 NON-COVERED ITEM OR SERVICE: HCPCS | Performed by: NURSE PRACTITIONER

## 2018-04-12 PROCEDURE — G8978 MOBILITY CURRENT STATUS: HCPCS | Mod: CJ

## 2018-04-12 PROCEDURE — 36415 COLL VENOUS BLD VENIPUNCTURE: CPT

## 2018-04-12 PROCEDURE — 700111 HCHG RX REV CODE 636 W/ 250 OVERRIDE (IP): Performed by: PHYSICIAN ASSISTANT

## 2018-04-12 PROCEDURE — G8987 SELF CARE CURRENT STATUS: HCPCS | Mod: CK

## 2018-04-12 PROCEDURE — 700102 HCHG RX REV CODE 250 W/ 637 OVERRIDE(OP): Performed by: ORTHOPAEDIC SURGERY

## 2018-04-12 PROCEDURE — 97164 PT RE-EVAL EST PLAN CARE: CPT

## 2018-04-12 PROCEDURE — A9270 NON-COVERED ITEM OR SERVICE: HCPCS | Performed by: PHYSICIAN ASSISTANT

## 2018-04-12 PROCEDURE — A9270 NON-COVERED ITEM OR SERVICE: HCPCS | Performed by: SURGERY

## 2018-04-12 PROCEDURE — 83735 ASSAY OF MAGNESIUM: CPT

## 2018-04-12 PROCEDURE — G8988 SELF CARE GOAL STATUS: HCPCS | Mod: CI

## 2018-04-12 PROCEDURE — A9270 NON-COVERED ITEM OR SERVICE: HCPCS | Performed by: ORTHOPAEDIC SURGERY

## 2018-04-12 PROCEDURE — 700112 HCHG RX REV CODE 229: Performed by: SURGERY

## 2018-04-12 PROCEDURE — 84100 ASSAY OF PHOSPHORUS: CPT

## 2018-04-12 PROCEDURE — G8979 MOBILITY GOAL STATUS: HCPCS | Mod: CI

## 2018-04-12 PROCEDURE — 700111 HCHG RX REV CODE 636 W/ 250 OVERRIDE (IP): Performed by: ORTHOPAEDIC SURGERY

## 2018-04-12 PROCEDURE — 770006 HCHG ROOM/CARE - MED/SURG/GYN SEMI*

## 2018-04-12 PROCEDURE — 700102 HCHG RX REV CODE 250 W/ 637 OVERRIDE(OP): Performed by: NURSE PRACTITIONER

## 2018-04-12 PROCEDURE — 700111 HCHG RX REV CODE 636 W/ 250 OVERRIDE (IP): Performed by: NURSE PRACTITIONER

## 2018-04-12 RX ORDER — MORPHINE SULFATE 4 MG/ML
4 INJECTION, SOLUTION INTRAMUSCULAR; INTRAVENOUS
Status: DISCONTINUED | OUTPATIENT
Start: 2018-04-12 | End: 2018-04-12

## 2018-04-12 RX ORDER — PREGABALIN 75 MG/1
150 CAPSULE ORAL 3 TIMES DAILY
Status: DISCONTINUED | OUTPATIENT
Start: 2018-04-12 | End: 2018-04-17 | Stop reason: HOSPADM

## 2018-04-12 RX ORDER — DIAZEPAM 5 MG/1
5 TABLET ORAL EVERY 6 HOURS PRN
Status: DISCONTINUED | OUTPATIENT
Start: 2018-04-12 | End: 2018-04-17 | Stop reason: HOSPADM

## 2018-04-12 RX ADMIN — SULFAMETHOXAZOLE AND TRIMETHOPRIM 1 TABLET: 800; 160 TABLET ORAL at 09:48

## 2018-04-12 RX ADMIN — OXYCODONE HYDROCHLORIDE 15 MG: 10 TABLET ORAL at 12:58

## 2018-04-12 RX ADMIN — DOCUSATE SODIUM 100 MG: 100 CAPSULE ORAL at 09:48

## 2018-04-12 RX ADMIN — CEFAZOLIN SODIUM 2 G: 2 INJECTION, SOLUTION INTRAVENOUS at 03:56

## 2018-04-12 RX ADMIN — OXYCODONE HYDROCHLORIDE 15 MG: 10 TABLET ORAL at 01:26

## 2018-04-12 RX ADMIN — MORPHINE SULFATE 4 MG: 4 INJECTION INTRAVENOUS at 15:26

## 2018-04-12 RX ADMIN — ACETAMINOPHEN 650 MG: 325 TABLET, FILM COATED ORAL at 03:56

## 2018-04-12 RX ADMIN — ACETAMINOPHEN 650 MG: 325 TABLET, FILM COATED ORAL at 20:02

## 2018-04-12 RX ADMIN — PREGABALIN 150 MG: 75 CAPSULE ORAL at 17:51

## 2018-04-12 RX ADMIN — DOCUSATE SODIUM 100 MG: 100 CAPSULE ORAL at 20:03

## 2018-04-12 RX ADMIN — DIAZEPAM 5 MG: 5 TABLET ORAL at 17:51

## 2018-04-12 RX ADMIN — ENOXAPARIN SODIUM 30 MG: 100 INJECTION SUBCUTANEOUS at 09:55

## 2018-04-12 RX ADMIN — PREGABALIN 150 MG: 75 CAPSULE ORAL at 22:57

## 2018-04-12 RX ADMIN — ENOXAPARIN SODIUM 30 MG: 100 INJECTION SUBCUTANEOUS at 20:02

## 2018-04-12 RX ADMIN — ASPIRIN 81 MG: 81 TABLET, CHEWABLE ORAL at 09:49

## 2018-04-12 RX ADMIN — OXYCODONE HYDROCHLORIDE 15 MG: 10 TABLET ORAL at 05:06

## 2018-04-12 RX ADMIN — OXYCODONE HYDROCHLORIDE 15 MG: 10 TABLET ORAL at 20:02

## 2018-04-12 RX ADMIN — MAGNESIUM HYDROXIDE 30 ML: 400 SUSPENSION ORAL at 09:49

## 2018-04-12 RX ADMIN — STANDARDIZED SENNA CONCENTRATE AND DOCUSATE SODIUM 1 TABLET: 8.6; 5 TABLET, FILM COATED ORAL at 20:03

## 2018-04-12 RX ADMIN — OXYCODONE HYDROCHLORIDE 15 MG: 10 TABLET ORAL at 09:47

## 2018-04-12 RX ADMIN — GABAPENTIN 100 MG: 100 CAPSULE ORAL at 09:48

## 2018-04-12 RX ADMIN — OXYCODONE HYDROCHLORIDE 15 MG: 10 TABLET ORAL at 22:57

## 2018-04-12 RX ADMIN — GABAPENTIN 100 MG: 100 CAPSULE ORAL at 15:25

## 2018-04-12 RX ADMIN — OXYCODONE HYDROCHLORIDE 15 MG: 10 TABLET ORAL at 16:49

## 2018-04-12 ASSESSMENT — PATIENT HEALTH QUESTIONNAIRE - PHQ9
2. FEELING DOWN, DEPRESSED, IRRITABLE, OR HOPELESS: NOT AT ALL
1. LITTLE INTEREST OR PLEASURE IN DOING THINGS: NOT AT ALL
SUM OF ALL RESPONSES TO PHQ9 QUESTIONS 1 AND 2: 0

## 2018-04-12 ASSESSMENT — GAIT ASSESSMENTS
DEVIATION: STEP TO;ANTALGIC
DISTANCE (FEET): 15
GAIT LEVEL OF ASSIST: MINIMAL ASSIST
ASSISTIVE DEVICE: FRONT WHEEL WALKER

## 2018-04-12 ASSESSMENT — COGNITIVE AND FUNCTIONAL STATUS - GENERAL
STANDING UP FROM CHAIR USING ARMS: A LOT
MOVING TO AND FROM BED TO CHAIR: UNABLE
PERSONAL GROOMING: A LITTLE
TOILETING: A LITTLE
WALKING IN HOSPITAL ROOM: A LITTLE
HELP NEEDED FOR BATHING: A LITTLE
MOBILITY SCORE: 11
SUGGESTED CMS G CODE MODIFIER DAILY ACTIVITY: CJ
CLIMB 3 TO 5 STEPS WITH RAILING: TOTAL
MOVING FROM LYING ON BACK TO SITTING ON SIDE OF FLAT BED: UNABLE
DRESSING REGULAR LOWER BODY CLOTHING: A LITTLE
SUGGESTED CMS G CODE MODIFIER MOBILITY: CL
DAILY ACTIVITIY SCORE: 20
TURNING FROM BACK TO SIDE WHILE IN FLAT BAD: A LITTLE

## 2018-04-12 ASSESSMENT — PAIN SCALES - GENERAL
PAINLEVEL_OUTOF10: 9
PAINLEVEL_OUTOF10: 9
PAINLEVEL_OUTOF10: 4
PAINLEVEL_OUTOF10: 5
PAINLEVEL_OUTOF10: 6
PAINLEVEL_OUTOF10: 4
PAINLEVEL_OUTOF10: 7
PAINLEVEL_OUTOF10: 7
PAINLEVEL_OUTOF10: 6
PAINLEVEL_OUTOF10: 6
PAINLEVEL_OUTOF10: 8

## 2018-04-12 ASSESSMENT — LIFESTYLE VARIABLES: DO YOU DRINK ALCOHOL: NO

## 2018-04-12 NOTE — CARE PLAN
Problem: Pain Management  Goal: Pain level will decrease to patient's comfort goal  Outcome: PROGRESSING AS EXPECTED  Administered pain medications per MAR, teach non pharmacological techniques such as relaxation, imagery    Problem: Skin Integrity  Goal: Risk for impaired skin integrity will decrease  Outcome: PROGRESSING AS EXPECTED  q2 david, constance risk assessment, applied barrier cream

## 2018-04-12 NOTE — THERAPY
"Occupational Therapy Re-Evaluation completed.   Functional Status:  Pt is s/p new L BKA.  SBA supine to sit.  Min A LB dressing.  Pt stood with CGA and able to walk in room using FWW but required seated rest break 2' to dizziness and pain.  Pt left up in chair.  Pt requires max A to manage immobilizer.  Pt educated on importance of L knee extension, as pt with knees raised on bed upon OT's arrival.  Plan of Care: Will benefit from Occupational Therapy 3 times per week  Discharge Recommendations:  Equipment: Will Continue to Assess for Equipment Needs. Pt will benefit from further rehabilitation at a post acute facility prior to DC home.    See \"Rehab Therapy-Acute\" Patient Summary Report for complete documentation.    "

## 2018-04-12 NOTE — DISCHARGE PLANNING
Anticipated Discharge Disposition: SNF    Action: [facility] [id number]    Barriers to Discharge: Uninsured and SNF not accepting MINDY at this time.    Plan: DC to SNF when medically clear and find accepting SNF.

## 2018-04-12 NOTE — PROGRESS NOTES
Pt was stating 10/10 pain at 1500 in left leg. Call sent to NICOLE Barrow with ortho, about pain medication. RN informed Danial that pt was already taking scheduled gabapentin and oxy 15mg every 3 hours. Pt does not appear in distress or uncomfortable. RN received order for IV morphine. First dose given at 1530. Informed patient that he has to ask for pain medication since it is prn, and the RN will not automatically bring it in when it is due. Will continue to monitor patient's pain and medicate prn.

## 2018-04-12 NOTE — PROGRESS NOTES
Patient returned to unit at 2120, received report from PACU nurse yolanda.  Skin assessment shows no abnormalities.  CMS intact on operated limb, pt denies any numbness or tingling.  Pt complains of pain level of 6 and medicated per MAR.  Pt's pain level is 4 when medicated.  Pt has voided at least 3 times since coming back to unit.

## 2018-04-12 NOTE — OP REPORT
DATE OF OPERATION: 4/11/2018     PREOPERATIVE DIAGNOSIS: left lower extremity failed limb salvage for open Lisfranc fracture dislocatoin    POSTOPERATIVE DIAGNOSIS: Same    PROCEDURE PERFORMED: left below knee amputation    SURGEON: Eric Aguayo M.D.     ASSISTANT: Mason Rodrigez M.D.    ANESTHESIOLOGIST: Lucas Cuba MD.     ANESTHESIA: General    ASA CLASSIFICATION: I    INDICATIONS: The patient is a 62 y.o.-year-old male who presents with a left open Lisfranc fracture dislocation s/p I&D and pinning.  He has undergone attempts at limb salvage, but ultimately, has gone on to necrosis of his dorsal and medial foot skin, 1st and 2nd toes, lateral ankle skin, and posterior heel skin.  He also lacks plantar sensation.  Given that his skin envelope has failed, and he lacks plantar sensation, the patient is an appropriately indicated candidate for left below knee amputation.  I discussed the risks, benefits, and alternatives of surgery with the patient.  Risks discussed included failure to clear the infection, wound healing complication, need for additional surgery including more proximal amputation, neurovascular injury, blood loss, phantom limb sensation or pain, DVT/PE, and the medical risks of anesthesia (including stroke, MI, and death).  Benefits discussed included improved chance of wound healing and improved function.  The patient is in agreement with the plan to proceed, and their informed consent was signed and documented in the medical record.  I met with the patient pre-operatively and marked the operative extremity with their agreement.  He was taken to the operating room.    FINDINGS: Non-viable lower extremity    WOUND CLASSIFICATION: Class III    SPECIMEN: None    ESTIMATED BLOOD LOSS: Minimal    COMPLICATIONS: None    TOURNIQUET TIME: 13 mins at 250 mmHg    PROCEDURE: The patient underwent general anesthesia, and was positioned supine with all bony prominences well padded.  A well padded  non-sterile tourniquet was applied the the operative extremity, and the operative extremity was prepped and draped in sterile orthopaedic fashion, using ChloraPrep.  The surgical team scrubbed in, and a procedural pause was conducted to verify correct patient, correct extremity, presence of the surgeons initials on the operative extremity, and administration of IV antibiotics, in this case, Ancef.    Following generalized agreement, the tourniquet was inflated, and a standard below knee amputation flap was drawn.  The incision was then made, and we dissected through fascia sharply.  The anterior and lateral compartment musculature was then divided with cautery, and the anterior vascular bundle was ligated.  A traction neurolysis was performed on the nerve.    We then elevated the periosteum of the tibia proximally, and performed a transverse osteotomy of the tibia using the saw.  A napkin ring osteotomy of the fibula was performed with the saw, making our proximal cut just proximal to the tibial cut.  The amputation was then completed, and the posterior flap contoured using the amputation knife.  The residual limb was passed off.  The anterior aspect of the tibia was beveled using the saw.    We then dissected out the posterior neurovascular bundle, and ligated the vessels.  A traction neurolysis was performed on the nerve, as well as the sural nerve.  The posterior flap was debulked as needed, and the tourniquet deflated.  Hemostasis was obtained with cautery.  The wound was then irrigated with copious amounts of normal saline.  The wound was then closed over a hemovac drain, with #1 Vicryl in the fascia, a few 2-0 Vicryl in the subcutaneous tissue, and 2-0 Nylon in the skin.  Sterile dressings and a knee immobilizer were applied, and the patient was transported to the recovery room in stable condition, suffering no complications.  All counts were correct.    The use of Dr. Mason Rodrigez as a surgical assistant was  necessary for assistance with amputation and closure.    Post-Operative Plan:    1.  NWB operative extremity, knee immobilizer when at rest  2.  DVT prophylaxis - SCD's, chemical per medicine  3.  Remove drain when output < 30 mL/24 hours  4.  Antibiotics - Per ID recs  5.  Discharge planning     ____________________________________   Eric Aguayo M.D.    DD: 4/11/2018  7:25 PM

## 2018-04-12 NOTE — THERAPY
"Physical Therapy Re-evaluation completed.   Bed Mobility:  Supine to Sit: Stand by Assist  Transfers: Sit to Stand: Contact Guard Assist  Gait: Level Of Assist: Minimal Assist with Front-Wheel Walker       Plan of Care: Will benefit from Physical Therapy 4 times per week  Discharge Recommendations: Equipment: Will Continue to Assess for Equipment Needs. Patient would benefit from Acute Rehab prior to DC home.    See \"Rehab Therapy-Acute\" Patient Summary Report for complete documentation.     "

## 2018-04-13 ENCOUNTER — HOSPITAL ENCOUNTER (INPATIENT)
Facility: REHABILITATION | Age: 63
End: 2018-04-13
Attending: PHYSICAL MEDICINE & REHABILITATION | Admitting: PHYSICAL MEDICINE & REHABILITATION

## 2018-04-13 PROCEDURE — A9270 NON-COVERED ITEM OR SERVICE: HCPCS | Performed by: SURGERY

## 2018-04-13 PROCEDURE — 700102 HCHG RX REV CODE 250 W/ 637 OVERRIDE(OP): Performed by: PHYSICIAN ASSISTANT

## 2018-04-13 PROCEDURE — 700102 HCHG RX REV CODE 250 W/ 637 OVERRIDE(OP): Performed by: SURGERY

## 2018-04-13 PROCEDURE — 700111 HCHG RX REV CODE 636 W/ 250 OVERRIDE (IP): Performed by: NURSE PRACTITIONER

## 2018-04-13 PROCEDURE — 770006 HCHG ROOM/CARE - MED/SURG/GYN SEMI*

## 2018-04-13 PROCEDURE — 700102 HCHG RX REV CODE 250 W/ 637 OVERRIDE(OP): Performed by: NURSE PRACTITIONER

## 2018-04-13 PROCEDURE — A9270 NON-COVERED ITEM OR SERVICE: HCPCS | Performed by: PHYSICIAN ASSISTANT

## 2018-04-13 PROCEDURE — 700112 HCHG RX REV CODE 229: Performed by: SURGERY

## 2018-04-13 PROCEDURE — A9270 NON-COVERED ITEM OR SERVICE: HCPCS | Performed by: NURSE PRACTITIONER

## 2018-04-13 RX ADMIN — PREGABALIN 150 MG: 75 CAPSULE ORAL at 16:15

## 2018-04-13 RX ADMIN — OXYCODONE HYDROCHLORIDE 15 MG: 10 TABLET ORAL at 16:15

## 2018-04-13 RX ADMIN — OXYCODONE HYDROCHLORIDE 15 MG: 10 TABLET ORAL at 03:04

## 2018-04-13 RX ADMIN — ASPIRIN 81 MG: 81 TABLET, CHEWABLE ORAL at 10:00

## 2018-04-13 RX ADMIN — PREGABALIN 150 MG: 75 CAPSULE ORAL at 10:00

## 2018-04-13 RX ADMIN — ACETAMINOPHEN 650 MG: 325 TABLET, FILM COATED ORAL at 03:03

## 2018-04-13 RX ADMIN — OXYCODONE HYDROCHLORIDE 15 MG: 10 TABLET ORAL at 22:39

## 2018-04-13 RX ADMIN — DOCUSATE SODIUM 100 MG: 100 CAPSULE ORAL at 10:00

## 2018-04-13 RX ADMIN — OXYCODONE HYDROCHLORIDE 15 MG: 10 TABLET ORAL at 10:00

## 2018-04-13 RX ADMIN — OXYCODONE HYDROCHLORIDE 15 MG: 10 TABLET ORAL at 12:46

## 2018-04-13 RX ADMIN — OXYCODONE HYDROCHLORIDE 15 MG: 10 TABLET ORAL at 19:04

## 2018-04-13 RX ADMIN — PREGABALIN 150 MG: 75 CAPSULE ORAL at 21:49

## 2018-04-13 RX ADMIN — ENOXAPARIN SODIUM 30 MG: 100 INJECTION SUBCUTANEOUS at 10:00

## 2018-04-13 RX ADMIN — ACETAMINOPHEN 650 MG: 325 TABLET, FILM COATED ORAL at 11:24

## 2018-04-13 RX ADMIN — ENOXAPARIN SODIUM 30 MG: 100 INJECTION SUBCUTANEOUS at 21:50

## 2018-04-13 RX ADMIN — DIAZEPAM 5 MG: 5 TABLET ORAL at 11:24

## 2018-04-13 RX ADMIN — OXYCODONE HYDROCHLORIDE 15 MG: 10 TABLET ORAL at 06:41

## 2018-04-13 ASSESSMENT — PAIN SCALES - GENERAL
PAINLEVEL_OUTOF10: 3
PAINLEVEL_OUTOF10: 5
PAINLEVEL_OUTOF10: 6
PAINLEVEL_OUTOF10: 5
PAINLEVEL_OUTOF10: 4

## 2018-04-13 NOTE — PROGRESS NOTES
"Pt removed splint from left leg indicating \"doctor told him he could\". Pt in bed, attempting to straighten L knee indicating he was worried about knee healing in flexion. Nursing student recommended pt rest as he has been in pain and assisted pt with putting splint back on. Orthopedic surgeon told pt he could take splint off while OOB, but needed to keep the splint on while in bed and while sleeping.  "
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
## 2018-04-13 NOTE — DISCHARGE PLANNING
Thank you for the opportunity to assist with this patient as they transition to post acute services.  We are aware of the Rehab referral from [facility].  Dr. Putnam to consult this referral. Our Transitional Case Coordinator will follow. At this time patient is showing to have Medical ([facility] not contracted) as their coverage.   Please do not hesitate to call us if you require additional assistance my phone number is [phone] Dima.

## 2018-04-13 NOTE — PROGRESS NOTES
Pt's dressing is CDI.  CMS is intact except for temperature above the left knee is warm to touch.  Unable to assess left popliteal pulse due to dressing in place.  Pt's pain level has improved with Lyrica per patient report.  Pt continues to take oxy 15 mg q 3 hrs.  Pt rates pain level at 6 once medication has taken effect.  Patient was able to sleep and rest during night shift.

## 2018-04-13 NOTE — PROCEDURES
Procedure note    Date of procedure: 3/27/2018    Preoperative diagnosis: Open unstable fracture of left foot    Postop diagnosis: Same    Procedure was left lower extremity splint placement    Indications: Unstable open fracture of left foot.    Procedure: Patient was seen in the trauma bay and in the course of trauma evaluation medial left foot wound with exposed midfoot fracture dislocation was identified. The foot was stabilized with manual compression while the orthopedic tech and PA placed a dressing over the wound. They then placed stabilizing foot splint was then placed and maintained in position with an Ace wrap.

## 2018-04-13 NOTE — PROGRESS NOTES
"S:  Seen and examined.  POD #1 s/p L BKA.  Doing well this morning.  No new complaints, pain controlled.  Happy he made this decision.    O: Blood pressure 126/74, pulse 88, temperature 37.3 °C (99.2 °F), resp. rate 16, height 1.854 m (6' 1\"), weight 92.2 kg (203 lb 4.2 oz), SpO2 95 %..    Intake/Output Summary (Last 24 hours) at 04/12/18 8687  Last data filed at 04/12/18 1918   Gross per 24 hour   Intake             1280 ml   Output             1750 ml   Net             -470 ml   .    Operative/injured extremity examined.  Minimal drain output.  Wound dressing c/d/i          A/P:    POD #1 s/p L BKA    Antibiotics: None required  Activity: NWB operative extremity.  PT today.  ROM as tolerated, immobilizer in bed  Diet: General  DVT: Mechanical (SCDs) + Pharmacologic (None required for ortho, cleared for LMWH if desired by trauma)  Dispo: D/C planning    "

## 2018-04-13 NOTE — PROGRESS NOTES
Patient is alert and oriented x 4 and stated the lyrica helped with the pain.  Drain removed per orders since output was 5ml in the last 24 hrs, roll gauze applied and ace wrapped lightly, immobilizer used, patient tolerated but with pain.  Patient was medicated for pain and is trying to use relaxation techniques along with pain medication.

## 2018-04-14 PROCEDURE — 700102 HCHG RX REV CODE 250 W/ 637 OVERRIDE(OP): Performed by: PHYSICIAN ASSISTANT

## 2018-04-14 PROCEDURE — A9270 NON-COVERED ITEM OR SERVICE: HCPCS | Performed by: SURGERY

## 2018-04-14 PROCEDURE — 700112 HCHG RX REV CODE 229: Performed by: SURGERY

## 2018-04-14 PROCEDURE — A9270 NON-COVERED ITEM OR SERVICE: HCPCS | Performed by: PHYSICIAN ASSISTANT

## 2018-04-14 PROCEDURE — 700102 HCHG RX REV CODE 250 W/ 637 OVERRIDE(OP): Performed by: SURGERY

## 2018-04-14 PROCEDURE — 700111 HCHG RX REV CODE 636 W/ 250 OVERRIDE (IP): Performed by: PHYSICIAN ASSISTANT

## 2018-04-14 PROCEDURE — A9270 NON-COVERED ITEM OR SERVICE: HCPCS | Performed by: NURSE PRACTITIONER

## 2018-04-14 PROCEDURE — 700111 HCHG RX REV CODE 636 W/ 250 OVERRIDE (IP): Performed by: NURSE PRACTITIONER

## 2018-04-14 PROCEDURE — 770006 HCHG ROOM/CARE - MED/SURG/GYN SEMI*

## 2018-04-14 PROCEDURE — 700102 HCHG RX REV CODE 250 W/ 637 OVERRIDE(OP): Performed by: NURSE PRACTITIONER

## 2018-04-14 PROCEDURE — 93971 EXTREMITY STUDY: CPT

## 2018-04-14 RX ADMIN — POLYETHYLENE GLYCOL 3350 1 PACKET: 17 POWDER, FOR SOLUTION ORAL at 09:02

## 2018-04-14 RX ADMIN — OXYCODONE HYDROCHLORIDE 15 MG: 10 TABLET ORAL at 06:00

## 2018-04-14 RX ADMIN — PREGABALIN 150 MG: 75 CAPSULE ORAL at 19:38

## 2018-04-14 RX ADMIN — OXYCODONE HYDROCHLORIDE 10 MG: 10 TABLET ORAL at 02:23

## 2018-04-14 RX ADMIN — ASPIRIN 81 MG: 81 TABLET, CHEWABLE ORAL at 09:02

## 2018-04-14 RX ADMIN — OXYCODONE HYDROCHLORIDE 15 MG: 10 TABLET ORAL at 11:22

## 2018-04-14 RX ADMIN — STANDARDIZED SENNA CONCENTRATE AND DOCUSATE SODIUM 1 TABLET: 8.6; 5 TABLET, FILM COATED ORAL at 19:38

## 2018-04-14 RX ADMIN — PREGABALIN 150 MG: 75 CAPSULE ORAL at 09:02

## 2018-04-14 RX ADMIN — PREGABALIN 150 MG: 75 CAPSULE ORAL at 14:31

## 2018-04-14 RX ADMIN — POLYETHYLENE GLYCOL 3350 1 PACKET: 17 POWDER, FOR SOLUTION ORAL at 19:37

## 2018-04-14 RX ADMIN — OXYCODONE HYDROCHLORIDE 15 MG: 10 TABLET ORAL at 15:58

## 2018-04-14 RX ADMIN — DOCUSATE SODIUM 100 MG: 100 CAPSULE ORAL at 09:02

## 2018-04-14 RX ADMIN — ENOXAPARIN SODIUM 30 MG: 100 INJECTION SUBCUTANEOUS at 19:37

## 2018-04-14 RX ADMIN — HYDROMORPHONE HYDROCHLORIDE 1 MG: 10 INJECTION, SOLUTION INTRAMUSCULAR; INTRAVENOUS; SUBCUTANEOUS at 09:02

## 2018-04-14 RX ADMIN — OXYCODONE HYDROCHLORIDE 15 MG: 10 TABLET ORAL at 19:38

## 2018-04-14 RX ADMIN — DOCUSATE SODIUM 100 MG: 100 CAPSULE ORAL at 19:37

## 2018-04-14 RX ADMIN — MAGNESIUM HYDROXIDE 30 ML: 400 SUSPENSION ORAL at 09:02

## 2018-04-14 RX ADMIN — ENOXAPARIN SODIUM 30 MG: 100 INJECTION SUBCUTANEOUS at 09:03

## 2018-04-14 ASSESSMENT — PAIN SCALES - GENERAL
PAINLEVEL_OUTOF10: 4
PAINLEVEL_OUTOF10: 2
PAINLEVEL_OUTOF10: 10
PAINLEVEL_OUTOF10: 3
PAINLEVEL_OUTOF10: 4
PAINLEVEL_OUTOF10: ASSUMED PAIN PRESENT
PAINLEVEL_OUTOF10: 7
PAINLEVEL_OUTOF10: 4
PAINLEVEL_OUTOF10: ASSUMED PAIN PRESENT
PAINLEVEL_OUTOF10: 8
PAINLEVEL_OUTOF10: 2
PAINLEVEL_OUTOF10: 5

## 2018-04-14 NOTE — DISCHARGE PLANNING
Referral: Follow Up    Intervention: Received page from the [facility] .  SW spoke with pt via phone.  Pt expressed questions regarding Medi-Hiram.  SW explained, per PFA documentation, pt is pending Medi-Hiram, pt did not verbalized additional concerns.    Plan: As Above.

## 2018-04-14 NOTE — PROGRESS NOTES
"Patient seen and examined  Pain improved    Blood pressure 108/68, pulse 80, temperature 36.3 °C (97.3 °F), resp. rate 16, height 1.854 m (6' 1\"), weight 92.2 kg (203 lb 4.2 oz), SpO2 96 %.          No acute distress  Dressing clean dry and intact  Neurovascularly intact    POD#3    Plan:  DVT Prophylaxis- TEDS/SCDs  Weight Bearing Status-NWB  PT/OT  Antibiotics: None  Case Coordination          "

## 2018-04-14 NOTE — CARE PLAN
Problem: Safety  Goal: Will remain free from falls  Outcome: PROGRESSING AS EXPECTED  Treaded socks in place, bed in the lowest position, call light and belongings within reach, pt call for assistance appropriately    Problem: Pain Management  Goal: Pain level will decrease to patient's comfort goal  Outcome: PROGRESSING AS EXPECTED  Administered pain medications per MAR, teach non pharmacological techniques such as relaxation, imagery
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
## 2018-04-14 NOTE — PROGRESS NOTES
"S:  Seen and examined.  POD #2 s/p L BKA.  Doing well this morning.  No new complaints, pain controlled.  No phantom pain.    O: Blood pressure 127/68, pulse 63, temperature 36.1 °C (97 °F), resp. rate 16, height 1.854 m (6' 1\"), weight 92.2 kg (203 lb 4.2 oz), SpO2 95 %..      Intake/Output Summary (Last 24 hours) at 04/13/18 2205  Last data filed at 04/13/18 1630   Gross per 24 hour   Intake             1920 ml   Output             2330 ml   Net             -410 ml   .    Operative/injured extremity examined.  Minimal drain output.  Wound dressing c/d/i          A/P:    POD #2 s/p L BKA    Antibiotics: None required  Activity: NWB operative extremity.  PT today.  ROM as tolerated, immobilizer in bed  Diet: General  DVT: Mechanical (SCDs) + Pharmacologic (None required for ortho, cleared for LMWH if desired by trauma)  Dispo: D/C planning    "

## 2018-04-14 NOTE — PROGRESS NOTES
Pt continues to report pain level has improved.  Pt credits relief from routine prn oxy 15 and lyrica.  Pt's CMS continues to be intact on left surgical leg site.  Dressing is clean dry and intact.  Pt has been able to sleep throughout shift.

## 2018-04-15 PROCEDURE — A9270 NON-COVERED ITEM OR SERVICE: HCPCS | Performed by: SURGERY

## 2018-04-15 PROCEDURE — A9270 NON-COVERED ITEM OR SERVICE: HCPCS | Performed by: PHYSICIAN ASSISTANT

## 2018-04-15 PROCEDURE — A9270 NON-COVERED ITEM OR SERVICE: HCPCS | Performed by: NURSE PRACTITIONER

## 2018-04-15 PROCEDURE — 770006 HCHG ROOM/CARE - MED/SURG/GYN SEMI*

## 2018-04-15 PROCEDURE — 700111 HCHG RX REV CODE 636 W/ 250 OVERRIDE (IP): Performed by: NURSE PRACTITIONER

## 2018-04-15 PROCEDURE — 700102 HCHG RX REV CODE 250 W/ 637 OVERRIDE(OP): Performed by: PHYSICIAN ASSISTANT

## 2018-04-15 PROCEDURE — 700102 HCHG RX REV CODE 250 W/ 637 OVERRIDE(OP): Performed by: NURSE PRACTITIONER

## 2018-04-15 PROCEDURE — 700112 HCHG RX REV CODE 229: Performed by: SURGERY

## 2018-04-15 PROCEDURE — 700102 HCHG RX REV CODE 250 W/ 637 OVERRIDE(OP): Performed by: SURGERY

## 2018-04-15 RX ADMIN — POLYETHYLENE GLYCOL 3350 1 PACKET: 17 POWDER, FOR SOLUTION ORAL at 20:58

## 2018-04-15 RX ADMIN — DOCUSATE SODIUM 100 MG: 100 CAPSULE ORAL at 20:58

## 2018-04-15 RX ADMIN — MAGNESIUM HYDROXIDE 30 ML: 400 SUSPENSION ORAL at 08:15

## 2018-04-15 RX ADMIN — OXYCODONE HYDROCHLORIDE 15 MG: 10 TABLET ORAL at 01:29

## 2018-04-15 RX ADMIN — PREGABALIN 150 MG: 75 CAPSULE ORAL at 20:58

## 2018-04-15 RX ADMIN — OXYCODONE HYDROCHLORIDE 15 MG: 10 TABLET ORAL at 22:27

## 2018-04-15 RX ADMIN — OXYCODONE HYDROCHLORIDE 15 MG: 10 TABLET ORAL at 05:29

## 2018-04-15 RX ADMIN — DOCUSATE SODIUM 100 MG: 100 CAPSULE ORAL at 08:15

## 2018-04-15 RX ADMIN — OXYCODONE HYDROCHLORIDE 15 MG: 10 TABLET ORAL at 09:22

## 2018-04-15 RX ADMIN — PREGABALIN 150 MG: 75 CAPSULE ORAL at 14:59

## 2018-04-15 RX ADMIN — OXYCODONE HYDROCHLORIDE 15 MG: 10 TABLET ORAL at 14:59

## 2018-04-15 RX ADMIN — ENOXAPARIN SODIUM 30 MG: 100 INJECTION SUBCUTANEOUS at 20:58

## 2018-04-15 RX ADMIN — ENOXAPARIN SODIUM 30 MG: 100 INJECTION SUBCUTANEOUS at 08:14

## 2018-04-15 RX ADMIN — ASPIRIN 81 MG: 81 TABLET, CHEWABLE ORAL at 08:15

## 2018-04-15 RX ADMIN — POLYETHYLENE GLYCOL 3350 1 PACKET: 17 POWDER, FOR SOLUTION ORAL at 08:14

## 2018-04-15 RX ADMIN — PREGABALIN 150 MG: 75 CAPSULE ORAL at 08:15

## 2018-04-15 RX ADMIN — STANDARDIZED SENNA CONCENTRATE AND DOCUSATE SODIUM 1 TABLET: 8.6; 5 TABLET, FILM COATED ORAL at 20:58

## 2018-04-15 RX ADMIN — OXYCODONE HYDROCHLORIDE 15 MG: 10 TABLET ORAL at 18:35

## 2018-04-15 ASSESSMENT — PAIN SCALES - GENERAL
PAINLEVEL_OUTOF10: 8
PAINLEVEL_OUTOF10: 5
PAINLEVEL_OUTOF10: 8
PAINLEVEL_OUTOF10: ASSUMED PAIN PRESENT
PAINLEVEL_OUTOF10: 3
PAINLEVEL_OUTOF10: 8
PAINLEVEL_OUTOF10: 8
PAINLEVEL_OUTOF10: 6
PAINLEVEL_OUTOF10: ASSUMED PAIN PRESENT
PAINLEVEL_OUTOF10: 5
PAINLEVEL_OUTOF10: 4

## 2018-04-15 NOTE — CARE PLAN
"Problem: Safety  Goal: Will remain free from injury    Intervention: Provide assistance with mobility  Pt ambulated in ferrer. Pt steady with walker. Pt states  He \"will get crutches tomorrow\". Pt motivated for improvement.      Problem: Pain Management  Goal: Pain level will decrease to patient's comfort goal    Intervention: Educate and implement non-pharmacologic comfort measures. Examples: relaxation, distration, play therapy, activity therapy, massage, etc.  Discussed with patient the importance of tapering down pain medication and not to assume he can stop pain medication all at once. Pt indicated understanding and agreement.        "

## 2018-04-15 NOTE — CARE PLAN
Problem: Respiratory:  Goal: Respiratory status will improve  Outcome: PROGRESSING AS EXPECTED  Encouraged IS. Deep breathing/ coughing. Mobility. OOB for meals.

## 2018-04-15 NOTE — PROGRESS NOTES
Patient seen and examined  POD 4 s/p left BKA  Pain controllled  Sitting in bed   Dressing taken down yesterday by wound team  Afebrile       No acute distress  Dressing clean dry and intact  Neurovascularly intact       Plan:  DVT Prophylaxis- TEDS/SCDs  Weight Bearing Status-NWB  PT/OT  Antibiotics: None  Case Coordination

## 2018-04-15 NOTE — CARE PLAN
Problem: Pain Management  Goal: Pain level will decrease to patient's comfort goal  Outcome: PROGRESSING AS EXPECTED    Intervention: Follow pain managment plan developed in collaboration with patient and Interdisciplinary Team  Pt reports pain manageable under current regiment   Intervention: Educate and implement non-pharmacologic comfort measures. Examples: relaxation, distration, play therapy, activity therapy, massage, etc.  Pt educated an verbalized understanding regarding utilization of non pharmacologic interventions such as ice.

## 2018-04-15 NOTE — DISCHARGE PLANNING
Rehab monitoring. Physiatry Dr. Putnam to f/u Monday for tolerance/participation for IRF level regimen.

## 2018-04-16 LAB
MAGNESIUM SERPL-MCNC: 2.2 MG/DL (ref 1.5–2.5)
PHOSPHATE SERPL-MCNC: 4.4 MG/DL (ref 2.5–4.5)

## 2018-04-16 PROCEDURE — 700102 HCHG RX REV CODE 250 W/ 637 OVERRIDE(OP): Performed by: PHYSICIAN ASSISTANT

## 2018-04-16 PROCEDURE — 700102 HCHG RX REV CODE 250 W/ 637 OVERRIDE(OP): Performed by: SURGERY

## 2018-04-16 PROCEDURE — 97535 SELF CARE MNGMENT TRAINING: CPT

## 2018-04-16 PROCEDURE — A9270 NON-COVERED ITEM OR SERVICE: HCPCS | Performed by: SURGERY

## 2018-04-16 PROCEDURE — 700111 HCHG RX REV CODE 636 W/ 250 OVERRIDE (IP): Performed by: PHYSICIAN ASSISTANT

## 2018-04-16 PROCEDURE — A9270 NON-COVERED ITEM OR SERVICE: HCPCS | Performed by: PHYSICIAN ASSISTANT

## 2018-04-16 PROCEDURE — 84100 ASSAY OF PHOSPHORUS: CPT

## 2018-04-16 PROCEDURE — 700112 HCHG RX REV CODE 229: Performed by: SURGERY

## 2018-04-16 PROCEDURE — 97530 THERAPEUTIC ACTIVITIES: CPT

## 2018-04-16 PROCEDURE — 700111 HCHG RX REV CODE 636 W/ 250 OVERRIDE (IP): Performed by: NURSE PRACTITIONER

## 2018-04-16 PROCEDURE — 36415 COLL VENOUS BLD VENIPUNCTURE: CPT

## 2018-04-16 PROCEDURE — 97116 GAIT TRAINING THERAPY: CPT

## 2018-04-16 PROCEDURE — 700102 HCHG RX REV CODE 250 W/ 637 OVERRIDE(OP): Performed by: NURSE PRACTITIONER

## 2018-04-16 PROCEDURE — 770006 HCHG ROOM/CARE - MED/SURG/GYN SEMI*

## 2018-04-16 PROCEDURE — 83735 ASSAY OF MAGNESIUM: CPT

## 2018-04-16 PROCEDURE — 97110 THERAPEUTIC EXERCISES: CPT

## 2018-04-16 PROCEDURE — A9270 NON-COVERED ITEM OR SERVICE: HCPCS | Performed by: NURSE PRACTITIONER

## 2018-04-16 RX ADMIN — PREGABALIN 150 MG: 75 CAPSULE ORAL at 08:18

## 2018-04-16 RX ADMIN — OXYCODONE HYDROCHLORIDE 15 MG: 10 TABLET ORAL at 01:56

## 2018-04-16 RX ADMIN — PREGABALIN 150 MG: 75 CAPSULE ORAL at 16:18

## 2018-04-16 RX ADMIN — PREGABALIN 150 MG: 75 CAPSULE ORAL at 19:47

## 2018-04-16 RX ADMIN — OXYCODONE HYDROCHLORIDE 15 MG: 10 TABLET ORAL at 16:19

## 2018-04-16 RX ADMIN — ENOXAPARIN SODIUM 30 MG: 100 INJECTION SUBCUTANEOUS at 19:46

## 2018-04-16 RX ADMIN — OXYCODONE HYDROCHLORIDE 15 MG: 10 TABLET ORAL at 12:48

## 2018-04-16 RX ADMIN — POLYETHYLENE GLYCOL 3350 1 PACKET: 17 POWDER, FOR SOLUTION ORAL at 08:19

## 2018-04-16 RX ADMIN — OXYCODONE HYDROCHLORIDE 15 MG: 10 TABLET ORAL at 05:05

## 2018-04-16 RX ADMIN — ASPIRIN 81 MG: 81 TABLET, CHEWABLE ORAL at 08:19

## 2018-04-16 RX ADMIN — HYDROMORPHONE HYDROCHLORIDE 1 MG: 10 INJECTION, SOLUTION INTRAMUSCULAR; INTRAVENOUS; SUBCUTANEOUS at 21:33

## 2018-04-16 RX ADMIN — OXYCODONE HYDROCHLORIDE 15 MG: 10 TABLET ORAL at 23:05

## 2018-04-16 RX ADMIN — OXYCODONE HYDROCHLORIDE 15 MG: 10 TABLET ORAL at 19:47

## 2018-04-16 RX ADMIN — STANDARDIZED SENNA CONCENTRATE AND DOCUSATE SODIUM 1 TABLET: 8.6; 5 TABLET, FILM COATED ORAL at 19:47

## 2018-04-16 RX ADMIN — POLYETHYLENE GLYCOL 3350 1 PACKET: 17 POWDER, FOR SOLUTION ORAL at 21:00

## 2018-04-16 RX ADMIN — DOCUSATE SODIUM 100 MG: 100 CAPSULE ORAL at 08:19

## 2018-04-16 RX ADMIN — ENOXAPARIN SODIUM 30 MG: 100 INJECTION SUBCUTANEOUS at 08:19

## 2018-04-16 RX ADMIN — DOCUSATE SODIUM 100 MG: 100 CAPSULE ORAL at 19:47

## 2018-04-16 RX ADMIN — MAGNESIUM HYDROXIDE 30 ML: 400 SUSPENSION ORAL at 08:19

## 2018-04-16 RX ADMIN — OXYCODONE HYDROCHLORIDE 15 MG: 10 TABLET ORAL at 08:18

## 2018-04-16 ASSESSMENT — PAIN SCALES - GENERAL
PAINLEVEL_OUTOF10: 4
PAINLEVEL_OUTOF10: 5
PAINLEVEL_OUTOF10: 5
PAINLEVEL_OUTOF10: 7
PAINLEVEL_OUTOF10: 7
PAINLEVEL_OUTOF10: 5
PAINLEVEL_OUTOF10: ASSUMED PAIN PRESENT
PAINLEVEL_OUTOF10: ASSUMED PAIN PRESENT
PAINLEVEL_OUTOF10: 5
PAINLEVEL_OUTOF10: 6
PAINLEVEL_OUTOF10: 7
PAINLEVEL_OUTOF10: 7
PAINLEVEL_OUTOF10: 4

## 2018-04-16 ASSESSMENT — COGNITIVE AND FUNCTIONAL STATUS - GENERAL
MOVING TO AND FROM BED TO CHAIR: A LITTLE
MOBILITY SCORE: 17
TURNING FROM BACK TO SIDE WHILE IN FLAT BAD: A LITTLE
DRESSING REGULAR LOWER BODY CLOTHING: A LITTLE
HELP NEEDED FOR BATHING: A LITTLE
WALKING IN HOSPITAL ROOM: A LITTLE
MOVING FROM LYING ON BACK TO SITTING ON SIDE OF FLAT BED: A LITTLE
SUGGESTED CMS G CODE MODIFIER MOBILITY: CK
STANDING UP FROM CHAIR USING ARMS: A LITTLE
PERSONAL GROOMING: A LITTLE
SUGGESTED CMS G CODE MODIFIER DAILY ACTIVITY: CJ
CLIMB 3 TO 5 STEPS WITH RAILING: A LOT
DAILY ACTIVITIY SCORE: 21

## 2018-04-16 ASSESSMENT — GAIT ASSESSMENTS
DEVIATION: STEP TO
GAIT LEVEL OF ASSIST: CONTACT GUARD ASSIST
DISTANCE (FEET): 250
ASSISTIVE DEVICE: FRONT WHEEL WALKER

## 2018-04-16 NOTE — DISCHARGE PLANNING
Spoke with Sayra at [facility], they have accepted the referral pending the completed MINDY.

## 2018-04-16 NOTE — CARE PLAN
Problem: Safety  Goal: Will remain free from injury  Outcome: PROGRESSING AS EXPECTED  Treaded socks in place, bed in the lowest position, call light and belongings within reach, pt call for assistance appropriately    Problem: Pain Management  Goal: Pain level will decrease to patient's comfort goal  Outcome: PROGRESSING AS EXPECTED  Pt. Taking oxy 15mg with adequate pain control.    Problem: Mobility  Goal: Risk for activity intolerance will decrease  Outcome: PROGRESSING AS EXPECTED  Pt. Up to chair and up to bathroom, standby assist with FWW.

## 2018-04-16 NOTE — CARE PLAN
Problem: Safety  Goal: Will remain free from injury  Outcome: PROGRESSING AS EXPECTED  Call light within reach; pt calls appropriately; DME in the bathroom; treaded socks on; hourly rounding in place.     Problem: Bowel/Gastric:  Goal: Normal bowel function is maintained or improved  Outcome: PROGRESSING AS EXPECTED      Problem: Pain Management  Goal: Pain level will decrease to patient's comfort goal  Outcome: PROGRESSING AS EXPECTED  Pain managed with prn pain medication per MAR.

## 2018-04-16 NOTE — THERAPY
"Physical Therapy Treatment completed.   Bed Mobility:  Supine to Sit: Stand by Assist  Transfers: Sit to Stand: Stand by Assist  Gait: Level Of Assist: Contact Guard Assist with Front-Wheel Walker       Plan of Care: Will benefit from Physical Therapy 4 times per week  Discharge Recommendations: Equipment: Will Continue to Assess for Equipment Needs.     See \"Rehab Therapy-Acute\" Patient Summary Report for complete documentation.     Pt presenting w/ improved functional mobility from initial eval after BKA. Pt does have some phantom limb pain and site pain while moving that causes pt to jerk. Pt educated on desensitization of residual limb to assist w/ pt receptive. Pt showed improved ambulation tolerance today. Pt was able to ambulate 250' w/ standing rest breaks. Pt also given an HEP to focus on hip strength and knee ROM. Pt very motivated to participate and would be able to tolerate post acute therapy at a higher level of intensity.   "

## 2018-04-16 NOTE — THERAPY
"Occupational Therapy Treatment completed with focus on ADLs, ADL transfers and patient education.  Functional Status:  Pt seen for OT tx. Up in chair upon arrival, pt tolerated amb w/ FWW and SBA. CGA toilet transfer w/ BSC over toilet for raised height. Pt demonstrated standing tolerance to complete light grooming activity w/ setup and CGA. Pt pleasant and cooperative. Pt motivated to regain strength, endurance and independence in ADLs and ADL transfers. Pt educated about phantom pain and desensitization w/ touch. Pt accepting to education. Pt demonstrated increased activity tolerance this session from previous session.   Plan of Care: Will benefit from Occupational Therapy 3 times per week  Discharge Recommendations:  Equipment Will Continue to Assess for Equipment Needs.     See \"Rehab Therapy-Acute\" Patient Summary Report for complete documentation.   "

## 2018-04-17 VITALS
WEIGHT: 203.26 LBS | BODY MASS INDEX: 26.94 KG/M2 | HEIGHT: 73 IN | SYSTOLIC BLOOD PRESSURE: 111 MMHG | RESPIRATION RATE: 18 BRPM | HEART RATE: 83 BPM | TEMPERATURE: 97.9 F | OXYGEN SATURATION: 97 % | DIASTOLIC BLOOD PRESSURE: 71 MMHG

## 2018-04-17 PROBLEM — Z53.09 CONTRAINDICATION TO DEEP VEIN THROMBOSIS (DVT) PROPHYLAXIS: Status: RESOLVED | Noted: 2018-03-24 | Resolved: 2018-04-17

## 2018-04-17 PROBLEM — G89.18 POSTOPERATIVE PAIN: Status: ACTIVE | Noted: 2018-04-17

## 2018-04-17 PROBLEM — S88.112A COMPLETE BELOW KNEE AMPUTATION OF LEFT LOWER EXTREMITY: Status: ACTIVE | Noted: 2018-04-17

## 2018-04-17 PROCEDURE — 700111 HCHG RX REV CODE 636 W/ 250 OVERRIDE (IP): Performed by: NURSE PRACTITIONER

## 2018-04-17 PROCEDURE — A9270 NON-COVERED ITEM OR SERVICE: HCPCS | Performed by: PHYSICIAN ASSISTANT

## 2018-04-17 PROCEDURE — 700102 HCHG RX REV CODE 250 W/ 637 OVERRIDE(OP): Performed by: PHYSICIAN ASSISTANT

## 2018-04-17 PROCEDURE — A9270 NON-COVERED ITEM OR SERVICE: HCPCS | Performed by: SURGERY

## 2018-04-17 PROCEDURE — 700102 HCHG RX REV CODE 250 W/ 637 OVERRIDE(OP): Performed by: SURGERY

## 2018-04-17 PROCEDURE — A9270 NON-COVERED ITEM OR SERVICE: HCPCS | Performed by: NURSE PRACTITIONER

## 2018-04-17 PROCEDURE — 700102 HCHG RX REV CODE 250 W/ 637 OVERRIDE(OP): Performed by: NURSE PRACTITIONER

## 2018-04-17 PROCEDURE — 700112 HCHG RX REV CODE 229: Performed by: SURGERY

## 2018-04-17 RX ORDER — ACETAMINOPHEN 325 MG/1
650 TABLET ORAL EVERY 4 HOURS PRN
Qty: 30 TAB | Refills: 0 | Status: SHIPPED | OUTPATIENT
Start: 2018-04-17 | End: 2022-08-04

## 2018-04-17 RX ORDER — DIAZEPAM 5 MG/1
5 TABLET ORAL EVERY 12 HOURS PRN
Qty: 20 TAB | Refills: 0 | Status: SHIPPED | OUTPATIENT
Start: 2018-04-17 | End: 2018-04-27

## 2018-04-17 RX ORDER — OXYCODONE HYDROCHLORIDE 15 MG/1
15 TABLET ORAL EVERY 6 HOURS PRN
Qty: 50 TAB | Refills: 0 | Status: SHIPPED | OUTPATIENT
Start: 2018-04-17 | End: 2018-05-01

## 2018-04-17 RX ORDER — PSEUDOEPHEDRINE HCL 30 MG
100 TABLET ORAL 2 TIMES DAILY
Qty: 60 CAP | Refills: 0 | Status: SHIPPED | OUTPATIENT
Start: 2018-04-17 | End: 2022-08-04

## 2018-04-17 RX ORDER — ASPIRIN 81 MG/1
81 TABLET, CHEWABLE ORAL DAILY
Qty: 100 TAB | Refills: 0 | Status: SHIPPED | OUTPATIENT
Start: 2018-04-18 | End: 2022-08-04

## 2018-04-17 RX ADMIN — ASPIRIN 81 MG: 81 TABLET, CHEWABLE ORAL at 08:25

## 2018-04-17 RX ADMIN — ENOXAPARIN SODIUM 30 MG: 100 INJECTION SUBCUTANEOUS at 08:25

## 2018-04-17 RX ADMIN — PREGABALIN 150 MG: 75 CAPSULE ORAL at 08:25

## 2018-04-17 RX ADMIN — OXYCODONE HYDROCHLORIDE 15 MG: 10 TABLET ORAL at 10:49

## 2018-04-17 RX ADMIN — PREGABALIN 150 MG: 75 CAPSULE ORAL at 13:56

## 2018-04-17 RX ADMIN — DOCUSATE SODIUM 100 MG: 100 CAPSULE ORAL at 08:25

## 2018-04-17 RX ADMIN — MAGNESIUM HYDROXIDE 30 ML: 400 SUSPENSION ORAL at 08:25

## 2018-04-17 RX ADMIN — POLYETHYLENE GLYCOL 3350 1 PACKET: 17 POWDER, FOR SOLUTION ORAL at 08:25

## 2018-04-17 RX ADMIN — OXYCODONE HYDROCHLORIDE 15 MG: 10 TABLET ORAL at 13:56

## 2018-04-17 RX ADMIN — OXYCODONE HYDROCHLORIDE 15 MG: 10 TABLET ORAL at 17:50

## 2018-04-17 RX ADMIN — OXYCODONE HYDROCHLORIDE 15 MG: 10 TABLET ORAL at 02:46

## 2018-04-17 RX ADMIN — OXYCODONE HYDROCHLORIDE 15 MG: 10 TABLET ORAL at 06:10

## 2018-04-17 ASSESSMENT — PAIN SCALES - GENERAL
PAINLEVEL_OUTOF10: ASSUMED PAIN PRESENT
PAINLEVEL_OUTOF10: 6
PAINLEVEL_OUTOF10: ASSUMED PAIN PRESENT
PAINLEVEL_OUTOF10: 5
PAINLEVEL_OUTOF10: 3
PAINLEVEL_OUTOF10: 8
PAINLEVEL_OUTOF10: 8
PAINLEVEL_OUTOF10: 3
PAINLEVEL_OUTOF10: 3
PAINLEVEL_OUTOF10: 8
PAINLEVEL_OUTOF10: 7

## 2018-04-17 NOTE — CARE PLAN
Problem: Safety  Goal: Will remain free from injury  Outcome: PROGRESSING AS EXPECTED  Treaded socks in place, bed in the lowest position, call light and belongings within reach, pt call for assistance appropriately    Problem: Mobility  Goal: Risk for activity intolerance will decrease  Outcome: PROGRESSING AS EXPECTED  Pt. Up out of bed for meals and ambulates in the halls, standby assist with FWW.

## 2018-04-17 NOTE — CARE PLAN
Problem: Pain Management  Goal: Pain level will decrease to patient's comfort goal  Outcome: PROGRESSING AS EXPECTED  Pt reports pain manageable on current regiment     Problem: Mobility  Goal: Risk for activity intolerance will decrease  Outcome: PROGRESSING AS EXPECTED

## 2018-04-17 NOTE — DISCHARGE PLANNING
Received transportation request. Spoke with Ginny at [facility], transportation has been set up for 1800 via [employer]. Maira MCGEE notified.

## 2018-04-17 NOTE — PROGRESS NOTES
Seen and examined, in good spirits.  Stump protector in place.    Exam: Deferred    #1 L BKA for failed limb salvage of open lisfranc    Plan:  - Knee ROM, NWB LLE  -SCD's  - D/C planning

## 2018-04-17 NOTE — DISCHARGE PLANNING
Anticipated Discharge Disposition: SNF    Action: Pt has been medically cleared for transfer to SNF. Pt has been accepted to [facility]  by Dr. Bernstein. LSW called [facility] and set up transport through [employer] for 1800. Pt was updated and agreeable to transfer. LSW updated pt, bedside RN, and completed the transfer packet.      Barriers to Discharge: None    Plan: Pt is medically clear to transfer to SNF at 1800.

## 2018-04-17 NOTE — PROGRESS NOTES
Sleeping this AM on rounds.  Stump protector in place.    Exam: Deferred    #1 L BKA for failed limb salvage of open lisfranc    Plan:  - Knee ROM, NWB LLE  -SCD's  - D/C planning
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
## 2018-04-18 NOTE — PROGRESS NOTES
Pt. D/C'd to [facility].  Discharge instructions provided to pt.  Pt states understanding.  Pt states all questions have been answered.  Copy of discharge provided to pt.  Signed copy in chart.  Prescriptions provided to pt, copy in chart. Pt states that all personal belongings are in possession. Pt escorted off unit with assistance from medical transportation without incident.

## 2018-04-18 NOTE — DISCHARGE INSTRUCTIONS
Discharge Instructions    Discharged to other by medical transportation with escort. Discharged via wheelchair, hospital escort: Yes.  Special equipment needed: Not Applicable    Be sure to schedule a follow-up appointment with your primary care doctor or any specialists as instructed.   Follow up with Dr. Aguayo within two weeks  Take medication as prescribed  For any questions or concerns contact MD    Discharge Plan:   Diet Plan: Discussed  Activity Level: Discussed  Confirmed Follow up Appointment: Patient to Call and Schedule Appointment  Influenza Vaccine Indication: Patient Refuses    I understand that a diet low in cholesterol, fat, and sodium is recommended for good health. Unless I have been given specific instructions below for another diet, I accept this instruction as my diet prescription.   Other diet: Regular diet    Special Instructions: Discharge instructions for the Orthopedic Patient    Follow up with Primary Care Physician within 2 weeks of discharge to home, regarding:  Review of medications and diagnostic testing.  Surveillance for medical complications.  Workup and treatment of osteoporosis, if appropriate.     -Is this a Joint Replacement patient? No    -Is this patient being discharged with medication to prevent blood clots?  No    · Is patient discharged on Warfarin / Coumadin?   No        Depression / Suicide Risk    As you are discharged from this [facility] facility, it is important to learn how to keep safe from harming yourself.    Recognize the warning signs:  · Abrupt changes in personality, positive or negative- including increase in energy   · Giving away possessions  · Change in eating patterns- significant weight changes-  positive or negative  · Change in sleeping patterns- unable to sleep or sleeping all the time   · Unwillingness or inability to communicate  · Depression  · Unusual sadness, discouragement and loneliness  · Talk of wanting to die  · Neglect of personal  appearance   · Rebelliousness- reckless behavior  · Withdrawal from people/activities they love  · Confusion- inability to concentrate     If you or a loved one observes any of these behaviors or has concerns about self-harm, here's what you can do:  · Talk about it- your feelings and reasons for harming yourself  · Remove any means that you might use to hurt yourself (examples: pills, rope, extension cords, firearm)  · Get professional help from the community (Mental Health, Substance Abuse, psychological counseling)  · Do not be alone:Call your Safe Contact- someone whom you trust who will be there for you.  · Call your local CRISIS HOTLINE 447-2978 or 134-625-8090  · Call your local Children's Mobile Crisis Response Team [place] (166) 087-6290 or www.Free[place]  · Call the toll free National Suicide Prevention Hotlines   · National Suicide Prevention Lifeline 329-954-AXHF (2466)  · National Hope Line Network 800-SUICIDE (091-5282)
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
## 2018-06-07 ENCOUNTER — TELEPHONE (OUTPATIENT)
Dept: CARDIOLOGY | Facility: MEDICAL CENTER | Age: 63
End: 2018-06-07

## 2018-06-07 NOTE — TELEPHONE ENCOUNTER
Spoke with patient who states he has a bottle of clopidogrel that he stopped months ago and he is asking if he should be taking it.  Advised per ANNEL HERNANDEZ that she can't advise because he has not been seen since 1/2017 and it is the federal law that he must be seen in order for her to advise and prescribe medications.  He does not want to be scheduled, he has medical and does not think we take that insurance.  Advised that he speak with the schedulers who are aware if we can see him or not.  Per ANNEL HERNANDEZ he should see Dr. Miller at [facility].

## 2018-06-07 NOTE — TELEPHONE ENCOUNTER
----- Message from Celsa Gould sent at 6/7/2018 10:50 AM PDT -----  Regarding: Has question about blood thinner   Contact: [phone]  ANNEL/Cami    Pt wants to know if he is to keep taking his blood thinner. Please call pt to advise at [phone] after 2 pm.

## 2018-11-13 NOTE — ADDENDUM NOTE
Encounter addended by: Liz Woods on: 11/13/2018 12:44 PM<BR>    Actions taken: Charge Capture section accepted

## 2019-10-17 NOTE — ED NOTES
at bedside  
"Med rec updated and complete  Allergies reviewed  Pt states \"No prescription medications, OTC'S, or vitamins\".  Pt states \"No antibiotics in the last 30 days\".    "
Chief Complaint   Patient presents with   • Chest Pressure     transfer from [facility]       Pt reports onset 2-3 days ago.  Also had episode of dizziness which is resolved now  
Dr Gutiérrez at bedside for eval.  Ativan given  
Lab called with a trop of 2.84. Lab results read back to lab. erp notified and lab results read back by ERP  
POC discussed with pt.  Awaiting room assignment.   vss  
Pt to cath lab.  
No - the patient is unable to be screened due to medical condition

## 2020-01-25 RX ORDER — ATORVASTATIN CALCIUM 40 MG/1
40 TABLET, FILM COATED ORAL EVERY EVENING
COMMUNITY
Start: 2019-11-26 | End: 2023-05-30 | Stop reason: SDUPTHER

## 2020-01-26 NOTE — ADDENDUM NOTE
Encounter addended by: Soniya Olmos, PharmD on: 1/25/2020 8:35 PM   Actions taken: Outside historical medications filed

## 2022-08-04 ENCOUNTER — OFFICE VISIT (OUTPATIENT)
Dept: MEDICAL GROUP | Facility: CLINIC | Age: 67
End: 2022-08-04
Payer: MEDICARE

## 2022-08-04 VITALS
DIASTOLIC BLOOD PRESSURE: 70 MMHG | BODY MASS INDEX: 28.6 KG/M2 | HEART RATE: 73 BPM | RESPIRATION RATE: 18 BRPM | SYSTOLIC BLOOD PRESSURE: 116 MMHG | WEIGHT: 215.8 LBS | TEMPERATURE: 98.1 F | HEIGHT: 73 IN | OXYGEN SATURATION: 96 %

## 2022-08-04 DIAGNOSIS — Z12.5 SCREENING FOR MALIGNANT NEOPLASM OF PROSTATE: ICD-10-CM

## 2022-08-04 DIAGNOSIS — Z00.00 ROUTINE PHYSICAL EXAMINATION: ICD-10-CM

## 2022-08-04 DIAGNOSIS — I25.10 CORONARY ARTERY DISEASE INVOLVING NATIVE CORONARY ARTERY OF NATIVE HEART WITHOUT ANGINA PECTORIS: ICD-10-CM

## 2022-08-04 DIAGNOSIS — G89.29 CHRONIC PAIN OF RIGHT KNEE: ICD-10-CM

## 2022-08-04 DIAGNOSIS — E78.5 DYSLIPIDEMIA: ICD-10-CM

## 2022-08-04 DIAGNOSIS — M25.561 CHRONIC PAIN OF RIGHT KNEE: ICD-10-CM

## 2022-08-04 DIAGNOSIS — E55.9 VITAMIN D DEFICIENCY: ICD-10-CM

## 2022-08-04 DIAGNOSIS — Z11.59 ENCOUNTER FOR HEPATITIS C SCREENING TEST FOR LOW RISK PATIENT: ICD-10-CM

## 2022-08-04 DIAGNOSIS — Z13.6 SCREENING FOR AAA (ABDOMINAL AORTIC ANEURYSM): ICD-10-CM

## 2022-08-04 PROBLEM — M25.522 LEFT ELBOW PAIN: Status: ACTIVE | Noted: 2018-11-19

## 2022-08-04 PROBLEM — M79.671 RIGHT FOOT PAIN: Status: ACTIVE | Noted: 2018-08-08

## 2022-08-04 PROBLEM — K21.9 CHRONIC GASTROESOPHAGEAL REFLUX DISEASE: Status: ACTIVE | Noted: 2021-04-29

## 2022-08-04 PROBLEM — M17.12 ARTHRITIS OF LEFT KNEE: Status: ACTIVE | Noted: 2018-06-27

## 2022-08-04 PROBLEM — M76.891 PES ANSERINUS TENDONITIS OF RIGHT LOWER EXTREMITY: Status: ACTIVE | Noted: 2019-07-25

## 2022-08-04 PROCEDURE — 99204 OFFICE O/P NEW MOD 45 MIN: CPT | Performed by: PHYSICIAN ASSISTANT

## 2022-08-04 ASSESSMENT — PATIENT HEALTH QUESTIONNAIRE - PHQ9: CLINICAL INTERPRETATION OF PHQ2 SCORE: 0
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
## 2022-08-04 NOTE — PATIENT INSTRUCTIONS
[facility]  Address: [address] [place] [id number]  Phone: [phone]  For medicaid questions

## 2022-08-25 ENCOUNTER — OFFICE VISIT (OUTPATIENT)
Dept: MEDICAL GROUP | Facility: CLINIC | Age: 67
End: 2022-08-25
Payer: MEDICARE

## 2022-08-25 VITALS
TEMPERATURE: 97.7 F | HEART RATE: 70 BPM | HEIGHT: 73 IN | OXYGEN SATURATION: 95 % | WEIGHT: 217.2 LBS | RESPIRATION RATE: 20 BRPM | SYSTOLIC BLOOD PRESSURE: 136 MMHG | DIASTOLIC BLOOD PRESSURE: 90 MMHG | BODY MASS INDEX: 28.79 KG/M2

## 2022-08-25 DIAGNOSIS — H57.89 EYE IRRITATION: ICD-10-CM

## 2022-08-25 DIAGNOSIS — Z00.00 ROUTINE PHYSICAL EXAMINATION: ICD-10-CM

## 2022-08-25 PROCEDURE — 99213 OFFICE O/P EST LOW 20 MIN: CPT | Performed by: PHYSICIAN ASSISTANT
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
## 2022-08-25 NOTE — ASSESSMENT & PLAN NOTE
Patient states that his eye has been red and irritated for the last 7 months. He states that he has had a clogged tear duct in the past and thinks that it might be clogged again. I recommend that he establish with optometrist so they can look at his eye and see what is wrong with it as I can do nothing for a clogged tear duct. Patient has no coverage for eyes with his medicare so he is trying to get insurance. He denies pain or itching of the eye. States that it does not really water it just is blurry first thing in the morning and is constantly red.

## 2022-08-25 NOTE — ASSESSMENT & PLAN NOTE
Patient states that he is trying to get on medicaid as a supplement to his medicare. He has no money currently to do his labs or x-rays. I have reassured him that there is no rush for these tests and that he should make a follow up appointment with me once he has his insurance established and we will proceed from that point.

## 2022-08-30 PROBLEM — I25.10 CORONARY ARTERY DISEASE: Status: RESOLVED | Noted: 2018-03-22 | Resolved: 2022-08-30

## 2022-08-31 NOTE — ASSESSMENT & PLAN NOTE
Chronic condition not well controlled. Currently on atorvastatin 40 mg. States that he has been much better about being consistent with medication over the last year.  Denies chest pain, shortness of breath or muscle pain. Labs have been checked in past year and total chol 203, Tri 193, HDL 42, . Will continue prescribed medications as directed.  Due for routine fasting labs.

## 2022-08-31 NOTE — ASSESSMENT & PLAN NOTE
Patient has a complete BKA on Left. He states that he is having pain in the right knee now. He feels that the prosthetic changed his gait and this is causing the pain. He does not currently have insurance. Will order x-ray of right knee and he will have it completed when he gets his coverage. Have given him information for health and Human Services to help him get established with Medicaid. Will follow up after x-ray.

## 2022-08-31 NOTE — ASSESSMENT & PLAN NOTE
Chronic condition for this patient. Not currently on supplementation. Due for labs to check levels.

## 2022-08-31 NOTE — ASSESSMENT & PLAN NOTE
Patient here today to establish care. Due for routine fasting labs. Will follow up in 2 weeks with results.  Outside provider was previous PCP. Outside records are in current chart through care everywhere. No medical records requested.

## 2022-08-31 NOTE — ASSESSMENT & PLAN NOTE
Chronic condition for this patient. He has a history of NSTEMI in 2017. Currently taking atorvastatin 40 mg and 81 mg aspirin daily. Had been followed closely by cardiology at [facility].New to our area and needs to establish with local provider. Has asked to wait until he has insurance before he gets a referral to establish. Last seen by cardiology 05/22 and is currently stable.

## 2023-04-17 ENCOUNTER — OFFICE VISIT (OUTPATIENT)
Dept: MEDICAL GROUP | Facility: CLINIC | Age: 68
End: 2023-04-17
Payer: MEDICARE

## 2023-04-17 VITALS
SYSTOLIC BLOOD PRESSURE: 122 MMHG | BODY MASS INDEX: 30.24 KG/M2 | HEIGHT: 73 IN | DIASTOLIC BLOOD PRESSURE: 78 MMHG | WEIGHT: 228.2 LBS | OXYGEN SATURATION: 96 % | RESPIRATION RATE: 20 BRPM | TEMPERATURE: 98 F | HEART RATE: 74 BPM

## 2023-04-17 DIAGNOSIS — S88.112S: ICD-10-CM

## 2023-04-17 PROCEDURE — 99213 OFFICE O/P EST LOW 20 MIN: CPT | Performed by: PHYSICIAN ASSISTANT

## 2023-04-17 RX ORDER — ATORVASTATIN CALCIUM 10 MG/1
TABLET, FILM COATED ORAL DAILY
COMMUNITY
End: 2023-04-17

## 2023-04-17 ASSESSMENT — PATIENT HEALTH QUESTIONNAIRE - PHQ9: CLINICAL INTERPRETATION OF PHQ2 SCORE: 0
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
## 2023-04-17 NOTE — ASSESSMENT & PLAN NOTE
Patient has a BKA of the left leg. He states that he fell over the weekend and landed on his stump. The stump/knee has been painful and swollen since. He states that it is slightly better today but is still swollen and uncomfortable. Want to have it checked to be sure that there is no damage to the area. There is no bruising and minimal swelling to the knee. No tenderness to palpation and he states it is feeling better today than it has since he fell. Will continue to ice, OTC anti-inflammatories and rest. He will return if symptoms are not improving for further evaluation.

## 2023-05-30 ENCOUNTER — OFFICE VISIT (OUTPATIENT)
Dept: MEDICAL GROUP | Facility: CLINIC | Age: 68
End: 2023-05-30
Payer: MEDICARE

## 2023-05-30 VITALS
OXYGEN SATURATION: 94 % | RESPIRATION RATE: 18 BRPM | BODY MASS INDEX: 29.93 KG/M2 | HEIGHT: 72 IN | DIASTOLIC BLOOD PRESSURE: 68 MMHG | HEART RATE: 81 BPM | WEIGHT: 221 LBS | SYSTOLIC BLOOD PRESSURE: 106 MMHG | TEMPERATURE: 97.9 F

## 2023-05-30 DIAGNOSIS — I21.4 NSTEMI (NON-ST ELEVATED MYOCARDIAL INFARCTION) (HCC): ICD-10-CM

## 2023-05-30 DIAGNOSIS — E78.5 DYSLIPIDEMIA: ICD-10-CM

## 2023-05-30 DIAGNOSIS — S88.112S: ICD-10-CM

## 2023-05-30 DIAGNOSIS — Z97.10 EMPLOYS PROSTHETIC LEG: ICD-10-CM

## 2023-05-30 PROCEDURE — 3074F SYST BP LT 130 MM HG: CPT | Performed by: PHYSICIAN ASSISTANT

## 2023-05-30 PROCEDURE — 99214 OFFICE O/P EST MOD 30 MIN: CPT | Performed by: PHYSICIAN ASSISTANT

## 2023-05-30 PROCEDURE — 3078F DIAST BP <80 MM HG: CPT | Performed by: PHYSICIAN ASSISTANT

## 2023-05-30 RX ORDER — ATORVASTATIN CALCIUM 40 MG/1
40 TABLET, FILM COATED ORAL EVERY EVENING
Qty: 90 TABLET | Refills: 1 | Status: SHIPPED | OUTPATIENT
Start: 2023-05-30 | End: 2024-03-27 | Stop reason: SDUPTHER
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
## 2023-06-04 PROBLEM — Z97.10 EMPLOYS PROSTHETIC LEG: Status: ACTIVE | Noted: 2023-06-04

## 2023-06-05 NOTE — ASSESSMENT & PLAN NOTE
Chronic condition well controlled on atorvastatin 40 mg every evening. Denies chest pain, shortness of breath or muscle pain. Labs have been checked in past year and are stable on current dose. Will continue prescribed medications as directed.  Requesting refill of prescription.    Controlled

## 2023-06-05 NOTE — ASSESSMENT & PLAN NOTE
Patient presents today requesting a new prescription for external leg sleeve to hold on his prosthetic.  He may also need internal leg sleeve.  He is requesting that the prescription be sent to the  clinic.  Patient has a lab 2 sleeves per year according to insurance.  Prescription has been written for such and has been faxed to the  clinic as requested.

## 2023-06-26 ENCOUNTER — TELEPHONE (OUTPATIENT)
Dept: MEDICAL GROUP | Facility: CLINIC | Age: 68
End: 2023-06-26
Payer: MEDICARE

## 2023-06-26 NOTE — TELEPHONE ENCOUNTER
Caller Name: Rory Mason McMahon    Call Back Number: [phone] (home)       How would the patient prefer to be contacted with a response: Phone call OK to leave a detailed message    Pt came into clinic and stated [facility] has called and they need one more thing to order the sleeves for the patient, patient said when he last spoke with them they said they had called our office several times to obtain the last thing they need, pt is unsure what they last thing they need is,.       I will try to check the voicemails and see if they had left a message stating what is needed or I will call them back to find out

## 2023-06-27 ENCOUNTER — TELEPHONE (OUTPATIENT)
Dept: MEDICAL GROUP | Facility: CLINIC | Age: 68
End: 2023-06-27
Payer: MEDICARE

## 2023-06-27 NOTE — TELEPHONE ENCOUNTER
VOICEMAIL  1. Caller Name: Rory Mason McMahon                        Call Back Number: [phone] (home)         2. Message: pt lvm wondering what was going on with his order for his prosthetic sleeves with  Clinic.     3. Patient approves office to leave a detailed voicemail/MyChart message: N\A      Phone Number Called: [phone] (home)       Call outcome: Spoke to patient regarding message below.    Message: I spoke with pt and let him know that we were able to have  Clinic fax over the last bit we needed to sign and that I will fax it out [facility] 6/27/23

## 2023-11-08 ENCOUNTER — TELEPHONE (OUTPATIENT)
Dept: HEALTH INFORMATION MANAGEMENT | Facility: OTHER | Age: 68
End: 2023-11-08

## 2024-02-07 ENCOUNTER — OFFICE VISIT (OUTPATIENT)
Dept: URGENT CARE | Facility: CLINIC | Age: 69
End: 2024-02-07
Payer: MEDICARE

## 2024-02-07 VITALS
DIASTOLIC BLOOD PRESSURE: 80 MMHG | HEIGHT: 73 IN | WEIGHT: 227 LBS | TEMPERATURE: 97.1 F | OXYGEN SATURATION: 97 % | HEART RATE: 72 BPM | RESPIRATION RATE: 14 BRPM | BODY MASS INDEX: 30.09 KG/M2 | SYSTOLIC BLOOD PRESSURE: 124 MMHG

## 2024-02-07 DIAGNOSIS — B02.9 HERPES ZOSTER WITHOUT COMPLICATION: ICD-10-CM

## 2024-02-07 PROCEDURE — 3074F SYST BP LT 130 MM HG: CPT

## 2024-02-07 PROCEDURE — 3079F DIAST BP 80-89 MM HG: CPT

## 2024-02-07 PROCEDURE — 99213 OFFICE O/P EST LOW 20 MIN: CPT

## 2024-02-07 RX ORDER — VALACYCLOVIR HYDROCHLORIDE 1 G/1
1000 TABLET, FILM COATED ORAL 3 TIMES DAILY
Qty: 21 TABLET | Refills: 0 | Status: SHIPPED | OUTPATIENT
Start: 2024-02-07 | End: 2024-02-14
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
## 2024-02-19 ENCOUNTER — DOCUMENTATION (OUTPATIENT)
Dept: HEALTH INFORMATION MANAGEMENT | Facility: OTHER | Age: 69
End: 2024-02-19
Payer: MEDICARE

## 2024-03-25 SDOH — ECONOMIC STABILITY: TRANSPORTATION INSECURITY
IN THE PAST 12 MONTHS, HAS THE LACK OF TRANSPORTATION KEPT YOU FROM MEDICAL APPOINTMENTS OR FROM GETTING MEDICATIONS?: NO

## 2024-03-25 SDOH — ECONOMIC STABILITY: HOUSING INSECURITY
IN THE LAST 12 MONTHS, WAS THERE A TIME WHEN YOU DID NOT HAVE A STEADY PLACE TO SLEEP OR SLEPT IN A SHELTER (INCLUDING NOW)?: NO

## 2024-03-25 SDOH — ECONOMIC STABILITY: TRANSPORTATION INSECURITY
IN THE PAST 12 MONTHS, HAS LACK OF RELIABLE TRANSPORTATION KEPT YOU FROM MEDICAL APPOINTMENTS, MEETINGS, WORK OR FROM GETTING THINGS NEEDED FOR DAILY LIVING?: NO

## 2024-03-25 SDOH — ECONOMIC STABILITY: FOOD INSECURITY: WITHIN THE PAST 12 MONTHS, YOU WORRIED THAT YOUR FOOD WOULD RUN OUT BEFORE YOU GOT MONEY TO BUY MORE.: OFTEN TRUE

## 2024-03-25 SDOH — ECONOMIC STABILITY: INCOME INSECURITY: HOW HARD IS IT FOR YOU TO PAY FOR THE VERY BASICS LIKE FOOD, HOUSING, MEDICAL CARE, AND HEATING?: VERY HARD

## 2024-03-25 SDOH — ECONOMIC STABILITY: FOOD INSECURITY: WITHIN THE PAST 12 MONTHS, THE FOOD YOU BOUGHT JUST DIDN'T LAST AND YOU DIDN'T HAVE MONEY TO GET MORE.: OFTEN TRUE

## 2024-03-25 SDOH — HEALTH STABILITY: PHYSICAL HEALTH: ON AVERAGE, HOW MANY DAYS PER WEEK DO YOU ENGAGE IN MODERATE TO STRENUOUS EXERCISE (LIKE A BRISK WALK)?: 0 DAYS

## 2024-03-25 SDOH — HEALTH STABILITY: MENTAL HEALTH
STRESS IS WHEN SOMEONE FEELS TENSE, NERVOUS, ANXIOUS, OR CAN'T SLEEP AT NIGHT BECAUSE THEIR MIND IS TROUBLED. HOW STRESSED ARE YOU?: RATHER MUCH

## 2024-03-25 SDOH — ECONOMIC STABILITY: INCOME INSECURITY: IN THE LAST 12 MONTHS, WAS THERE A TIME WHEN YOU WERE NOT ABLE TO PAY THE MORTGAGE OR RENT ON TIME?: NO

## 2024-03-25 SDOH — HEALTH STABILITY: PHYSICAL HEALTH: ON AVERAGE, HOW MANY MINUTES DO YOU ENGAGE IN EXERCISE AT THIS LEVEL?: 0 MIN

## 2024-03-25 SDOH — ECONOMIC STABILITY: HOUSING INSECURITY: IN THE LAST 12 MONTHS, HOW MANY PLACES HAVE YOU LIVED?: 1

## 2024-03-25 SDOH — ECONOMIC STABILITY: TRANSPORTATION INSECURITY
IN THE PAST 12 MONTHS, HAS LACK OF TRANSPORTATION KEPT YOU FROM MEETINGS, WORK, OR FROM GETTING THINGS NEEDED FOR DAILY LIVING?: NO

## 2024-03-25 ASSESSMENT — SOCIAL DETERMINANTS OF HEALTH (SDOH)
IN A TYPICAL WEEK, HOW MANY TIMES DO YOU TALK ON THE PHONE WITH FAMILY, FRIENDS, OR NEIGHBORS?: ONCE A WEEK
DO YOU BELONG TO ANY CLUBS OR ORGANIZATIONS SUCH AS CHURCH GROUPS UNIONS, FRATERNAL OR ATHLETIC GROUPS, OR SCHOOL GROUPS?: NO
HOW OFTEN DO YOU ATTENT MEETINGS OF THE CLUB OR ORGANIZATION YOU BELONG TO?: NEVER
HOW OFTEN DO YOU GET TOGETHER WITH FRIENDS OR RELATIVES?: NEVER
HOW MANY DRINKS CONTAINING ALCOHOL DO YOU HAVE ON A TYPICAL DAY WHEN YOU ARE DRINKING: PATIENT DOES NOT DRINK
HOW OFTEN DO YOU GET TOGETHER WITH FRIENDS OR RELATIVES?: NEVER
HOW OFTEN DO YOU ATTEND CHURCH OR RELIGIOUS SERVICES?: NEVER
HOW OFTEN DO YOU ATTEND CHURCH OR RELIGIOUS SERVICES?: NEVER
HOW OFTEN DO YOU ATTENT MEETINGS OF THE CLUB OR ORGANIZATION YOU BELONG TO?: NEVER
HOW OFTEN DO YOU HAVE A DRINK CONTAINING ALCOHOL: NEVER
IN A TYPICAL WEEK, HOW MANY TIMES DO YOU TALK ON THE PHONE WITH FAMILY, FRIENDS, OR NEIGHBORS?: ONCE A WEEK
WITHIN THE PAST 12 MONTHS, YOU WORRIED THAT YOUR FOOD WOULD RUN OUT BEFORE YOU GOT THE MONEY TO BUY MORE: OFTEN TRUE
HOW OFTEN DO YOU HAVE SIX OR MORE DRINKS ON ONE OCCASION: NEVER
ARE YOU MARRIED, WIDOWED, DIVORCED, SEPARATED, NEVER MARRIED, OR LIVING WITH A PARTNER?: NEVER MARRIED
ARE YOU MARRIED, WIDOWED, DIVORCED, SEPARATED, NEVER MARRIED, OR LIVING WITH A PARTNER?: NEVER MARRIED
DO YOU BELONG TO ANY CLUBS OR ORGANIZATIONS SUCH AS CHURCH GROUPS UNIONS, FRATERNAL OR ATHLETIC GROUPS, OR SCHOOL GROUPS?: NO
HOW HARD IS IT FOR YOU TO PAY FOR THE VERY BASICS LIKE FOOD, HOUSING, MEDICAL CARE, AND HEATING?: VERY HARD

## 2024-03-25 ASSESSMENT — LIFESTYLE VARIABLES
HOW MANY STANDARD DRINKS CONTAINING ALCOHOL DO YOU HAVE ON A TYPICAL DAY: PATIENT DOES NOT DRINK
SKIP TO QUESTIONS 9-10: 1
AUDIT-C TOTAL SCORE: 0
HOW OFTEN DO YOU HAVE A DRINK CONTAINING ALCOHOL: NEVER
HOW OFTEN DO YOU HAVE SIX OR MORE DRINKS ON ONE OCCASION: NEVER

## 2024-03-27 ENCOUNTER — OFFICE VISIT (OUTPATIENT)
Dept: MEDICAL GROUP | Facility: CLINIC | Age: 69
End: 2024-03-27
Payer: MEDICARE

## 2024-03-27 VITALS
SYSTOLIC BLOOD PRESSURE: 118 MMHG | TEMPERATURE: 98 F | BODY MASS INDEX: 30.39 KG/M2 | HEART RATE: 66 BPM | DIASTOLIC BLOOD PRESSURE: 60 MMHG | HEIGHT: 73 IN | RESPIRATION RATE: 20 BRPM | WEIGHT: 229.28 LBS | OXYGEN SATURATION: 98 %

## 2024-03-27 DIAGNOSIS — E78.5 DYSLIPIDEMIA: ICD-10-CM

## 2024-03-27 DIAGNOSIS — I21.4 NSTEMI (NON-ST ELEVATED MYOCARDIAL INFARCTION) (HCC): ICD-10-CM

## 2024-03-27 DIAGNOSIS — K21.9 CHRONIC GASTROESOPHAGEAL REFLUX DISEASE: ICD-10-CM

## 2024-03-27 DIAGNOSIS — S88.112S: ICD-10-CM

## 2024-03-27 DIAGNOSIS — I25.10 CORONARY ARTERY DISEASE INVOLVING NATIVE CORONARY ARTERY OF NATIVE HEART WITHOUT ANGINA PECTORIS: ICD-10-CM

## 2024-03-27 DIAGNOSIS — Z12.5 PROSTATE CANCER SCREENING: ICD-10-CM

## 2024-03-27 DIAGNOSIS — Z95.5 S/P CORONARY ARTERY STENT PLACEMENT: ICD-10-CM

## 2024-03-27 DIAGNOSIS — E55.9 VITAMIN D DEFICIENCY: ICD-10-CM

## 2024-03-27 PROBLEM — G89.18 POSTOPERATIVE PAIN: Status: RESOLVED | Noted: 2018-04-17 | Resolved: 2024-03-27
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
## 2024-03-27 PROBLEM — Z00.00 ROUTINE PHYSICAL EXAMINATION: Status: RESOLVED | Noted: 2022-08-04 | Resolved: 2024-03-27

## 2024-03-27 PROCEDURE — 99214 OFFICE O/P EST MOD 30 MIN: CPT | Performed by: PHYSICIAN ASSISTANT

## 2024-03-27 PROCEDURE — 3078F DIAST BP <80 MM HG: CPT | Performed by: PHYSICIAN ASSISTANT

## 2024-03-27 PROCEDURE — 3074F SYST BP LT 130 MM HG: CPT | Performed by: PHYSICIAN ASSISTANT

## 2024-03-27 RX ORDER — ATORVASTATIN CALCIUM 40 MG/1
40 TABLET, FILM COATED ORAL EVERY EVENING
Qty: 90 TABLET | Refills: 1 | Status: SHIPPED | OUTPATIENT
Start: 2024-03-27

## 2024-03-27 RX ORDER — OMEPRAZOLE 40 MG/1
40 CAPSULE, DELAYED RELEASE ORAL DAILY
Qty: 90 CAPSULE | Refills: 1 | Status: SHIPPED | OUTPATIENT
Start: 2024-03-27

## 2024-03-27 ASSESSMENT — PATIENT HEALTH QUESTIONNAIRE - PHQ9: CLINICAL INTERPRETATION OF PHQ2 SCORE: 0

## 2024-03-27 NOTE — PROGRESS NOTES
cc: Hyperlipidemia    Subjective:     Rory Kline is a 68 y.o. male presenting for hyperlipidemia    Patient presents to the office for hyperlipidemia.  Patient presents the office today as a new patient to me.  Previously he was seen by Ruthann Hamilton PA-C who is no longer with the practice.  He does have hyperlipidemia with coronary artery disease and is status post coronary artery stent placement.  He also has a vitamin D deficiency.  He is due for routine lab work and is on atorvastatin.   He states that he is taking medication about once every four days.      Patient is due for routine lab work.  Father had a history of prostate cancer.  Patient also has vitamin D deficiency.    Patient states that he has been having bloating and stomach issues.  He states that he has not been able to afford labs or a specialist.  His father had GERD and was given omeprazole which contributed to kidney problems.  Patient is hesitant about taking omeprazole as he indicates this caused kidney failure for his father.  He indicates that cost is an issue to see a specialist regarding his bloating and he does feel that his symptoms are worsening.      Review of systems:  See above.   Denies any symptoms unless previously indicated.        Current Outpatient Medications:     omeprazole (PRILOSEC) 40 MG delayed-release capsule, Take 1 Capsule by mouth every day., Disp: 90 Capsule, Rfl: 1    atorvastatin (LIPITOR) 40 MG Tab, Take 1 Tablet by mouth every evening., Disp: 90 Tablet, Rfl: 1    NON SPECIFIED, Patient needs new outer sleeves for his prosthesis, Disp: 2 Each, Rfl: 0    aspirin EC 81 MG EC tablet, Take 1 Tab by mouth every day., Disp: 30 Tab, Rfl:     Allergies, past medical history, past surgical history, family history, social history reviewed and updated    Objective:     Vitals: /60 (BP Location: Left arm, Patient Position: Sitting, BP Cuff Size: Adult)   Pulse 66   Temp 36.7 °C (98 °F) (Temporal)   Resp  "20   Ht 1.842 m (6' 0.5\")   Wt 104 kg (229 lb 4.5 oz)   SpO2 98%   BMI 30.67 kg/m²   General: Alert, pleasant, NAD  EYES:   PERRL, EOMI, no icterus or pallor.  Conjunctivae and lids normal.   HENT:  Normocephalic.  External ears normal.   Neck supple.     Respiratory: Normal respiratory effort.    Abdomen: Abdomen appears bloated..  Skin: Warm, dry, no rashes.  Musculoskeletal: Gait is normal.  Moves all extremities well.    Extremities: Below the knee amputation left thigh.  Prosthetic in place..   Neurological: No tremors, sensation grossly intact,   CN2-12 intact.  Psych:  Affect/mood is normal, judgement is good, memory is intact, grooming is appropriate.    Assessment/Plan:     Rory was seen today for establish care.    Diagnoses and all orders for this visit:    Dyslipidemia  -     Comp Metabolic Panel; Future  -     Lipid Profile; Future  -     atorvastatin (LIPITOR) 40 MG Tab; Take 1 Tablet by mouth every evening.  Coronary artery disease involving native coronary artery of native heart without angina pectoris  -     Comp Metabolic Panel; Future  -     Lipid Profile; Future  NSTEMI (non-ST elevated myocardial infarction) (HCC)  -     atorvastatin (LIPITOR) 40 MG Tab; Take 1 Tablet by mouth every evening.  S/P coronary artery stent placement  -     Comp Metabolic Panel; Future  -     Lipid Profile; Future    Patient is due for routine lab work as well as cholesterol medication refill.  Concerned that he is taking his cholesterol medication every 4 days.  However we will obtain labs to evaluate further.  Follow-up in 8 to 12 weeks.  Cost is an issue and so patient advised of cash clinical.    Vitamin D deficiency  -     VITAMIN D,25 HYDROXY (DEFICIENCY); Future  Prostate cancer screening  -     PROSTATE SPECIFIC AG SCREENING; Future    Patient does have low vitamin D with last set of labs and is due for prostate cancer screening.  Labs ordered to evaluate further.    Chronic gastroesophageal reflux " disease  -     omeprazole (PRILOSEC) 40 MG delayed-release capsule; Take 1 Capsule by mouth every day.    Concerned with patient's bloating.  Worsening.  I do feel that patient is due for further testing which she declines due to cost.  Also recommend referral to GI which she declines due to cost.  Recommend we at least try a PPI to try and help with symptoms but if symptoms do not improve we will need to stop medication.  Patient is willing to try omeprazole.    Complete below-knee amputation of left lower extremity, sequela (HCC)    Stable.  No treatment needed at this time.          Return for 8-12 weeks. follow up labs..    Please note that this dictation was created using voice recognition software. I have made every reasonable attempt to correct obvious errors, but expect that there are errors of grammar and possible content that I did not discover before finalizing note.

## 2024-10-08 ENCOUNTER — OFFICE VISIT (OUTPATIENT)
Dept: URGENT CARE | Facility: CLINIC | Age: 69
End: 2024-10-08
Payer: MEDICARE

## 2024-10-08 VITALS
HEART RATE: 70 BPM | TEMPERATURE: 98.8 F | RESPIRATION RATE: 16 BRPM | HEIGHT: 73 IN | OXYGEN SATURATION: 98 % | DIASTOLIC BLOOD PRESSURE: 62 MMHG | BODY MASS INDEX: 30.35 KG/M2 | WEIGHT: 229 LBS | SYSTOLIC BLOOD PRESSURE: 114 MMHG

## 2024-10-08 DIAGNOSIS — H93.8X1 SENSATION OF PLUGGED EAR ON RIGHT SIDE: ICD-10-CM

## 2024-10-08 DIAGNOSIS — H61.21 IMPACTED CERUMEN OF RIGHT EAR: ICD-10-CM

## 2024-10-08 PROCEDURE — 3074F SYST BP LT 130 MM HG: CPT | Performed by: PHYSICIAN ASSISTANT

## 2024-10-08 PROCEDURE — 99213 OFFICE O/P EST LOW 20 MIN: CPT | Performed by: PHYSICIAN ASSISTANT

## 2024-10-08 PROCEDURE — 3078F DIAST BP <80 MM HG: CPT | Performed by: PHYSICIAN ASSISTANT

## 2024-10-08 ASSESSMENT — ENCOUNTER SYMPTOMS
VOMITING: 0
EYE DISCHARGE: 0
COUGH: 0
NAUSEA: 0
RHINORRHEA: 1
SORE THROAT: 0
FEVER: 0
EYE REDNESS: 0
HEADACHES: 0

## (undated) DEVICE — GLOVE BIOGEL PI INDICATOR SZ 7.0 SURGICAL PF LF - (50/BX 4BX/CA)

## (undated) DEVICE — SUTURE 2-0 ETHILON FS - (36/BX) 18 INCH

## (undated) DEVICE — VAC CANISTER W/GEL 500ML - FITS NEW MACHINES (10EA/CA)

## (undated) DEVICE — PADDING CAST 6 IN STERILE - 6 X 4 YDS (24/CA)

## (undated) DEVICE — SODIUM CHL IRRIGATION 0.9% 1000ML (12EA/CA)

## (undated) DEVICE — CHLORAPREP 26 ML APPLICATOR - ORANGE TINT(25/CA)

## (undated) DEVICE — DRESSING, WOUND VAC MED.

## (undated) DEVICE — SENSOR SPO2 NEO LNCS ADHESIVE (20/BX) SEE USER NOTES

## (undated) DEVICE — BANDAGE ROLL STERILE BULKEE 4.5 IN X 4 YD (100EA/CA)

## (undated) DEVICE — KIT ROOM DECONTAMINATION

## (undated) DEVICE — GOWN SURGEONS X-LARGE - DISP. (30/CA)

## (undated) DEVICE — SUTURE 1 VICRYL PLUS CTX - 8 X 18 INCH (12/BX)

## (undated) DEVICE — GLOVE BIOGEL ECLIPSE PF LATEX SIZE 8.0  (50PR/BX)

## (undated) DEVICE — DRAPE C-ARM LARGE 41IN X 74 IN - (10/BX 2BX/CA)

## (undated) DEVICE — SODIUM CHL. IRRIGATION 0.9% 3000ML (4EA/CA 65CA/PF)

## (undated) DEVICE — PADDING CAST 4 IN STERILE - 4 X 4 YDS (24/CA)

## (undated) DEVICE — PACK MAJOR ORTHO - (2EA/CA)

## (undated) DEVICE — TOURNIQUET CUFF 34 X 4 ONE PORT DISP - STERILE (10/BX)

## (undated) DEVICE — DRESSING KIT V.A.C. SENSA T.R.A.C. SMALL (10EA/CA)

## (undated) DEVICE — GLOVE BIOGEL INDICATOR SZ 7.5 SURGICAL PF LTX - (50PR/BX 4BX/CA)

## (undated) DEVICE — MASK, LARYNGEAL AIRWAY #5

## (undated) DEVICE — STOCKINET BIAS 6 IN STERILE - (20/CA)

## (undated) DEVICE — BLOCK

## (undated) DEVICE — DRESSING PETROLEUM GAUZE 5 X 9" (50EA/BX 4BX/CA)"

## (undated) DEVICE — SPLINT PLASTER 5 IN X 30 IN - (50EA/BX 6BX/CA)

## (undated) DEVICE — TUBE E-T HI-LO CUFF 7.0MM (10EA/PK)

## (undated) DEVICE — SLEEVE, VASO, THIGH, MED

## (undated) DEVICE — NEPTUNE 4 PORT MANIFOLD - (20/PK)

## (undated) DEVICE — IMMOBILIZER KNEE 20 INCH - (1/EA)

## (undated) DEVICE — TRAY SKIN SCRUB PVP WET (20EA/CA) PART #DYND70356 DISCONTINUED

## (undated) DEVICE — CANISTER SUCTION 3000ML MECHANICAL FILTER AUTO SHUTOFF MEDI-VAC NONSTERILE LF DISP  (40EA/CA)

## (undated) DEVICE — BANDAGE ELASTIC 6 HONEYCOMB - 6X5YD LF (20/CA)"

## (undated) DEVICE — SUTURE ETHILON 2-0 FSLX 30 (36PK/BX)"

## (undated) DEVICE — SET EXTENSION WITH 2 PORTS (48EA/CA) ***PART #2C8610 IS A SUBSTITUTE*****

## (undated) DEVICE — GLOVE BIOGEL PI ORTHO SZ 7 PF LF (40PR/BX)

## (undated) DEVICE — GLOVE BIOGEL INDICATOR SZ 8.5 SURGICAL PF LTX - (50/BX 4BX/CA)

## (undated) DEVICE — GLOVE BIOGEL PI INDICATOR SZ 6.5 SURGICAL PF LF - (50/BX 4BX/CA)

## (undated) DEVICE — PAD LAP STERILE 18 X 18 - (5/PK 40PK/CA)

## (undated) DEVICE — GOWN WARMING STANDARD FLEX - (30/CA)

## (undated) DEVICE — PACK LOWER EXTREMITY - (2/CA)

## (undated) DEVICE — GLOVE BIOGEL INDICATOR SZ 8 SURGICAL PF LTX - (50/BX 4BX/CA)

## (undated) DEVICE — GLOVE BIOGEL SZ 6.5 SURGICAL PF LTX (50PR/BX 4BX/CA)

## (undated) DEVICE — DRESSING 3X8 ADAPTIC GAUZE - NON-ADHERING (36/PK 6PK/BX)

## (undated) DEVICE — DRAPE LARGE 3 QUARTER - (20/CA)

## (undated) DEVICE — WATER IRRIG. STER. 1500 ML - (9/CA)

## (undated) DEVICE — SET LEADWIRE 5 LEAD BEDSIDE DISPOSABLE ECG (1SET OF 5/EA)

## (undated) DEVICE — TUBING CLEARLINK DUO-VENT - C-FLO (48EA/CA)

## (undated) DEVICE — GLOVE BIOGEL SZ 7.5 SURGICAL PF LTX - (50PR/BX 4BX/CA)

## (undated) DEVICE — PROTECTOR ULNA NERVE - (36PR/CA)

## (undated) DEVICE — ELECTRODE 850 FOAM ADHESIVE - HYDROGEL RADIOTRNSPRNT (50/PK)

## (undated) DEVICE — GLOVE SZ 6.5 BIOGEL PI MICRO - PF LF (50PR/BX)

## (undated) DEVICE — BLADE SAW 90X25X1.37MM SAGITTAL DUAL CUT

## (undated) DEVICE — DRESSING XEROFORM 1X8 - (50/BX 4BX/CA)

## (undated) DEVICE — KIT EVACUATER 3 SPRING PVC LF 1/8 DRAIN SIZE (10EA/CA)"

## (undated) DEVICE — SET IRRIGATION CYSTOSCOPY TUBE L80 IN (20EA/CA)

## (undated) DEVICE — DRAPE SURGICAL U 77X120 - (10/CA)

## (undated) DEVICE — PADDING CAST 4 IN X 4 YDS - SOF-ROLL (12RL/BG 6BG/CT)

## (undated) DEVICE — SUTURE 3-0 ETHILON FSLX 30 (36PK/BX)"

## (undated) DEVICE — SPONGE GAUZE STER 4X4 8-PL - (2/PK 50PK/BX 12BX/CS)

## (undated) DEVICE — KIT ANESTHESIA W/CIRCUIT & 3/LT BAG W/FILTER (20EA/CA)

## (undated) DEVICE — SUTURE 0 VICRYL PLUS CT-1 - 8 X 18 INCH (12/BX)

## (undated) DEVICE — GLOVE BIOGEL INDICATOR SZ 6.5 SURGICAL PF LTX - (50PR/BX 4BX/CA)

## (undated) DEVICE — STAPLER SKIN DISP - (6/BX 10BX/CA) VISISTAT

## (undated) DEVICE — MASK, LARYNGEAL AIRWAY #4

## (undated) DEVICE — GLOVE BIOGEL PI ORTHO SZ 7.5 PF LF (40PR/BX)

## (undated) DEVICE — SUTURE 2-0 VICRYL PLUS CT-1 - 8 X 18 INCH(12/BX)

## (undated) DEVICE — MASK ANESTHESIA ADULT  - (100/CA)

## (undated) DEVICE — ELECTRODE DUAL RETURN W/ CORD - (50/PK)

## (undated) DEVICE — HEAD HOLDER JUNIOR/ADULT

## (undated) DEVICE — DRAPE LOWER EXTREMETY - (6/CA)

## (undated) DEVICE — SUTURE GENERAL

## (undated) DEVICE — SUCTION INSTRUMENT YANKAUER BULBOUS TIP W/O VENT (50EA/CA)

## (undated) DEVICE — LACTATED RINGERS INJ 1000 ML - (14EA/CA 60CA/PF)

## (undated) DEVICE — DRAPE IOBAN II INCISE 23X17 - (10EA/BX 4BX/CA)